# Patient Record
Sex: FEMALE | Race: AMERICAN INDIAN OR ALASKA NATIVE | Employment: FULL TIME | URBAN - METROPOLITAN AREA
[De-identification: names, ages, dates, MRNs, and addresses within clinical notes are randomized per-mention and may not be internally consistent; named-entity substitution may affect disease eponyms.]

---

## 2018-04-17 ENCOUNTER — OFFICE VISIT (OUTPATIENT)
Dept: FAMILY MEDICINE | Facility: CLINIC | Age: 38
End: 2018-04-17
Payer: COMMERCIAL

## 2018-04-17 VITALS
HEART RATE: 73 BPM | RESPIRATION RATE: 16 BRPM | OXYGEN SATURATION: 95 % | TEMPERATURE: 98.3 F | DIASTOLIC BLOOD PRESSURE: 83 MMHG | SYSTOLIC BLOOD PRESSURE: 120 MMHG | WEIGHT: 238.4 LBS | BODY MASS INDEX: 33.25 KG/M2

## 2018-04-17 DIAGNOSIS — E66.811 CLASS 1 OBESITY WITHOUT SERIOUS COMORBIDITY WITH BODY MASS INDEX (BMI) OF 33.0 TO 33.9 IN ADULT, UNSPECIFIED OBESITY TYPE: ICD-10-CM

## 2018-04-17 DIAGNOSIS — N92.0 MENORRHAGIA WITH REGULAR CYCLE: Primary | ICD-10-CM

## 2018-04-17 DIAGNOSIS — Z00.00 ENCOUNTER FOR ROUTINE ADULT HEALTH EXAMINATION WITHOUT ABNORMAL FINDINGS: ICD-10-CM

## 2018-04-17 DIAGNOSIS — Z72.0 TOBACCO ABUSE: ICD-10-CM

## 2018-04-17 LAB
CHOLEST SERPL-MCNC: 158.1 MG/DL (ref 0–200)
CHOLEST/HDLC SERPL: 2.6 {RATIO} (ref 0–5)
HBA1C MFR BLD: 5.5 % (ref 4.1–5.7)
HCT VFR BLD AUTO: 41.5 % (ref 35–47)
HDLC SERPL-MCNC: 61.2 MG/DL
HEMOGLOBIN: 13 G/DL (ref 11.7–15.7)
LDLC SERPL CALC-MCNC: 84 MG/DL (ref 0–129)
MCH RBC QN AUTO: 25.6 PG (ref 26.5–35)
MCHC RBC AUTO-ENTMCNC: 31.3 G/DL (ref 32–36)
MCV RBC AUTO: 81.9 FL (ref 78–100)
PLATELET # BLD AUTO: 492 K/UL (ref 150–450)
RBC # BLD AUTO: 5.07 M/UL (ref 3.8–5.2)
TRIGL SERPL-MCNC: 63.2 MG/DL (ref 0–150)
TSH SERPL DL<=0.005 MIU/L-ACNC: 1.46 MU/L (ref 0.4–4)
VLDL CHOLESTEROL: 12.6 MG/DL (ref 7–32)
WBC # BLD AUTO: 7.2 K/UL (ref 4–11)

## 2018-04-17 NOTE — PATIENT INSTRUCTIONS
Here is the plan from today's visit    1. Dysfunctional uterine bleeding  - Return in 2 weeks to discuss IUD further    2. Menorrhagia with regular cycle  I or someone else from clinic will call you if results are abrnormal, otherwise we will discuss things at your follow-up in 2 weeks  - CBC with Plt ([facility])  - TSH with free T4 reflex    3. Encounter for routine adult health examination without abnormal findings  - Hemoglobin A1c ([facility])  - Lipid [place] ([facility])    Please call or return to clinic if your symptoms don't go away.    Follow up plan  Please make a clinic appointment for follow up with me (Talita Sultana) in 2 weeks for recheck.    Thank you for coming to [facility] today.  Lab Testing:  **If you had lab testing today and your results are reassuring or normal they will be mailed to you or sent through [employer] within 7 days.   **If the lab tests need quick action we will call you with the results.  The phone number we will call with results is # [phone] (home) . If this is not the best number please call our clinic and change the number.  Medication Refills:  If you need any refills please call your pharmacy and they will contact us.   If you need to  your refill at a new pharmacy, please contact the new pharmacy directly. The new pharmacy will help you get your medications transferred faster.   Scheduling:  If you have any concerns about today's visit or wish to schedule another appointment please call our office during normal business hours [phone] (8-5:00 M-F)  If a referral was made to a [facility] and you don't get a call from central scheduling please call [phone].  If a Mammogram was ordered for you at [facility] call [phone] to schedule or change your appointment.  If you had an XRay/CT/Ultrasound/MRI ordered the number is [phone] to schedule or change your radiology appointment.   Medical Concerns:  If you have urgent  medical concerns please call [phone] at any time of the day.      Birth Control: IUD (Intrauterine Device)    The IUD (intrauterine device) is small, flexible, and T-shaped. A trained healthcare provider places it in the uterus. The IUD is one of the most effective birth control methods. It is also reversible. This means it can be removed at any time by a trained healthcare provider. New IUDs are safe and do not have the risks of older types of IUDs.  Pregnancy rates  Talk to your healthcare provider about the effectiveness of this birth control method.  Types of IUDs  IUD insertion is done in the healthcare provider s office. Two types of IUDs are available:    The copper IUD releases a small amount of copper into the uterus. The copper makes it harder for sperm to reach the egg. The device works for at least 10 years.    The progestin IUD releases a hormone called progestin. It causes changes in the uterus to help prevent pregnancy. The device works for 3 to 5 years, depending on which device is chosen. It may be recommended for women who have anemia or heavy and painful periods.  IUDs have thin strings that hang from the opening of the uterus into the vagina. This lets you check that the IUD stays in place.  Things to know about IUDs    IUDs can be used by women who have never been pregnant or by women with a history of sexually transmitted infections (STIs) or tubal pregnancy.    It won't move from the uterus to any other part of the body.    There is a slight risk of the device coming out of the vagina (expulsion).    It may not work in women who have an abnormally shaped uterus.    A copper IUD may cause heavier periods and cramping.    Progestin IUD may cause light periods or no periods at all (irregular bleeding or spotting is possible and normal during first 3 to 6 months).    If you get a sexually transmitted infection with an IUD in place, symptoms may be more severe.  What to report to your  healthcare provider  Be sure your healthcare provider knows if you have:    A sexually transmitted infection (STI) or possible STI    Liver problems    Blood clots (for progestin IUD only)    Breast cancer or a history of breast cancer (progestin IUD only)   Date Last Reviewed: 3/1/2017    8337-5277 [employer]. [address] [place], [place] [address]. All rights reserved. This information is not intended as a substitute for professional medical care. Always follow your healthcare professional's instructions.

## 2018-04-17 NOTE — MR AVS SNAPSHOT
After Visit Summary   4/17/2018    Lesly Herrera    MRN: [id number]           Patient Information     Date Of Birth          1980        Visit Information        Provider Department      4/17/2018 8:20 AM Samara Espinal MD [facility]        Today's Diagnoses     Dysfunctional uterine bleeding    -  1    Menorrhagia with regular cycle        Encounter for routine adult health examination without abnormal findings          Care Instructions    Here is the plan from today's visit    1. Dysfunctional uterine bleeding  - Return in 2 weeks to discuss IUD further    2. Menorrhagia with regular cycle  I or someone else from clinic will call you if results are abrnormal, otherwise we will discuss things at your follow-up in 2 weeks  - CBC with Plt ([facility])  - TSH with free T4 reflex    3. Encounter for routine adult health examination without abnormal findings  - Hemoglobin A1c ([facility])  - Lipid [place] ([facility])    Please call or return to clinic if your symptoms don't go away.    Follow up plan  Please make a clinic appointment for follow up with me (Talita Sultana) in 2 weeks for recheck.    Thank you for coming to [facility] today.  Lab Testing:  **If you had lab testing today and your results are reassuring or normal they will be mailed to you or sent through [facility] within 7 days.   **If the lab tests need quick action we will call you with the results.  The phone number we will call with results is # [phone] (home) . If this is not the best number please call our clinic and change the number.  Medication Refills:  If you need any refills please call your pharmacy and they will contact us.   If you need to  your refill at a new pharmacy, please contact the new pharmacy directly. The new pharmacy will help you get your medications transferred faster.   Scheduling:  If you have any concerns about today's visit or wish to schedule another  appointment please call our office during normal business hours [phone] (8-5:00 M-F)  If a referral was made to a [facility] and you don't get a call from central scheduling please call [phone].  If a Mammogram was ordered for you at [facility] call [phone] to schedule or change your appointment.  If you had an XRay/CT/Ultrasound/MRI ordered the number is [phone] to schedule or change your radiology appointment.   Medical Concerns:  If you have urgent medical concerns please call [phone] at any time of the day.      Birth Control: IUD (Intrauterine Device)    The IUD (intrauterine device) is small, flexible, and T-shaped. A trained healthcare provider places it in the uterus. The IUD is one of the most effective birth control methods. It is also reversible. This means it can be removed at any time by a trained healthcare provider. New IUDs are safe and do not have the risks of older types of IUDs.  Pregnancy rates  Talk to your healthcare provider about the effectiveness of this birth control method.  Types of IUDs  IUD insertion is done in the healthcare provider s office. Two types of IUDs are available:    The copper IUD releases a small amount of copper into the uterus. The copper makes it harder for sperm to reach the egg. The device works for at least 10 years.    The progestin IUD releases a hormone called progestin. It causes changes in the uterus to help prevent pregnancy. The device works for 3 to 5 years, depending on which device is chosen. It may be recommended for women who have anemia or heavy and painful periods.  IUDs have thin strings that hang from the opening of the uterus into the vagina. This lets you check that the IUD stays in place.  Things to know about IUDs    IUDs can be used by women who have never been pregnant or by women with a history of sexually transmitted infections (STIs) or tubal pregnancy.    It won't move from the uterus  to any other part of the body.    There is a slight risk of the device coming out of the vagina (expulsion).    It may not work in women who have an abnormally shaped uterus.    A copper IUD may cause heavier periods and cramping.    Progestin IUD may cause light periods or no periods at all (irregular bleeding or spotting is possible and normal during first 3 to 6 months).    If you get a sexually transmitted infection with an IUD in place, symptoms may be more severe.  What to report to your healthcare provider  Be sure your healthcare provider knows if you have:    A sexually transmitted infection (STI) or possible STI    Liver problems    Blood clots (for progestin IUD only)    Breast cancer or a history of breast cancer (progestin IUD only)   Date Last Reviewed: 3/1/2017    5536-6854 [employer]. [address] [place] [address]. All rights reserved. This information is not intended as a substitute for professional medical care. Always follow your healthcare professional's instructions.                  Follow-ups after your visit        Who to contact     Please call your clinic at [phone] to:    Ask questions about your health    Make or cancel appointments    Discuss your medicines    Learn about your test results    Speak to your doctor            Additional Information About Your Visit        [employer] Information     [employer] is an electronic gateway that provides easy, online access to your medical records. With [employer], you can request a clinic appointment, read your test results, renew a prescription or communicate with your care team.     To sign up for [email or web address] visit the website at [email or web address]   You will be asked to enter the access code listed below, as well as some personal information. Please follow the directions to create your username and password.     Your access code is: [email or web address]  Expires: 2018  9:18 AM     Your access code will  in 90  days. If you need help or a new code, please contact your [facility] or call [phone] for assistance.        Care EveryWhere ID     This is your Care EveryWhere ID. This could be used by other organizations to access your [facility] medical records  [id number]        Your Vitals Were     Pulse Temperature Respirations Last Period Pulse Oximetry BMI (Body Mass Index)    73 98.3  F (36.8  C) (Oral) 16 04/02/2018 (Approximate) 95% 33.25 kg/m2       Blood Pressure from Last 3 Encounters:   04/17/18 120/83   04/01/16 118/84   03/28/16 122/80    Weight from Last 3 Encounters:   04/17/18 238 lb 6.4 oz (108.1 kg)   04/01/16 222 lb (100.7 kg)   03/28/16 218 lb 3.2 oz (99 kg)              We Performed the Following     CBC with Plt ([facility])     Hemoglobin A1c ([facility])     Lipid [place] ([facility])     TSH with free T4 reflex        Primary Care Provider Office Phone # Fax #    Susanna Cole [phone]       [facility] [address] [facility] [address]        Equal Access to Services     ANITA ALLAN : Jovan Yang, wapaulda matheus, qaashleyta kaalmada cliff, dora bentley. So [facility] [phone].    ATENCIÓN: Si habla español, tiene a mina disposición servicios gratuitos de asistencia lingüística. Llame al [phone].    We comply with applicable federal civil rights laws and [place] laws. We do not discriminate on the basis of race, color, national origin, age, disability, sex, sexual orientation, or gender identity.            Thank you!     Thank you for choosing [facility]  for your care. Our goal is always to provide you with excellent care. Hearing back from our patients is one way we can continue to improve our services. Please take a few minutes to complete the written survey that you may receive in the mail after your visit with us. Thank you!             Your Updated  Medication List - Protect others around you: Learn how to safely use, store and throw away your medicines at www.disposemymeds.org.          This list is accurate as of 4/17/18  9:18 AM.  Always use your most recent med list.                   Brand Name Dispense Instructions for use Diagnosis    diclofenac 1 % Gel topical gel    VOLTAREN    100 g    Apply to L rib at painful area 2-4 times a day as needed.    Slipped rib syndrome       LORazepam 0.5 MG tablet    ATIVAN    20 tablet    Take 1 tablet (0.5 mg) by mouth every 8 hours as needed for anxiety    RUBEN (generalised anxiety disorder)       * medroxyPROGESTERone 150 MG/ML injection    DEPO-PROVERA    0.9 mL    Inject 1 mL (150 mg) into the muscle every 3 months    Contraception       * medroxyPROGESTERone 150 MG/ML injection    DEPO-PROVERA    0.9 mL    Inject 1 mL (150 mg) into the muscle every 3 months    Contraception       MULTIVITAMIN ADULT PO           OMEGA-3 FISH OIL PO           venlafaxine 75 MG 24 hr capsule    EFFEXOR-XR    45 capsule    Take 1 tab in the am daily for 4 days, then increase to 2 tabs daily    RUBEN (generalised anxiety disorder)       * Notice:  This list has 2 medication(s) that are the same as other medications prescribed for you. Read the directions carefully, and ask your doctor or other care provider to review them with you.

## 2018-04-17 NOTE — PROGRESS NOTES
"      HPI:       Lesly Herrera is a 37 year old who presents for the following  Patient presents with:  menses: heavy period over the last few yrs. Heavy for 3 days. Chaning pads ect many times a day and having to leave work. Has tried birth control in the past not interested in that route.   Labs Only: pt worried about weight gain and fatigue. Would like thyroid, cholesterol and checkd for DM as it runs in the family.    *Heavy periods  Depo last about 2015, had about 2 shots  Heavy periods: first 3 days clotting and changing pad every hour with tampons - bleeds total of 6 days, last 3 days changes 3 times a day only because of time length using tampon  About 5 years  - BC pills, leg pain, stopped after a couple months  - 2 years without any BC, then depo  Regular timing every month  Condoms, sexually active with man about 1 year ago  STI: chlamydia, treated for that  Same cramping as usual - cramps resolved with ibuprofen      Thyroid  Cholesterol  Weight gain  Diabetes    Daibetes runs in the family  Feeling tired  Gaining weight  \"prediabetic\" in 2011, then went back after losing weight, and was not pre-diabetic then - one year after  FM: mother, brother  Polyphagia, polydipsia       {Superlists (FM):453491:x}  {  :440200}   {+++++++  Additional Concerns  (FM):067052}  Problem, Medication and Allergy Lists were {Reviewed Lists:625098::\"reviewed and are current.\"}  Patient is {New/Established:193854::\"an established patient of this clinic.\"}         Review of Systems:   Review of Systems          Physical Exam:   Patient Vitals for the past 24 hrs:   BP Temp Temp src Pulse Resp SpO2 Weight   04/17/18 0815 120/83 98.3  F (36.8  C) Oral 73 16 95 % 238 lb 6.4 oz (108.1 kg)     Body mass index is 33.25 kg/(m^2).  {[facility] VITALS OPTIONS:329187::\"Vitals were reviewed and were normal\"}     Physical Exam  {  Detailed Ortho and mental status exam:618772}    Results:     {Result Choices:184616}  Assessment " and Plan     {Assessment/Plan Pick List :837685}  There are no discontinued medications.  Options for treatment and follow-up care were reviewed with the patient. Lesly Herrera  engaged in the decision making process and verbalized understanding of the options discussed and agreed with the final plan.      Scribed by Dr. Sultana  Pt was examined and discussed with the resident.  Our plan was formulated with the patient.  Pt will need to make a return appt for Pap and full physical.  Pneumovax recommended due to pt Is a smoker.  Smoking cessation recommended.  Samara Espinal MD    .

## 2018-04-18 ASSESSMENT — PATIENT HEALTH QUESTIONNAIRE - PHQ9: SUM OF ALL RESPONSES TO PHQ QUESTIONS 1-9: 3

## 2018-04-18 NOTE — PROGRESS NOTES
"      HPI:       Lesly Herrera is a 37 year old who presents with multiple concerns. She has not been seeing a physician regularly and is concerned she is developing diabetes, requesting to check her cholesterol and thyroid, and she has has heavy periods.    Patient has been experiencing heavy periods for about 5 years. She was first started on birth control pills, she is unsure which pill, but stopped after a few months when she developed lower leg (calf) pain and was concerned for a blood clot. She went 2 years without any treatment, then tried depo shots. She received about 2 shots total (last depo was in 2015). She has regular timing, menstruating every month regularly. However, she bleeds for 6 days, and the first 3 days of bleeding she passes clots and wears a tampon with pads and has to change both every hour as she bleeds through. She has had to return home from work a few times to change her clothes from bleeding. The last 3 days of menstruation are normal and she only has to change her tampons as directed (every 8 hours) instead of sooner for too much blood. She does experience cramping during her menses, but does not think this is out of the ordinary. The cramping as been the same for many years, and occasionally she takes ibuprofen which stops the pain.  Patient is sexually active with men, but has not be sexually active for about 1 year. She uses condoms. In the past she has had chlamydia, and was treated for that. She has no concerns now for STI.    Diabetes runs in her family (borther and mother). In 2011 she was told she was \"prediabetic\" - lost weight and went back 1 year after for a recheck and was told she was no longer prediabetic. She says she is worried now because she has gained weight. She feels tired, endorses polyphagia and polydipsia.     Problem, Medication and Allergy Lists were reviewed and are current.  Patient is an established patient of this clinic.         Review of " Systems:      Review of Systems   Constitutional: Positive for malaise/fatigue. Negative for chills, fever and weight loss.   HENT: Negative for congestion, sinus pain and sore throat.    Eyes: Positive for blurred vision.   Respiratory: Negative for cough and shortness of breath.    Cardiovascular: Negative for chest pain, claudication and leg swelling.   Gastrointestinal: Negative for abdominal pain, blood in stool, constipation, diarrhea, nausea and vomiting.   Genitourinary: Negative.    Musculoskeletal: Negative.    Skin: Negative for rash.   Neurological: Negative.  Negative for weakness.   Endo/Heme/Allergies: Positive for polydipsia.          Physical Exam:   Patient Vitals for the past 24 hrs:   BP Temp Temp src Pulse Resp SpO2 Weight   04/17/18 0815 120/83 98.3  F (36.8  C) Oral 73 16 95 % 238 lb 6.4 oz (108.1 kg)     Body mass index is 33.25 kg/(m^2).  Vitals were reviewed and were normal     Physical Exam   Constitutional: She is well-developed, well-nourished, and in no distress.   Obese female   HENT:   Right Ear: External ear normal.   Left Ear: External ear normal.   Mouth/Throat: Oropharynx is clear and moist. No oropharyngeal exudate.   Eyes: Conjunctivae are normal. Pupils are equal, round, and reactive to light. Right eye exhibits no discharge. Left eye exhibits no discharge.   Neck: Neck supple. No thyromegaly present.   Cardiovascular: Normal rate, regular rhythm and intact distal pulses.    Pulmonary/Chest: Effort normal and breath sounds normal. No respiratory distress. She has no wheezes. She has no rales.   Abdominal: Soft. Bowel sounds are normal. She exhibits no distension. There is no tenderness. There is no guarding.   Musculoskeletal: She exhibits no edema.   Lymphadenopathy:     She has no cervical adenopathy.   Neurological: She is alert.   Skin: Skin is warm. No rash noted.   Psychiatric: Mood, memory, affect and judgment normal.   Nursing note and vitals reviewed.      Results:    Results for LESLY FORMAN (MRN [id number]) as of 4/18/2018 11:53   Ref. Range 4/17/2018 08:53 4/17/2018 12:57   Hemoglobin A1C Latest Ref Range: 4.1 - 5.7 % 5.5    Cholesterol Latest Ref Range: 0.0 - 200.0 mg/dL 158.1    Cholesterol/HDL Ratio Latest Ref Range: 0.0 - 5.0  2.6    HDL Cholesterol Latest Ref Range: >40.0 mg/dL 61.2    LDL Cholesterol Calculated Latest Ref Range: 0 - 129 mg/dL 84    VLDL Cholesterol Latest Ref Range: 7.0 - 32.0 mg/dL 12.6    Triglycerides Latest Ref Range: 0.0 - 150.0 mg/dL 63.2    TSH Latest Ref Range: 0.40 - 4.00 mU/L  1.46   WBC Latest Ref Range: 4.0 - 11.0 K/uL 7.2    Hemoglobin Latest Ref Range: 11.7 - 15.7 g/dL 13.0    Hematocrit Latest Ref Range: 35.0 - 47.0 % 41.5    Platelets Latest Ref Range: 150.0 - 450.0 K/uL 492.0 (H)    RBC Latest Ref Range: 3.80 - 5.20 M/uL 5.07    MCV Latest Ref Range: 78.0 - 100.0 fL 81.9    MCH Latest Ref Range: 26.5 - 35.0 pg 25.6 (L)    MCHC Latest Ref Range: 32.0 - 36.0 g/dL 31.3 (L)      Assessment and Plan     Lesly is a 36yo F with a documented history of obesity, RUBEN, MDD, tobacco use, and insomnia, who presents today to re-establish care with specific concerns     1. Menorrhagia with regular cycle  Patient is pre-menopausal and does not have any red flag signs or symptoms, she most likely would benefit from continued medical management of her heavy bleeding. As patient has tried BC pills and depo shots, the best next step in treatment would be placement of an IUD, specifically Mirena.  I discussed these options with Lesly.  She seems open to the idea of an IUD, but would like to think about it more.  I explained the procedure and that it can be done here in clinic.  I provided patient with counseling as well as material to read at home for further research and understanding.  She will think about this and come back next week for follow-up regarding her lab results as well as to ask more questions and decide about an IUD.  As  patient bleeds excessively each month a CBC will be checked to assess for any anemia or other abnormality.  We will also check her thyroid.  - CBC with Plt ([facility])  - TSH with free T4 reflex    2. Encounter for routine adult health examination without abnormal findings  Patient has a strong family history of diabetes, and a personal history of prediabetes.  She currently weighs more than when she was diagnosed with prediabetes.  She has appropriate concern for diabetes in herself.  I do believe she warrants checking an A1c.  Given her age she also warrants checking her lipid levels.  - Hemoglobin A1c ([facility])  - Lipid [place] ([facility])    3. Tobacco abuse  Patient does smoke tobacco.  We recommended smoking cessation.  I counseled patient, but patient at this time denies wanting to quit entirely.  She is in the process of cutting down on the amount she smokes.  I explained we have resources available to her.  She declines at this time, but will discuss further at future appointments.    4. Class 1 obesity without serious comorbidity with body mass index (BMI) of 33.0 to 33.9 in adult, unspecified obesity type  Patient is concerned about her weight gain.  At this time her weight gain was focused on ensuring that she does not have any problems with cholesterol or diabetes.  She is concerned that she might have a thyroid dysfunction making her predisposed to obesity.  This seems unlikely given her history.  I do have some concern for diabetes and we will check her hemoglobin A1c.  At the next visit we will talk more about exercise and diet, if no medical abnormality is found.    There are no discontinued medications.  Options for treatment and follow-up care were reviewed with the patient. Lesly Herrera  engaged in the decision making process and verbalized understanding of the options discussed and agreed with the final plan.    Scribed by Dr. Sultana    Pt was examined and discussed with the resident.  Our  plan was formulated with the patient.  Pt will need to make a return appt for Pap and full physical.  Pneumovax recommended due to pt Is a smoker.  Smoking cessation recommended.  Samara Espinal MD

## 2018-04-19 ASSESSMENT — ENCOUNTER SYMPTOMS
NEUROLOGICAL NEGATIVE: 1
SORE THROAT: 0
BLURRED VISION: 1
POLYDIPSIA: 1
SHORTNESS OF BREATH: 0
VOMITING: 0
SINUS PAIN: 0
COUGH: 0
CHILLS: 0
CLAUDICATION: 0
NAUSEA: 0
ABDOMINAL PAIN: 0
CONSTIPATION: 0
FEVER: 0
WEAKNESS: 0
DIARRHEA: 0
BLOOD IN STOOL: 0
MUSCULOSKELETAL NEGATIVE: 1
WEIGHT LOSS: 0

## 2018-04-21 ENCOUNTER — HEALTH MAINTENANCE LETTER (OUTPATIENT)
Age: 38
End: 2018-04-21

## 2018-05-01 ENCOUNTER — OFFICE VISIT (OUTPATIENT)
Dept: FAMILY MEDICINE | Facility: CLINIC | Age: 38
End: 2018-05-01
Payer: COMMERCIAL

## 2018-05-01 VITALS
TEMPERATURE: 98.4 F | SYSTOLIC BLOOD PRESSURE: 122 MMHG | OXYGEN SATURATION: 97 % | WEIGHT: 236.4 LBS | HEART RATE: 88 BPM | BODY MASS INDEX: 32.97 KG/M2 | DIASTOLIC BLOOD PRESSURE: 85 MMHG | RESPIRATION RATE: 20 BRPM

## 2018-05-01 DIAGNOSIS — N92.0 MENORRHAGIA WITH REGULAR CYCLE: Primary | ICD-10-CM

## 2018-05-01 NOTE — MR AVS SNAPSHOT
After Visit Summary   5/1/2018    Lesly Herrera    MRN: [id number]           Patient Information     Date Of Birth          1980        Visit Information        Provider Department      5/1/2018 9:00 AM Mary Jo Robison MD [facility]        Care Instructions      Labs all normal  Ok to take 600mg ibuprofen every 6 hours on heavy bleeding period days    Follow up plan  Schedule pap anytime between now and 2019  Consider nexplanon insertion  Consider different type of birth control pill such as OrthoCyclen Lo    Lab results:  Office Visit on 04/17/2018   Component Date Value Ref Range Status     WBC 04/17/2018 7.2  4.0 - 11.0 K/uL Final     RBC 04/17/2018 5.07  3.80 - 5.20 M/uL Final     Hemoglobin 04/17/2018 13.0  11.7 - 15.7 g/dL Final     Hematocrit 04/17/2018 41.5  35.0 - 47.0 % Final     MCV 04/17/2018 81.9  78.0 - 100.0 fL Final     MCH 04/17/2018 25.6* 26.5 - 35.0 pg Final     MCHC 04/17/2018 31.3* 32.0 - 36.0 g/dL Final     Platelets 04/17/2018 492.0* 150.0 - 450.0 K/uL Final     TSH 04/17/2018 1.46  0.40 - 4.00 mU/L Final     Hemoglobin A1C 04/17/2018 5.5  4.1 - 5.7 % Final     Cholesterol 04/17/2018 158.1  0.0 - 200.0 mg/dL Final     Cholesterol/HDL Ratio 04/17/2018 2.6  0.0 - 5.0 Final     HDL Cholesterol 04/17/2018 61.2  >40.0 mg/dL Final     LDL Cholesterol Calculated 04/17/2018 84  0 - 129 mg/dL Final     Triglycerides 04/17/2018 63.2  0.0 - 150.0 mg/dL Final     VLDL Cholesterol 04/17/2018 12.6  7.0 - 32.0 mg/dL Final     Here is the plan from today's visit    There are no diagnoses linked to this encounter.  NEXPLANON AFTERCARE INSTRUCTIONS     You may have some pain at the site of the Nexplanon insertion. You can help relieve the discomfort with Tylenol (acetaminophen), Aspirin or Advil (ibuprofen). If your discomfort worsens or you notice redness spreading on the skin around the insertion site, please call the clinic.       Irregular bleeding is  common with Nexplanon, especially in the first 6-12 months of use. After one year, approximately 20% of women who use Nexplanon will stop having periods completely. Some women have longer, heavier periods. Some women will have increased spotting between periods. You may find that your periods may be hard to predict.       The Nexplanon does not protect against sexually transmitted infections including the AIDS virus (HIV), warts (HPV), gonorrhea, Chlamydia, and herpes. Condoms should be used to decrease the risk sexually transmitted infections. If you think that you have been exposed to a sexually transmitted infection, please call the clinic.       If you had Nexplanon placed for birth control, it is effective immediately if it was inserted within five days after the start of your period. If you have Nexplanon inserted at any other time during your menstrual cycle, use another method of birth control, like condoms for at least 7 days.       The Nexplanon should be removed and/or replaced by a health care provider after three years.   Warning Signs   Call the clinic if any of the following occurs:     You have bleeding, pus, or increasing redness, or pain at insertion site.     You have fever or chills     The implant comes out or you have concerns about its location.     You have a positive pregnancy test or suspect you might be pregnant.     Thank you for coming to [facility] today.  Lab Testing:  **If you had lab testing today and your results are reassuring or normal they will be mailed to you or sent through [employer] within 7 days.   **If the lab tests need quick action we will call you with the results.  The phone number we will call with results is # [phone] (home) . If this is not the best number please call our clinic and change the number.  Medication Refills:  If you need any refills please call your pharmacy and they will contact us.   If you need to  your refill at a new pharmacy, please  contact the new pharmacy directly. The new pharmacy will help you get your medications transferred faster.   Scheduling:  If you have any concerns about today's visit or wish to schedule another appointment please call our office during normal business hours [phone] (8-5:00 M-F)  If a referral was made to a [facility] and you don't get a call from central scheduling please call [phone].  If a Mammogram was ordered for you at [facility] call [phone] to schedule or change your appointment.  If you had an XRay/CT/Ultrasound/MRI ordered the number is [phone] to schedule or change your radiology appointment.   Medical Concerns:  If you have urgent medical concerns please call [phone] at any time of the day.    Mary Jo Robison MD            Follow-ups after your visit        Who to contact     Please call your clinic at [phone] to:    Ask questions about your health    Make or cancel appointments    Discuss your medicines    Learn about your test results    Speak to your doctor            Additional Information About Your Visit        [employer] Information     [employer] is an electronic gateway that provides easy, online access to your medical records. With [employer], you can request a clinic appointment, read your test results, renew a prescription or communicate with your care team.     To sign up for [employer] visit the website at [email or web address]   You will be asked to enter the access code listed below, as well as some personal information. Please follow the directions to create your username and password.     Your access code is: [email or web address]  Expires: 2018  9:18 AM     Your access code will  in 90 days. If you need help or a new code, please contact your [facility] or call [phone] for assistance.        Care EveryWhere ID     This is your Care EveryWhere ID. This could be used by other organizations to  access your [facility] medical records  [id number]        Your Vitals Were     Pulse Temperature Respirations Last Period Pulse Oximetry BMI (Body Mass Index)    88 98.4  F (36.9  C) (Oral) 20 04/02/2018 (Approximate) 97% 32.97 kg/m2       Blood Pressure from Last 3 Encounters:   05/01/18 122/85   04/17/18 120/83   04/01/16 118/84    Weight from Last 3 Encounters:   05/01/18 236 lb 6.4 oz (107.2 kg)   04/17/18 238 lb 6.4 oz (108.1 kg)   04/01/16 222 lb (100.7 kg)              Today, you had the following     No orders found for display         Today's Medication Changes          These changes are accurate as of 5/1/18  9:36 AM.  If you have any questions, ask your nurse or doctor.               Stop taking these medicines if you haven't already. Please contact your care team if you have questions.     diclofenac 1 % Gel topical gel   Commonly known as:  VOLTAREN   Stopped by:  Mayr Jo Robison MD           LORazepam 0.5 MG tablet   Commonly known as:  ATIVAN   Stopped by:  Mary Jo Robison MD           medroxyPROGESTERone 150 MG/ML injection   Commonly known as:  DEPO-PROVERA   Stopped by:  Mary Jo Robison MD           venlafaxine 75 MG 24 hr capsule   Commonly known as:  EFFEXOR-XR   Stopped by:  Mayr Jo Robison MD                    Primary Care Provider Office Phone # Fax #    Susanna Cole [phone]       [facility] [address] [facility] [address]        Equal Access to Services     [facility] CenterMAUREEN AH: Hadii kathleen esqueda Sovanesa, waaxda luqadaha, qaybta kaalmada cliff, dora bentley. So [facility] [phone].    ATENCIÓN: Si habla español, tiene a mina disposición servicios gratuitos de asistencia lingüística. Llame al [phone].    We comply with applicable federal civil rights laws and [place] laws. We do not discriminate on the basis of race, color, national origin, age, disability, sex, sexual orientation, or gender  identity.            Thank you!     Thank you for choosing [facility]  for your care. Our goal is always to provide you with excellent care. Hearing back from our patients is one way we can continue to improve our services. Please take a few minutes to complete the written survey that you may receive in the mail after your visit with us. Thank you!             Your Updated Medication List - Protect others around you: Learn how to safely use, store and throw away your medicines at www.disposemymeds.org.          This list is accurate as of 5/1/18  9:36 AM.  Always use your most recent med list.                   Brand Name Dispense Instructions for use Diagnosis    MULTIVITAMIN ADULT PO           OMEGA-3 FISH OIL PO

## 2018-05-01 NOTE — PROGRESS NOTES
"      HPI:       Lesly Herrera is a 37 year old who presents for the following  Patient presents with:  RECHECK: Thyroid Follow up  Results: Lab       Heavy menses  Not interested in IUD, doesn't want foreign object in body  Tried OCPs before ([facility]). Felt like legs where \"knotting\". Both legs aching, took for 6 weeks.   On depo had heavier bleeding    Due for pap  History of abnormal pap around 2010, maybe a colposcopy, no further follow up.   NIL and HPV negative 2014    Problem, Medication and Allergy Lists were reviewed and are current.  Patient is an established patient of this clinic.         Review of Systems:   Review of Systems          Physical Exam:   Patient Vitals for the past 24 hrs:   BP Temp Temp src Pulse Resp SpO2 Weight   05/01/18 0915 122/85 - - - - - -   05/01/18 0911 143/73 98.4  F (36.9  C) Oral 88 20 97 % 236 lb 6.4 oz (107.2 kg)     Body mass index is 32.97 kg/(m^2).     Physical Exam   Constitutional: She appears well-developed and well-nourished.   HENT:   Head: Normocephalic and atraumatic.   Eyes: Conjunctivae are normal.   Cardiovascular: Normal rate.    Pulmonary/Chest: Effort normal. No respiratory distress.   Skin: Skin is warm and dry. No rash noted. No erythema. No pallor.   Vitals reviewed.        Results:     Results from last visit:  Office Visit on 04/17/2018   Component Date Value Ref Range Status     WBC 04/17/2018 7.2  4.0 - 11.0 K/uL Final     RBC 04/17/2018 5.07  3.80 - 5.20 M/uL Final     Hemoglobin 04/17/2018 13.0  11.7 - 15.7 g/dL Final     Hematocrit 04/17/2018 41.5  35.0 - 47.0 % Final     MCV 04/17/2018 81.9  78.0 - 100.0 fL Final     MCH 04/17/2018 25.6* 26.5 - 35.0 pg Final     MCHC 04/17/2018 31.3* 32.0 - 36.0 g/dL Final     Platelets 04/17/2018 492.0* 150.0 - 450.0 K/uL Final     TSH 04/17/2018 1.46  0.40 - 4.00 mU/L Final     Hemoglobin A1C 04/17/2018 5.5  4.1 - 5.7 % Final     Cholesterol 04/17/2018 158.1  0.0 - 200.0 mg/dL Final     Cholesterol/HDL " Ratio 04/17/2018 2.6  0.0 - 5.0 Final     HDL Cholesterol 04/17/2018 61.2  >40.0 mg/dL Final     LDL Cholesterol Calculated 04/17/2018 84  0 - 129 mg/dL Final     Triglycerides 04/17/2018 63.2  0.0 - 150.0 mg/dL Final     VLDL Cholesterol 04/17/2018 12.6  7.0 - 32.0 mg/dL Final     Assessment and Plan     Lesly was seen today for recheck and results.    Diagnoses and all orders for this visit:    Menorrhagia with regular cycle  Labs all normal: tyroid, hemoglobin A1c.  Slightly elevated platelets, but concerning.  We discussed various methods treatment for her heavy menses.  She is not interested in the Mirena IUD because of the idea of a foreign body in her uterus.  She is potentially interested in a progesterone implant such as Nexplanon, but wanted more information and think about it.  She is interested in potentially in the future restarting some form of birth control pills, although her smoking status less than 15 cigarettes a day and age over 35 would not recommend an estrogen-containing pill, so we can prescribe her POP.  Ultimately she did not want to commit any treatments today, rather that she would follow-up for clinic visit when she was not menstruating to get her Pap, and get a prescription at that point if needed  - Ok to take 600mg ibuprofen every 6 hours on heavy bleeding period days    Follow up plan  Schedule pap anytime between now and 2019  Consider nexplanon insertion  Consider different type of birth control pill such as progestin-only pill    There are no discontinued medications.  Options for treatment and follow-up care were reviewed with the patient. Lesly Herrera  engaged in the decision making process and verbalized understanding of the options discussed and agreed with the final plan.    I spent 25 min face to face with the patient and >50% was spent counselling the patient about the above medical conditions, educating patient and discussing the recommendations and followup  .    Mary Jo Robison MD  \[facility] [place] [facility]\"" Medicine, [facility] [place]

## 2018-05-01 NOTE — PATIENT INSTRUCTIONS
Labs all normal  Ok to take 600mg ibuprofen every 6 hours on heavy bleeding period days    Follow up plan  Schedule pap anytime between now and 2019  Consider nexplanon insertion  Consider different type of birth control pill such as OrthoCyclen Lo    Lab results:  Office Visit on 04/17/2018   Component Date Value Ref Range Status     WBC 04/17/2018 7.2  4.0 - 11.0 K/uL Final     RBC 04/17/2018 5.07  3.80 - 5.20 M/uL Final     Hemoglobin 04/17/2018 13.0  11.7 - 15.7 g/dL Final     Hematocrit 04/17/2018 41.5  35.0 - 47.0 % Final     MCV 04/17/2018 81.9  78.0 - 100.0 fL Final     MCH 04/17/2018 25.6* 26.5 - 35.0 pg Final     MCHC 04/17/2018 31.3* 32.0 - 36.0 g/dL Final     Platelets 04/17/2018 492.0* 150.0 - 450.0 K/uL Final     TSH 04/17/2018 1.46  0.40 - 4.00 mU/L Final     Hemoglobin A1C 04/17/2018 5.5  4.1 - 5.7 % Final     Cholesterol 04/17/2018 158.1  0.0 - 200.0 mg/dL Final     Cholesterol/HDL Ratio 04/17/2018 2.6  0.0 - 5.0 Final     HDL Cholesterol 04/17/2018 61.2  >40.0 mg/dL Final     LDL Cholesterol Calculated 04/17/2018 84  0 - 129 mg/dL Final     Triglycerides 04/17/2018 63.2  0.0 - 150.0 mg/dL Final     VLDL Cholesterol 04/17/2018 12.6  7.0 - 32.0 mg/dL Final     Here is the plan from today's visit    There are no diagnoses linked to this encounter.  NEXPLANON AFTERCARE INSTRUCTIONS     You may have some pain at the site of the Nexplanon insertion. You can help relieve the discomfort with Tylenol (acetaminophen), Aspirin or Advil (ibuprofen). If your discomfort worsens or you notice redness spreading on the skin around the insertion site, please call the clinic.       Irregular bleeding is common with Nexplanon, especially in the first 6-12 months of use. After one year, approximately 20% of women who use Nexplanon will stop having periods completely. Some women have longer, heavier periods. Some women will have increased spotting between periods. You may find that your periods may be hard to predict.        The Nexplanon does not protect against sexually transmitted infections including the AIDS virus (HIV), warts (HPV), gonorrhea, Chlamydia, and herpes. Condoms should be used to decrease the risk sexually transmitted infections. If you think that you have been exposed to a sexually transmitted infection, please call the clinic.       If you had Nexplanon placed for birth control, it is effective immediately if it was inserted within five days after the start of your period. If you have Nexplanon inserted at any other time during your menstrual cycle, use another method of birth control, like condoms for at least 7 days.       The Nexplanon should be removed and/or replaced by a health care provider after three years.   Warning Signs   Call the clinic if any of the following occurs:     You have bleeding, pus, or increasing redness, or pain at insertion site.     You have fever or chills     The implant comes out or you have concerns about its location.     You have a positive pregnancy test or suspect you might be pregnant.     Thank you for coming to [facility] today.  Lab Testing:  **If you had lab testing today and your results are reassuring or normal they will be mailed to you or sent through [employer] within 7 days.   **If the lab tests need quick action we will call you with the results.  The phone number we will call with results is # [phone] (home) . If this is not the best number please call our clinic and change the number.  Medication Refills:  If you need any refills please call your pharmacy and they will contact us.   If you need to  your refill at a new pharmacy, please contact the new pharmacy directly. The new pharmacy will help you get your medications transferred faster.   Scheduling:  If you have any concerns about today's visit or wish to schedule another appointment please call our office during normal business hours [phone] (8-5:00 M-F)  If a referral was made to a  [facility] and you don't get a call from central scheduling please call [phone].  If a Mammogram was ordered for you at [facility] call [phone] to schedule or change your appointment.  If you had an XRay/CT/Ultrasound/MRI ordered the number is [phone] to schedule or change your radiology appointment.   Medical Concerns:  If you have urgent medical concerns please call [phone] at any time of the day.    Mary Jo Robison MD

## 2019-06-25 ENCOUNTER — OFFICE VISIT (OUTPATIENT)
Dept: FAMILY MEDICINE | Facility: CLINIC | Age: 39
End: 2019-06-25
Payer: MEDICAID

## 2019-06-25 VITALS
HEART RATE: 84 BPM | TEMPERATURE: 98.5 F | BODY MASS INDEX: 31.78 KG/M2 | OXYGEN SATURATION: 97 % | HEIGHT: 71 IN | DIASTOLIC BLOOD PRESSURE: 71 MMHG | SYSTOLIC BLOOD PRESSURE: 116 MMHG | WEIGHT: 227 LBS

## 2019-06-25 DIAGNOSIS — E66.811 OBESITY, CLASS I, BMI 30-34.9: ICD-10-CM

## 2019-06-25 DIAGNOSIS — Z00.00 ENCOUNTER FOR ROUTINE HISTORY AND PHYSICAL EXAM IN FEMALE: Primary | ICD-10-CM

## 2019-06-25 RX ORDER — CALCIUM/MAGNESIUM/ZINC 333-133 MG
TABLET ORAL
COMMUNITY
End: 2023-03-23

## 2019-06-25 ASSESSMENT — MIFFLIN-ST. JEOR: SCORE: 1800.8

## 2019-06-25 NOTE — PROGRESS NOTES
Female Physical Note        HPI   Hx of MDD, RUBEN, insomnia    Concerns today:   Weight loss issues:   Actively trying to lose weight for 3 months  Wants cortisol level tested  Reading up on body types, feels stressed    Hair falling out  For 1 year duration   Nothing seems to make it worse  Taking magnesium, thinks it is helping a small amount    Patient Active Problem List   Diagnosis     Major depression     Tobacco use     Insomnia     Obesity     Anxiety     Generalized anxiety disorder     Past Medical History:   Diagnosis Date     Anemia      Previous Medical Care   [facility]      Family History   Problem Relation Age of Onset     Diabetes Mother      Hypertension Mother      Alcohol/Drug Mother      Psychotic Disorder Sister         schizophrenia          Review of Systems:   Review of Systems:  CONSTITUTIONAL: NEGATIVE for fever, chills  INTEGUMENTARY/SKIN: NEGATIVE for worrisome rashes  EYES: NEGATIVE for vision changes  RESP: NEGATIVE for significant cough or SOB  BREAST: NEGATIVE for masses  CV: NEGATIVE for chest pain  GI: NEGATIVE for nausea, abdominal pain, heartburn, or change in bowel habits  : NEGATIVE for frequency, dysuria, or hematuria  MUSCULOSKELETAL: NEGATIVE for significant arthralgias  NEURO: NEGATIVE for weakness, dizziness  HEME/ALLERGY: NEGATIVE for bleeding problems  PSYCHIATRIC: NEGATIVE for changes in mood   Sleep:   Do you snore most or the night (as reported by a family member)? Yes  Do you feel sleepy or extremely tired during most of the day? Yes  Thinking about sleep study       Social History     Social History     Socioeconomic History     Marital status: Single     Spouse name: Not on file     Number of children: Not on file     Years of education: Not on file     Highest education level: Not on file   Occupational History     Not on file   Social Needs     Financial resource strain: Not on file     Food insecurity:     Worry: Not on file     Inability: Not on file      Transportation needs:     Medical: Not on file     Non-medical: Not on file   Tobacco Use     Smoking status: Current Some Day Smoker     Types: Cigarettes     Smokeless tobacco: Never Used   Substance and Sexual Activity     Alcohol use: Yes     Drug use: No     Sexual activity: Yes     Partners: Male   Lifestyle     Physical activity:     Days per week: Not on file     Minutes per session: Not on file     Stress: Not on file   Relationships     Social connections:     Talks on phone: Not on file     Gets together: Not on file     Attends [employer] service: Not on file     Active member of club or organization: Not on file     Attends meetings of clubs or organizations: Not on file     Relationship status: Not on file     Intimate partner violence:     Fear of current or ex partner: Not on file     Emotionally abused: Not on file     Physically abused: Not on file     Forced sexual activity: Not on file   Other Topics Concern     Parent/sibling w/ CABG, MI or angioplasty before 65F 55M? Not Asked   Social History Narrative     Not on file     Marital Status:Single  Who lives in your household? Two children, age 22 and 19, and roommate     Has anyone hurt you physically, for example by pushing, hitting, slapping or kicking you or forcing you to have sex? Denies  Do you feel threatened or controlled by a partner, ex-partner or anyone in your life? Denies  Sexual Health   Sexual concerns: No   STI History: Pos for chlamydia over 10 years ago, s/p tx  Pregnancy History: No obstetric history on file.  LMP No LMP recorded. LMP 1 week ago.  Low libido for over 3 years.     Last sex was 3 years ago with roommate- male   Not interested in more sex now  Doesn't masturbate- doesn't feel the need to. Wonders if this is strange.     Last Pap Smear Date:   Lab Results   Component Value Date    PAP NIL 11/25/2014     Abnormal Pap History: Details: over 10 years ago, thinks she had a colposcopy in the past   Last pap in 2014- HPV  "negative, NIL   Recommended Screening   Pap/HPV cotest every 5 years for women 30-65   Recommended and patient accepted testing.   HIV screening:  Testing not indicated       Physical Exam:   Vitals: /71   Pulse 84   Temp 98.5  F (36.9  C) (Oral)   Ht 1.803 m (5' 11\")   Wt 103 kg (227 lb)   SpO2 97%   BMI 31.66 kg/m    BMI= Body mass index is 31.66 kg/m .   GENERAL: healthy, alert and no distress.   HENT: ear canals- normal; TMs- normal; Nose- normal; Mouth-no lesions  Head: Unable to appreciate any patches of hair loss around crown of head- no alopecia, no uneven hair follicles   NECK: no tenderness, no adenopathy, no asymmetry, no masses  RESP: lungs clear to auscultation - no rales, no rhonchi, no wheezes  BREAST: no masses, no tenderness, no nipple discharge, no palpable axillary masses or adenopathy  CV: regular rates and rhythm, normal S1 S2, no S3 or S4 and no murmur, no click or rub -  ABDOMEN: soft, no tenderness, no  hepatosplenomegaly, no masses  MS: extremities- no gross deformities noted, no edema  SKIN: no rashes  NEURO: sensory exam- grossly normal, mentation- intact, speech- normal, reflexes- symmetric  - female: cervix- normal, uterus- normal, no masses, no discharge  PSYCH: Alert and oriented times 3; speech- coherent , normal rate and volume; able to articulate logical thoughts, able to abstract reason, no tangential thoughts, no hallucinations or delusions, affect- normal  LYMPHATICS: ant. cervical- normal, post. cervical- normal, axillary- normal, supraclavicular- normal  EYES: extraocular movements - intact, and PERRL  Assessment and Plan   Lesly was seen today for physical.    Diagnoses and all orders for this visit:    Encounter for routine history and physical exam in female  Will mychart results.   -     Pap imaged thin layer screen with HPV - recommended age 30 - 65 years (select HPV order below)    Obesity class 1   Weighs 225 lbs. Has lost 9 pounds since last visit in " 06/2018. Patient attempting to lose weight with diet and exercise for at least 6 months without achieving her desired goal and would like extra support as she is quite frustrated with results. Explained that since she screened positive for needing a sleep study, that if she does have sleep apnea, that this can make it much harder to lose weight as well. Has had recent TSH lipid, CBC and Hgb A1c in April 2018 all largely normal. She does have thrombocytosis of 492 when checked in April 2018.  Referred patient to sleep study today to rule out JUAN PABLO.   Sent in referral for weight management and lifestyle program referral today.     1. Health Care Maintenance: Normal Physical Exam    1. Routine follow up in one year.  2.Contraception: Details: not desired, worsening cramps in legs while on OCPs    Follow up in 2 months to check in on weight loss and above mentioned referrals.      Options for treatment and follow-up care were reviewed with the patient . Lesly Herrera and/or guardian engaged in the decision making process and verbalized understanding of the options discussed and agreed with the final plan.    Braden Acharya MD

## 2019-06-25 NOTE — PROGRESS NOTES
Preceptor Attestation:   Patient seen, evaluated and discussed with the resident. I have verified the content of the note, which accurately reflects my assessment of the patient and the plan of care.   Supervising Physician:  Samara Espinal MD

## 2019-06-28 LAB
COPATH REPORT: NORMAL
PAP: NORMAL

## 2019-07-01 LAB
FINAL DIAGNOSIS: NORMAL
HPV HR 12 DNA CVX QL NAA+PROBE: NEGATIVE
HPV16 DNA SPEC QL NAA+PROBE: NEGATIVE
HPV18 DNA SPEC QL NAA+PROBE: NEGATIVE
SPECIMEN DESCRIPTION: NORMAL
SPECIMEN SOURCE CVX/VAG CYTO: NORMAL

## 2019-09-09 ENCOUNTER — OFFICE VISIT (OUTPATIENT)
Dept: FAMILY MEDICINE | Facility: CLINIC | Age: 39
End: 2019-09-09
Payer: COMMERCIAL

## 2019-09-09 VITALS
HEIGHT: 72 IN | OXYGEN SATURATION: 98 % | BODY MASS INDEX: 31.31 KG/M2 | RESPIRATION RATE: 16 BRPM | HEART RATE: 102 BPM | DIASTOLIC BLOOD PRESSURE: 87 MMHG | WEIGHT: 231.2 LBS | SYSTOLIC BLOOD PRESSURE: 118 MMHG | TEMPERATURE: 99 F

## 2019-09-09 DIAGNOSIS — R10.32 ABDOMINAL PAIN, LEFT LOWER QUADRANT: ICD-10-CM

## 2019-09-09 DIAGNOSIS — F41.9 ANXIETY: Primary | ICD-10-CM

## 2019-09-09 RX ORDER — SERTRALINE HYDROCHLORIDE 25 MG/1
25 TABLET, FILM COATED ORAL DAILY
Qty: 60 TABLET | Refills: 1 | Status: SHIPPED | OUTPATIENT
Start: 2019-09-09 | End: 2020-03-27

## 2019-09-09 RX ORDER — HYDROXYZINE HYDROCHLORIDE 10 MG/1
10-20 TABLET, FILM COATED ORAL EVERY 4 HOURS PRN
Qty: 30 TABLET | Refills: 0 | Status: SHIPPED | OUTPATIENT
Start: 2019-09-09 | End: 2020-03-27

## 2019-09-09 ASSESSMENT — ANXIETY QUESTIONNAIRES
6. BECOMING EASILY ANNOYED OR IRRITABLE: SEVERAL DAYS
5. BEING SO RESTLESS THAT IT IS HARD TO SIT STILL: NOT AT ALL
IF YOU CHECKED OFF ANY PROBLEMS ON THIS QUESTIONNAIRE, HOW DIFFICULT HAVE THESE PROBLEMS MADE IT FOR YOU TO DO YOUR WORK, TAKE CARE OF THINGS AT HOME, OR GET ALONG WITH OTHER PEOPLE: VERY DIFFICULT
2. NOT BEING ABLE TO STOP OR CONTROL WORRYING: MORE THAN HALF THE DAYS
7. FEELING AFRAID AS IF SOMETHING AWFUL MIGHT HAPPEN: MORE THAN HALF THE DAYS
GAD7 TOTAL SCORE: 10
1. FEELING NERVOUS, ANXIOUS, OR ON EDGE: MORE THAN HALF THE DAYS
3. WORRYING TOO MUCH ABOUT DIFFERENT THINGS: MORE THAN HALF THE DAYS

## 2019-09-09 ASSESSMENT — ENCOUNTER SYMPTOMS
NAUSEA: 0
BLOOD IN STOOL: 0
ABDOMINAL PAIN: 1
FATIGUE: 0
DIARRHEA: 0
VOMITING: 0
BACK PAIN: 0
DIAPHORESIS: 0
RHINORRHEA: 1
FREQUENCY: 0
DYSURIA: 0
FEVER: 0

## 2019-09-09 ASSESSMENT — PATIENT HEALTH QUESTIONNAIRE - PHQ9
5. POOR APPETITE OR OVEREATING: SEVERAL DAYS
SUM OF ALL RESPONSES TO PHQ QUESTIONS 1-9: 12

## 2019-09-09 ASSESSMENT — MIFFLIN-ST. JEOR: SCORE: 1844.59

## 2019-09-09 NOTE — PROGRESS NOTES
HPI       Lesly Herrera is a 39 year old  who presents for   Chief Complaint   Patient presents with     Abdominal Pain     x;s 3 days of lower left abdominal pain. Took magnesium citrate yesterday which helped a little per pt. Was tender to touch. No N/V/D per pt. Dull achy pain constant.      Anxiety     her anxiety has came back over the last 2mo. Started a new job and feels stressed. Used to take Lorazepam 0.5mg x's 2 PRN which helped her relax.      Refill Request     Lorazepam     Abdominal pain  3 days abdominal pain.  Now better, but couldn't sleep last night.   LLQ, tender to touch or with slouching. Sharp. Worse with moving. Fine if still.  BM - none yesterday, took mag citrate. Had BM, and then better after.  No blood in stool.  No vaginal discharge.   LMP - labor day was last day. Regular in frequenty, heavy. Gets cramps with this - pelvic, no back pain.   Has a history of chlamydia many years ago - treated. Since then has not been sexually active.     Getting over a cold - nasal congestion, headache  No fever.    Anxiety  New job 1.5 months ago.  Micromanaged by boss. Lots of stress with this. Tasks at higher level now.  2015 got Ativan - on my review seems like referral to behavioural health placed at that time - did not follow-up.   Crying in morning, doesn't want to go to work. Feels anxious while at work.  Interested in MH provider.  Nothing else tried for anxiety, but tried hydroxyzine for sleep - very sleepy with this.  At some point in time on sertraline - stopped because did not want to be on meds. Did so abruptly, felt poorly when this was done.     PHQ-9 SCORE 9/22/2015 4/17/2018 9/9/2019   PHQ-9 Total Score - - -   PHQ-9 Total Score 4 3 12     RUBEN-7 SCORE 11/25/2014 9/22/2015 9/9/2019   Total Score 1 - -   Total Score - 17 10     Problem, Medication and Allergy Lists were reviewed and updated if needed..    Patient is an established patient of this clinic..         Review of  "Systems:   Review of Systems   Constitutional: Negative for diaphoresis, fatigue and fever.   HENT: Positive for congestion and rhinorrhea.    Gastrointestinal: Positive for abdominal pain. Negative for blood in stool, diarrhea, nausea and vomiting.   Genitourinary: Negative for dysuria, frequency, menstrual problem, vaginal bleeding and vaginal discharge.   Musculoskeletal: Negative for back pain.            Physical Exam:     Vitals:    09/09/19 1636   BP: 118/87   Pulse: 102   Resp: 16   Temp: 99  F (37.2  C)   TempSrc: Oral   SpO2: 98%   Weight: 104.9 kg (231 lb 3.2 oz)   Height: 1.843 m (6' 0.56\")     Body mass index is 30.88 kg/m .  Vitals were reviewed and were normal except mild tachycardia      Physical Exam   Constitutional: She is oriented to person, place, and time. She appears well-developed and well-nourished. No distress.   HENT:   Head: Normocephalic and atraumatic.   Eyes: Conjunctivae and EOM are normal.   Neck: Normal range of motion.   Pulmonary/Chest: Effort normal. No respiratory distress.   Abdominal: Soft. She exhibits no distension and no mass. There is tenderness (moderate, RLQ.). There is no rebound and no guarding.   Carnett's negative   Musculoskeletal: Normal range of motion.   Neurological: She is alert and oriented to person, place, and time.   Skin: Skin is warm. She is not diaphoretic.   Psychiatric: She has a normal mood and affect. Her behavior is normal. Judgment and thought content normal.         Results:   No testing ordered today    Assessment and Plan        Lesly was seen today for abdominal pain, anxiety and refill request.    Diagnoses and all orders for this visit:    Abdominal pain, left lower quadrant  3 day hx localized LLQ pain, worsening with movement. No associated  symptoms, not sexually active in recent years. Afebrile, appears well, exam notable for LLQ tenderness without rebound. Currently improving. DDx include constipation (improved after BM); ovarian " cyst/rupture; diverticulitis; less likely PID, ectopic, UTI, nephrolithiasis. Given she is improving and overall clinically appears well, will observe - advised patient to return if fevers, n/v, worsening pain or changing characteristics.     Anxiety  Worsening anxiety given life stressors. Remote use of ativan (2015) which patient found helpful for anxiety at that time. Prescribed prn atarax for use, as well as re-started zoloft. Behavioural health referral placed for psychotherapy.  -     hydrOXYzine (ATARAX) 10 MG tablet; Take 1-2 tablets (10-20 mg) by mouth every 4 hours as needed for anxiety  -     BEHAVIORAL HEALTH REFERRAL ([facility] inter[facility] and external)  -     sertraline (ZOLOFT) 25 MG tablet; Take 1 tablet (25 mg) by mouth daily    RTC in 4 weeks - f/u meds, referrals.       There are no discontinued medications.    Options for treatment and follow-up care were reviewed with the patient. Lesly Herrera  engaged in the decision making process and verbalized understanding of the options discussed and agreed with the final plan.    Cassie Torres MD

## 2019-09-09 NOTE — PATIENT INSTRUCTIONS
Anxiety  Take hydroxyzine as needed (10 mg pills, can take 1-2) for anxiety.  Start to take sertraline 25 mg daily.  Behavioural health will give you a call for scheduling.

## 2019-09-09 NOTE — PROGRESS NOTES
Preceptor Attestation:   Patient seen, evaluated and discussed with the resident. I have verified the content of the note, which accurately reflects my assessment of the patient and the plan of care.   Supervising Physician:  Julia Winn MD MD

## 2019-09-09 NOTE — LETTER
[facility]  [address],  [address]  [facility] [address]  Phone: [phone]  Fax: [phone]    September 9, 2019        Lesly Herrera  [address] [place] [id number]          To whom it may concern:    RE: Lesly Herrera    Patient was seen and treated today at our clinic and missed work.    Please contact me for questions or concerns.      Sincerely,        Cassie Torres MD

## 2019-09-10 ENCOUNTER — TELEPHONE (OUTPATIENT)
Dept: PSYCHOLOGY | Facility: CLINIC | Age: 39
End: 2019-09-10

## 2019-09-10 ASSESSMENT — ANXIETY QUESTIONNAIRES: GAD7 TOTAL SCORE: 10

## 2019-09-10 NOTE — TELEPHONE ENCOUNTER
Mental Health Referral:  Please schedule with Dr. Roland      If you are unable to reach the patient after two phone attempts, please send a letter and close the encounter.      Thank you!

## 2019-09-10 NOTE — LETTER
September 17, 2019    Lesly Herrera  [address] [place] [address]      Dear: Lesly Herrera    You were recently referred for a Behavioral Health appointment by your primary care provider at [facility].  We have called twice to schedule this appointment, but have been unable to reach you.  If you are interested in receiving this service, please call [facility] at [phone].  We would be happy to schedule this appointment for you.      Thank you.      Sincerely,    Patient Representative    [facility]  Hours:  Monday-Friday 8:00 am - 5:00 pm   Phone Number: [phone]

## 2019-09-17 NOTE — TELEPHONE ENCOUNTER
2nd attempt to reach patient and message left on voice mail to call direct line.    Letter mailed 9/17/2019    Liza Morton CMA  [facility]inator

## 2019-09-24 ENCOUNTER — OFFICE VISIT (OUTPATIENT)
Dept: FAMILY MEDICINE | Facility: CLINIC | Age: 39
End: 2019-09-24
Payer: COMMERCIAL

## 2019-09-24 VITALS
HEART RATE: 81 BPM | BODY MASS INDEX: 31.14 KG/M2 | WEIGHT: 233.2 LBS | TEMPERATURE: 98.6 F | RESPIRATION RATE: 16 BRPM | DIASTOLIC BLOOD PRESSURE: 81 MMHG | SYSTOLIC BLOOD PRESSURE: 121 MMHG | OXYGEN SATURATION: 98 %

## 2019-09-24 DIAGNOSIS — R10.32 LEFT LOWER QUADRANT PAIN: Primary | ICD-10-CM

## 2019-09-24 RX ORDER — POLYETHYLENE GLYCOL 3350 17 G/17G
1 POWDER, FOR SOLUTION ORAL DAILY
Qty: 500 G | Refills: 0 | Status: SHIPPED | OUTPATIENT
Start: 2019-09-24 | End: 2022-12-07

## 2019-09-24 NOTE — LETTER
September 24, 2019      To Whom It May Concern:      Lesly Herrera was seen in our on 9/24/2019 for ongoing symptoms. She may return to work/school when her condition improves      Sincerely,        Lidia Ferris MD

## 2019-09-24 NOTE — PROGRESS NOTES
Preceptor Attestation:   Patient seen, evaluated and discussed with the resident. I have verified the content of the note, which accurately reflects my assessment of the patient and the plan of care.   Supervising Physician:  Samara Espinal MD MD

## 2019-09-24 NOTE — PATIENT INSTRUCTIONS
Patient Education     Treating Constipation    Constipation is a common and often uncomfortable problem. Constipation means you have bowel movements fewer than 3 times per week, or strain to pass hard, dry stool. It can last a short time. Or it can be a problem that never seems to go away. The good news is that it can often be treated and controlled.  Eat more fiber  One of the best ways to help treat constipation is to increase your fiber intake. You can do this either through diet or by using fiber supplements. Fiber (in whole grains, fruits, and vegetables) adds bulk and absorbs water to soften the stool. This helps the stool pass through the colon more easily. When you increase your fiber intake, do it slowly to avoid side effects such as bloating. Also increase the amount of water that you drink. Eating more of the following foods can add fiber to your diet.    High-fiber cereals    Whole grains, bran, and brown rice    Vegetables such as carrots, broccoli, and greens    Fresh fruits (especially apples, pears, and dried fruits like raisins and apricots)    Nuts and legumes (especially beans such as lentils, kidney beans, and lima beans)  Get physically active  Exercise helps improve the working of your colon which helps ease constipation. Try to get some physical activity every day. If you haven t been active for a while, talk to your healthcare provider before starting again.  Laxatives  Your healthcare provider may suggest an over-the-counter product to help ease your constipation. He or she may suggest the use of bulk-forming agents or laxatives. The use of laxatives, if used as directed, is common and safe. Follow directions carefully when using them. See your healthcare provider for new-onset constipation, or long-term constipation, to rule out other causes such as medicines or thyroid disease.  Date Last Reviewed: 7/1/2016 2000-2018 [employer]. [address] [place] [address], [place] [address].  All rights reserved. This information is not intended as a substitute for professional medical care. Always follow your healthcare professional's instructions.    Here is the summary from today's visit with Lidia Ferris MD      LIMIT DAIRY (SPECIALLY CHEESE)  LIMIT TEA  KEEP UP WITH WATER  WALK AT LEAST 30 MINUTES A DAY  TAKE THE PSYLLIUM (METAMUCIL) DAILY  TAKE THE MIRALAX (0.5-1 MEASURE) DAILY  INCREASE fresh FRUITS AND VEGGIES  NO WHITE RICE OR FLOUR     Follow up plan  COME BACK IN 2 WEEKS        If you have questions about this letter please contact your provider.    Sincerely,    Your [facility] Team    Thank you for coming to [facility] today.    f you had laboratory testing and results need quick action we will call you at # [phone] (home) . If this is not the best number please call our clinic and change the number.  If you need any refills please call your pharmacy and they will contact us. If you need to  your refill at a new pharmacy, please contact the new pharmacy directly. The new pharmacy will help you get your medications transferred faster.   If you have any concerns about today's visit or wish to schedule another appointment please call our office during normal business hours [phone] (8-5:00 M-F.)  If a referral was made to a [facility] and you don't get a call from central scheduling please call [phone].  If a Mammogram was ordered for you at [facility] call [phone] to schedule or change your appointment.  If you had an XRay/CT/Ultrasound/MRI ordered the number is [phone] to schedule or change your radiology appointment.     If you have urgent medical concerns please call [phone] at any time of the day.

## 2019-09-24 NOTE — PROGRESS NOTES
HPI       Lesly Herrera is a 39 year old female, who presents with:  Chief Complaint   Patient presents with     Abdominal Pain     follow up abdominal pain      LLQ pain, achiness. Missed work for 4 days prior to last visit, again for the past 4 days. Requesting letter for work.  Seen on 9/9, please see note.  It was really bad and felt like there was a mass there, took mag citrate, had BM and it improved.   Last week some discomfort sitting up for long time. Sunday night it worsened again, took laxative and felt better after BM. Also some bloating, no changes with foods, no n/v. No diarrhea.   Today got her menses and is having cramping    Daily BM, not hard but some straining, sometimes feeling of incomplete emptiness, some urgency.  Sometimes sees eggwhite in the stool, but no odor, no steatorrhea or blood. BM helps the pain    No vaginal discharge, not sexually active.     Diet: almonds, water in AM, sandwich at lunch, pizza for diner. No fruits or vegetables, no yogurt, plenty of exercise. No exercises or walking    Problem, Medication and Allergy Lists were reviewed and are current..    Patient is an established patient of this clinic.         Review of Systems     Complete ROS negative except as described here or in HPI.          Physical Exam     Vital signs:  /81   Pulse 81   Temp 98.6  F (37  C) (Oral)   Resp 16   Wt 105.8 kg (233 lb 3.2 oz)   LMP 08/28/2019 (Exact Date)   SpO2 98%   BMI 31.14 kg/m      Vitals were reviewed and were normal    General: pleasant, obese woman. Alert, interactive. Well appearing.    HEENT: No signs of trauma. No rhinorrhea. Moist mucous membranes.   Eyes: Conjunctivae and sclera are normal. Pupils are equal.   Neck: Normal range of motion.  Resp: No respiratory distress. Normal breath sounds throughout without rales/wheezing.   CV: normal RRR, no murmurs. Normal capillary refill.  Abdomen: obese, non-distended. Soft, mild TTP in LLQ, no masses. No  rebound or guarding. Normal bowel sounds. No CVA tenderness   : patient declined exam today  MSK:  No leg edema. No obvious deformities  Neuro: The patient is alert and interactive. Speech normal. No gross focal symptoms.  Skin: Warm and dry. No lesions or sign of trauma noted.   Psych: Affect is anxious, concordant with mood, behavior is normal.      Results     No labs     Assessment and Plan     1. Left lower quadrant pain  Sanjuanita Molina is a 38 year old female with the above history, who presents with LLQ pain w/o infectious,  or GI symptoms. Most likely due to constipation vs anxiety regarding work (has new stressful job, missed ~7 days in the past 3 weeks.    The differential diagnosis included, but was not limited to:   - ectopic pregnancy, however not sexually active, currently on her period  - ovarian cyst, ovarian mass, endometriosis, less likely  - PID, but not sexually active and no vaginal symptoms   - ovarian torsion, unlikely given duration of pain, benign exam and normal vital signs    - no urinary symptoms to suspect UTI or nephrolithiasis.  - low suspicion for diverticulitis or colitis, w/o infectious symptoms   - no alternation constipation diarrhea to suggest IBS, no h/o IBD  At this time the etiology of the pain is unclear, but given hemodynamic stability, lack of peritoneal signs, negative workup, with reasonable clinical certainty I feel that the patient is safe for discharge home with plan for behavioral changes and stool softeners for regular, soft BMs       Discussed reasons to seek immediate care such as fevers, increase in pain, being clammy, vomiting, bloody diarrhea and new or other concerning symptoms. She will use tylenol and ibuprofen for pain. Emphasized importance of seeking care if any changes. Patient verbalized understanding and agreement with plan, all questions answered.    - psyllium (METAMUCIL/KONSYL) 58.6 % powder; Take 15 g by mouth daily  Dispense: 425 g;  Refill: 0  - polyethylene glycol (MIRALAX/GLYCOLAX) powder; Take 17 g (1 capful) by mouth daily  Dispense: 500 g; Refill: 0  - behavioral changes    Provided letter stating that she was evaluated in clinic today for ongoing symptoms, explained that I could not provide written justification for missing work previously, she understood this.    2. BMI 31  Brief counseling for weight loss. Patient open to dietitian referral for both abdominal discomfort and weight loss, but wants to check insurance coverage first.   - f/u at next visit    There are no discontinued medications.    Options for treatment and follow-up care were reviewed with the patient/caregivers. They engaged in the decision making process and verbalized understanding of the options discussed and agreed with the final plan.        Lidia Ferris MD  Family Medicine Resident [facility] PGY-2  Pager: [phone]

## 2019-09-27 PROBLEM — D64.9 ANEMIA: Status: RESOLVED | Noted: 2019-09-27 | Resolved: 2019-09-27

## 2019-09-27 PROBLEM — R10.32 LEFT LOWER QUADRANT PAIN: Status: ACTIVE | Noted: 2019-09-27

## 2019-10-01 ENCOUNTER — HEALTH MAINTENANCE LETTER (OUTPATIENT)
Age: 39
End: 2019-10-01

## 2019-10-14 ENCOUNTER — DOCUMENTATION ONLY (OUTPATIENT)
Dept: FAMILY MEDICINE | Facility: CLINIC | Age: 39
End: 2019-10-14

## 2019-10-14 ENCOUNTER — OFFICE VISIT (OUTPATIENT)
Dept: FAMILY MEDICINE | Facility: CLINIC | Age: 39
End: 2019-10-14
Payer: COMMERCIAL

## 2019-10-14 VITALS
SYSTOLIC BLOOD PRESSURE: 120 MMHG | HEART RATE: 96 BPM | DIASTOLIC BLOOD PRESSURE: 82 MMHG | RESPIRATION RATE: 16 BRPM | WEIGHT: 233 LBS | TEMPERATURE: 98.6 F | OXYGEN SATURATION: 99 % | BODY MASS INDEX: 31.12 KG/M2

## 2019-10-14 DIAGNOSIS — Z56.0 UNEMPLOYMENT: ICD-10-CM

## 2019-10-14 DIAGNOSIS — R10.32 LEFT LOWER QUADRANT PAIN: ICD-10-CM

## 2019-10-14 DIAGNOSIS — Z02.89 ENCOUNTER FOR COMPLETION OF FORM WITH PATIENT: Primary | ICD-10-CM

## 2019-10-14 SDOH — ECONOMIC STABILITY - INCOME SECURITY: UNEMPLOYMENT, UNSPECIFIED: Z56.0

## 2019-10-14 NOTE — PROGRESS NOTES
Form has been completed by provider.     Form sent out via: Fax to [place] [employer] at Fax Number: [phone]  Patient informed: YES  Output date: October 14, 2019    Liza Morton      **Please close the encounter**

## 2019-10-14 NOTE — PROGRESS NOTES
Clinic visit    Subjective       Lesly Herrera is a 39 year old female, who presents with:  Chief Complaint   Patient presents with     RECHECK     follow up abdominal pain        History obtained from patient     Recurrent abdominal pain, seen on 9/9 and 9/24. Abdominal pain better, less frequent and less intense as she is now eating healthier, walking more, and using the stool softeners.   Unfortunately she missed over 7 days at work, so [facility] requested that their specific form was completed by one of the treating physicians, describing the reason she was seen that justified her absence.    She has not started the Atarax or Zoloft as she is not able to afford the copay until her next paycheck.   Still considering dietician referral, forgot to check coverage with insurance though.    Problem, Medication and Allergy Lists were reviewed and are current..    Patient is an established patient of this clinic.    ROS  No suicidal ideation.  Complete ROS negative except as described above.    Objective     Vital signs  /82   Pulse 96   Temp 98.6  F (37  C) (Oral)   Resp 16   Wt 105.7 kg (233 lb)   SpO2 99%   BMI 31.12 kg/m      Vitals were reviewed and were normal    General: Pleasant middle-age woman. Alert, interactive. Well appearing.    HEENT: No signs of trauma. No rhinorrhea. Moist mucous membranes.   Eyes: Conjunctivae and sclera are normal. Pupils are equal.   Neck: Normal range of motion.  CV: normal RRR, no murmurs. Normal capillary refill.  Abdomen: Obese, non-distended. Soft, no TTP . No rebound or guarding.   MSK: no leg edema. No obvious deformities  Neuro: The patient is alert and interactive. Speech normal. No gross focal symptoms.  Skin: Warm and dry. No lesions or sign of trauma noted.   Psych: Affect is appropriate and concordant with mood, behavior is normal.    Results     None     Assessment and Plan     Encounter for completion of form with patient  Unemployment  Patient was  having significant stress related to her new job as well as persistent abdominal pain of unclear etiology. This has improved in the past couple of weeks with healthier diet and activity. I suspect that not going to work has contributed to her improvement as well.   Filled form with dates she was seen in clinic and had been provided a letter to justify short-term absence, as well as the reasons (per patient's request). Explained that I could not state that her medical condition precluded her from working at all on the additional days she missed, instead described her symptoms and work activities that exacerbated them. Of note, she never requested a letter with work limitations nor reached out during the period she did not go to work.   She was in understanding of this, was very appreciative of my help despite the limitations.   Form was due the following day, appreciate Liza's help with faxing it over before the patient even left the clinic    Left lower quadrant pain  Improved with diet and activity, regular BMs with stool softeners. Encouraged her to  the sertraline as soon as able, as her anxiety may be contributing to her symptoms.     BMI 31.0-31.9,adult  Still considering referral to dietician but having financial limitations, forgot to check coverage with insurance. She will look into it.       Return in 4 weeks, sooner as needed    Options for treatment and follow-up care were reviewed with the patient/caregivers. They engaged in the decision making process and verbalized understanding of the options discussed and agreed with the final plan.      Lidia Ferris MD  Family Medicine Resident [facility] PGY-2  Pager: [phone]       There are no discontinued medications.

## 2019-10-15 PROBLEM — Z56.0 UNEMPLOYMENT: Status: ACTIVE | Noted: 2019-10-15

## 2019-10-18 NOTE — PROGRESS NOTES
Preceptor Attestation:   Patient seen, evaluated and discussed with the resident. I have verified the content of the note, which accurately reflects my assessment of the patient and the plan of care.   Supervising Physician:  Bulmaro Neri MD

## 2020-03-27 ENCOUNTER — VIRTUAL VISIT (OUTPATIENT)
Dept: FAMILY MEDICINE | Facility: CLINIC | Age: 40
End: 2020-03-27
Payer: COMMERCIAL

## 2020-03-27 DIAGNOSIS — R10.32 ABDOMINAL PAIN, LEFT LOWER QUADRANT: ICD-10-CM

## 2020-03-27 DIAGNOSIS — F41.9 ANXIETY: Primary | ICD-10-CM

## 2020-03-27 DIAGNOSIS — R11.2 NON-INTRACTABLE VOMITING WITH NAUSEA, UNSPECIFIED VOMITING TYPE: ICD-10-CM

## 2020-03-27 RX ORDER — FAMOTIDINE 20 MG/1
20 TABLET, FILM COATED ORAL 2 TIMES DAILY
Qty: 60 TABLET | Refills: 3 | Status: SHIPPED | OUTPATIENT
Start: 2020-03-27 | End: 2020-09-09

## 2020-03-27 RX ORDER — HYDROXYZINE HYDROCHLORIDE 25 MG/1
25 TABLET, FILM COATED ORAL 3 TIMES DAILY PRN
Qty: 60 TABLET | Refills: 0 | Status: SHIPPED | OUTPATIENT
Start: 2020-03-27 | End: 2020-08-11

## 2020-03-27 RX ORDER — MULTIVIT-MIN/IRON/FOLIC ACID/K 18-600-40
CAPSULE ORAL
COMMUNITY
End: 2023-09-22

## 2020-03-27 NOTE — PROGRESS NOTES
Preceptor Attestation:   Patient discussed with the resident. Assessment and plan reviewed with resident and agreed upon.   Supervising Physician:  Warren Sadler MD  [facility] Medicine

## 2020-03-27 NOTE — PROGRESS NOTES
"Family Medicine Telephone Visit Note           Telephone Visit Consent   Patient was verbally read the following and verbal consent was obtained.  \"I understand that I may revoke this request for a phone visit at any time.  This consent will automatically  3 months from the signed date and time.\"    Name person giving consent:  Patient   Date verbal consent given:  3/27/2020  Time verbal consent given:  2:36 PM    Chief Complaint   Patient presents with     Nausea     Current Outpatient Medications   Medication Sig Dispense Refill     hydrOXYzine (ATARAX) 10 MG tablet Take 1-2 tablets (10-20 mg) by mouth every 4 hours as needed for anxiety (Patient not taking: Reported on 2019) 30 tablet 0     Milk Thistle-Dand-Fennel-Licor (MILK THISTLE XTRA) CAPS capsule        Multiple Vitamins-Minerals (MULTIVITAMIN ADULT PO)        Omega-3 Fatty Acids (OMEGA-3 FISH OIL PO)        polyethylene glycol (MIRALAX/GLYCOLAX) powder Take 17 g (1 capful) by mouth daily 500 g 0     psyllium (METAMUCIL/KONSYL) 58.6 % powder Take 15 g by mouth daily 425 g 0     sertraline (ZOLOFT) 25 MG tablet Take 1 tablet (25 mg) by mouth daily (Patient not taking: Reported on 2019) 60 tablet 1     No Known Allergies           HPI   Patients name: Lesly  Appointment start time:  2:36 PM    Nausea lately. For the last couple days. Vomiting 4 times in last 3 days. Abdominal pain, Lower middle. LMP was 2/24, usually has regular periods. Does not think she is pregnant, but not entirely sure. Denies fevers, chills, back pain, diarrhea, constipation, chest pain, shortness of breath, cough. Nausea is worse generally in the morning. Has had a headache as well, but mild and intermittent. She has been anxious regarding everything that's going on right now with the COVID pandemic, but otherwise has been doing okay.          Assessment and Plan   Lesly was seen today for nausea.    Diagnoses and all orders for this visit:      Abdominal pain, " left lower quadrant  Nausea and vomiting  Anxiety  Pt has had 2-3 days of nausea and vomiting. No other concerning signs/symptoms, including fever or urinary symptoms to suggest UTI/pyelo. Her nausea is worse in the morning and her last period was just over a month ago, so there is a chance that she is pregnant. She is hesitant to do testing at this point, so I advised that if her period does not come in then to give us a call and I will put in an order for UPT. At this point her symptoms are most likely related to anxiety or mild gastritis so I did advise that she try some atarax for anxiety symptoms and this may subsequently help with her nausea. I also sent in pepcid to help with acid suppression and her nausea. If her symptoms do not resolve or worsen I would recommend an in-person visit for an abdominal exam.  -     famotidine (PEPCID) 20 MG tablet; Take 1 tablet (20 mg) by mouth 2 times daily  -     hydrOXYzine (ATARAX) 25 MG tablet; Take 1 tablet (25 mg) by mouth 3 times daily as needed for itching      Refilled medications that would be required in the next 3 months.     After Visit Information:  Patient declined AVS     Appointment end time: 2:51 PM  This is a telephone visit that took 15 minutes.      Clinician location:  [facility]    MERLY BASHIR

## 2020-04-17 ENCOUNTER — MYC MEDICAL ADVICE (OUTPATIENT)
Dept: FAMILY MEDICINE | Facility: CLINIC | Age: 40
End: 2020-04-17

## 2020-04-17 DIAGNOSIS — B37.31 CANDIDIASIS OF VAGINA: Primary | ICD-10-CM

## 2020-04-17 NOTE — TELEPHONE ENCOUNTER
"Called patient to follow up and obtain more information after reciving [employer] Message regarding complaints of \"yeast infection\" and request for medication. Unable to offer same day clinic telephone provider consult, advised to reach out [place] walk in urgent care clinic for provider virtual consult, patient verbalized understanding and agreed requested contact information sent via [employer]. Contact information/[employer] response sent.    Pt notified that message/request for medication routed to PCP to address if appropriate as well. Pt verbalized understanding and agreed.      Message routed to PCP.    Sangeeta Rivas RN    "

## 2020-04-20 RX ORDER — FLUCONAZOLE 150 MG/1
150 TABLET ORAL ONCE
Qty: 1 TABLET | Refills: 0 | Status: SHIPPED | OUTPATIENT
Start: 2020-04-20 | End: 2020-04-20

## 2020-08-11 ENCOUNTER — OFFICE VISIT (OUTPATIENT)
Dept: FAMILY MEDICINE | Facility: CLINIC | Age: 40
End: 2020-08-11
Payer: COMMERCIAL

## 2020-08-11 VITALS
OXYGEN SATURATION: 97 % | BODY MASS INDEX: 31.45 KG/M2 | WEIGHT: 232.2 LBS | HEIGHT: 72 IN | SYSTOLIC BLOOD PRESSURE: 127 MMHG | DIASTOLIC BLOOD PRESSURE: 85 MMHG | HEART RATE: 68 BPM | RESPIRATION RATE: 16 BRPM | TEMPERATURE: 98.2 F

## 2020-08-11 DIAGNOSIS — N89.8 VAGINAL ODOR: ICD-10-CM

## 2020-08-11 DIAGNOSIS — F33.9 EPISODE OF RECURRENT MAJOR DEPRESSIVE DISORDER, UNSPECIFIED DEPRESSION EPISODE SEVERITY (H): ICD-10-CM

## 2020-08-11 DIAGNOSIS — F41.9 ANXIETY: ICD-10-CM

## 2020-08-11 DIAGNOSIS — R10.84 ABDOMINAL PAIN, GENERALIZED: Primary | ICD-10-CM

## 2020-08-11 LAB
BACTERIA: NORMAL
CLUE CELLS: NORMAL
HEMOGLOBIN: 12.2 G/DL (ref 11.7–15.7)
MOTILE TRICHOMONAS: NEGATIVE
ODOR: NORMAL
PH WET PREP: <4.5 (ref 3.8–4.5)
TSH SERPL DL<=0.005 MIU/L-ACNC: 1.07 MU/L (ref 0.4–4)
WBC WET PREP: NORMAL (ref 2–5)
YEAST: PRESENT

## 2020-08-11 RX ORDER — FERROUS SULFATE 325(65) MG
325 TABLET ORAL
COMMUNITY
End: 2023-11-21

## 2020-08-11 RX ORDER — FLUCONAZOLE 150 MG/1
150 TABLET ORAL ONCE
Qty: 1 TABLET | Refills: 0 | Status: SHIPPED | OUTPATIENT
Start: 2020-08-11 | End: 2020-08-11

## 2020-08-11 RX ORDER — HYDROXYZINE HYDROCHLORIDE 25 MG/1
25-50 TABLET, FILM COATED ORAL 3 TIMES DAILY PRN
Qty: 60 TABLET | Refills: 3 | Status: SHIPPED | OUTPATIENT
Start: 2020-08-11 | End: 2022-02-16

## 2020-08-11 ASSESSMENT — MIFFLIN-ST. JEOR: SCORE: 1835.25

## 2020-08-11 NOTE — PROGRESS NOTES
Lesly is a 40 year old  who presents for visit for mental health, abdominal pain, and vaginal odor.   Patient presents with:  RECHECK: [facility] testing  Referral: x 1 year having GI issues, On and off abdominal pains, vomitted 2 times last night, feeling nausea and would like to see specialist, and states that her hair has been falling out   Establish Care: Pt preferred to establish care with a female doctor      Assessment and Plan      Lesly was seen today for recheck, referral and establish care.    Diagnoses and all orders for this visit:    Episode of recurrent major depressive disorder, unspecified depression episode severity (H)  Anxiety  Pt is slightly guarded when talking about mood and its hard to elucidate triggers for her current anxiety. However, the past month has been a period of increased anxiety for patient. Reviewed with Pt the PQH-9 and RUBEN-7 not incredibly high, but she reports significant interference with her daily life due to her mood, mainly due to her anxiety. She reports not having a strong support system. Discussed in-depth, referral to behavioral health and psychotherapy and how it could be beneficial. Pt has been referred before but has been hesitant about initiating. Tried to ally fears of discussing her stressors with mental health professional. Pt willing to try an initial discussion with our behavioral health team. Agreed to warm hand-off but no staff in today. Put in electronic referral to behavioral health. Pt not interested in medication for mood symptoms at this time besides continuing to take her hydroxyzine. A benzodiazapine had been discussed with this pt before and pt asked about this, I do not think its appropriate at this time to start a potentially habit forming medication. Pt also agreed she did not want to start a benzodiazapine. Will refill her hydroxyzine medication. Also due to Pt reporting increased anxiety in the past month, her GI symptoms of loose stool, changes in  hair, will also check a TSH to confirm no thyroid pathology.     -     hydrOXYzine (ATARAX) 25 MG tablet; Take 1-2 tablets (25-50 mg) by mouth 3 times daily as needed for anxiety  -     TSH with free T4 reflex  -     BEHAVIORAL HEALTH REFERRAL ([facility] interal and external)      Abdominal pain, generalized  This is the second visit for abdominal pain which has not resolved. High on the differential is IBS due to chronic nature of pain, improvement with defecation. Due to frequent loose stools 5-6 times a day, will also check stool samples for infectious pathology. No unplanned significant weight loss, night sweat, less concerned for malignancy but blood in stool would like to r/o colorectal cancer. Will order FIT today. Due to the blood in stool and heavy menstrual periods will get hemoglobin. Pt has history of increased anxiety, loose stools, and changes in her hair so will check thyroid today as well to see if contributing. Has heavy menstrual periods and does have cramping during menses, but says its not a similar type of pain to the one she is discussing this visit. She does have some nausea but this is directly associated with pain, no food / alcohol triggers, less likely gallbladder/liver pathology.   -     Clostridium difficile toxin B PCR; Future   -     Ova and Parasite Exam Routine; Future  -     Enteric Bacteria and Virus Panel by [facility] Stool; Future  -     Cryptosporidium/Giardia Immunoassay; Future  -     Fecal colorectal cancer screen FIT; Future  -     Hemoglobin (HGB) ([facility])  -     TSH with free T4 reflex    Vaginal odor  Will check wet prep and if positive for BV or candidiasis will treat as indicated. Pt can communicate via [employer]. As of now, no alarming symptoms that warrant a  exam today or starting medication before results are returned.  Results returned while still in clinic, positive for yeast. Will try Diflucan. Called pt to let her know, no response. Sent [employer] message.  -     Wet  Prep ([facility])        -     fluconazole (DIFLUCAN) 150 MG tablet; Take 1 tablet (150 mg) by mouth once for 1 dose                   Letter given for return to work.     Return in about 20 days (around 8/31/2020) for In-Clinic Visit follow-up on abdominal pain and mood .  If no resolution, could consider selective serotonin reuptake inhibitor for mood and IBS symptoms and referral to GI.     Medications Discontinued During This Encounter   Medication Reason     hydrOXYzine (ATARAX) 25 MG tablet Reorder         DO ANDREW [facility]       Lesly is a 40 year old  who presents for visit for mental health, abdominal pain, and vaginal odor.       Patient presents with:  RECHECK: [facility] testing  Referral: x 1 year having GI issues, On and off abdominal pains, vomitted 2 times last night, feeling nausea and would like to see specialist, and states that her hair has been falling out   Establish Care: Pt preferred to establish care with a female doctor      Vaginal odor:  2 weeks of vaginal odor, fishy smell. Has had BV before, 15 years ago. Has yeast infections off and on 3 months ago.  No abnormal vaginal discharge, itching. Does not want STI testing including gonorrhea/chlymdia, HIV, RPR.      Anxiety:  Hydroxyzine asking for refill. Has been on lorazepam before. Hydroxyzine as needed, feels anxiety has been high. Has needed more than usual. Doesn't feel like she has anyone to talk to. No strong support system. See further information below:       Abdominal pain;  Passed 5-6 stools a day loose. Has been have abdominal pain and nausea. Vomited yesterday. Symptoms all started a year and half ago. Thinks they are about the same. Abdominal pain lower abdomen, lasts 2-3 minutes ago, shooting pain. Magnesium citrate before, felt better the next day after passing stool. Thinks abdominal pain better after bowel movement. No history of colon cancer in family. Faint blood streaked in stool. Never had any hemorrhoids,  "none-currently. On toilet paper, bright red. Noticing blood for the past week, recent change. Vomiting/neausea not associated with food (greasy / alcohol). No other vomiting in past month. Nausea comes and goes, and is associated with abdominal pain.    LMP July 3rd. Has heavy periods. Does have cramping but states that her abdominal pain with GI symptoms is different than menstrual cramping.       Of note, pt has also noted more brittle hair recently, and feels her hair is falling out more.      Health Maintenance Due   Topic Date Due     DEPRESSION ACTION PLAN  1980     HEPATITIS B IMMUNIZATION (3 of 3 - 3-dose primary series) 07/16/2008     PREVENTIVE CARE VISIT  11/25/2015           Mood Symptoms     Concerns: feels anxiety worse than normal     How long have you been feeling this way? Has felt this way since she was \"younger\"       Description:   Depressed Mood?: YES  Anxious Mood?:  YES     Accompanying Signs & Symptoms:  Still participating in activities that you used to enjoy?: Yes  Fatigue:  YES   Irritability:  YES   Difficulty concentrating: YES  Changes in appetite: No   Problems with sleep: No   ++++++++++++++++++++++++++++++++      Substance Use: Yes:         Alcohol Use: Weekends 4 drinks \"a lot\"                  Other drug use:  Never Used    Social Support           Who do you turn to for support? No one    Resources         What makes you feel better?: getting out and walk          What do you do to manage stress? \"hybernating\"         Exercise: None     History  Has there been a time in your life when you needed much less sleep than usual? No   Heart racing/beating fast : No   Thoughts of hurting yourself or others: No   Previous treatment Antidepressants -  selective serotonin reuptake inhibitor zoloft 6-7 years ago, didn't have appetite while on medication, when coming off medication had thoughts of self harm                                  Therapy: No, has been referred to previous times " "but never follow-ed up thinks maybe this time she \"might actual need it\"   Today's PHQ-9 Score:   PHQ-9 SCORE 9/9/2019   PHQ-9 Total Score -   PHQ-9 Total Score 12          RUBEN Score:  RUBEN-7 SCORE 11/25/2014 9/22/2015 9/9/2019   Total Score 1 - -   Total Score - 17 10       Problem, Medication and Allergy Lists were reviewed and updated if needed..  Patient is an established patient of this clinic.  Reviewed and updated as needed this visit by clinical staff  Tobacco  Allergies  Meds  Problems  Med Hx  Surg Hx  Fam Hx  Soc Hx        Reviewed and updated as needed this visit by Provider  Tobacco  Allergies  Meds  Problems  Med Hx  Surg Hx  Fam Hx              Review of Systems:   Review of Systems  10 point review of symptoms was otherwise negative except as noted in HPI         Physical Exam:     Vitals:    08/11/20 1315   BP: 127/85   Pulse: 68   Resp: 16   Temp: 98.2  F (36.8  C)   TempSrc: Oral   SpO2: 97%   Weight: 105.3 kg (232 lb 3.2 oz)   Height: 1.829 m (6')     Body mass index is 31.49 kg/m .  Vitals were reviewed and were normal except for BMI.      Physical Exam     Gen: A&O, NAD  Skin:  No rash or lesion. Hair non-brittle on exam.   Heart: S1S2 RRR no m/r/g/c/l/t   Lungs: CTAB no chest wall deformity and asymmetry no w/r/r  Abd: Soft, ND. Tender LLQ and RLQ. +BS nl x2, No rebound, guarding, mass. No HSM, Negative Pitt's sign, No CVA tenderness. No shifting dullness or fluid wave.  : Bladder ND  Psychiatric: Mood and affect appear normal.   Extremities: Warm, able to move all four extremities at will.          Results:    Results from this visit  Results for orders placed or performed in visit on 08/11/20   Wet Prep ([facility])     Status: None   Result Value Ref Range    Yeast Wet Prep Present none    Motile Trichomonas Wet Prep Negative Negative    Clue Cells Wet Prep None NONE    WBC WET PREP None 2 - 5    Bacteria Wet Prep Moderate None    pH Wet Prep <4.5 3.8 - 4.5    Odor Wet Prep " None NONE   Hemoglobin (HGB) ([facility])     Status: None   Result Value Ref Range    Hemoglobin 12.2 11.7 - 15.7 g/dL       Options for treatment and follow-up care were reviewed with the patient. Lesly Herrera  engaged in the decision making process and verbalized understanding of the options discussed and agreed with the final plan.  [facility]      I precepted with Dr. Mcdaniels.

## 2020-08-11 NOTE — LETTER
[facility]  [address],  [address]  [facility] [address]  [phone]          August 11, 2020    RE:  Lesly Herrera                                                                                                                                                       [address] [facility] [address]            To whom it may concern:    Lesly Herrera is under my professional care for a health visit today on 8/11/2020. She does not have signs of symptoms consistent with COVID-19.     She  may return to work with the following: The employee is ABLE to return to work tomorrow 8/12/2020.       Sincerely,        Mehnaz Trujillo, DO

## 2020-08-11 NOTE — PROGRESS NOTES
Preceptor Attestation:   Patient seen, evaluated and discussed with the resident. I have verified the content of the note, which accurately reflects my assessment of the patient and the plan of care.   Supervising Physician:  Jillian Mcdaniels MD

## 2020-08-11 NOTE — PATIENT INSTRUCTIONS
Here is the plan from today's visit    1. Abdominal pain, generalized  We are working up the different causes of your abdominal pain. We are going to start off with stool studies that look for an infection and for colon cancer. Due to your increased anxiety, loose stools, we are also going to check your thyroid.   - Clostridium difficile toxin B PCR; Future  - Ova and Parasite Exam Routine; Future  - Enteric Bacteria and Virus Panel by [facility] Stool; Future  - Cryptosporidium/Giardia Immunoassay; Future  - Fecal colorectal cancer screen FIT; Future  - Hemoglobin (HGB) ([facility])  - TSH with free T4 reflex    2. Vaginal odor  We will look under the microscope to see if there are signs of a vaginal infection due to a fungus or bacteria. If anything is positive, I will prescribe medication. We discussed using Learn[employer] to communicate.   - Wet Prep ([facility])    3. Episode of recurrent major depressive disorder, unspecified depression episode severity (H)  Anxiety  We discussed talking to our behavioral health providers today. Unfortunately there wasn't anyone in clinic for you to meet in person, but they will call you about scheduling a time to talk. I'm always here if you want to talk more about your mood and come up with a more in-depth plan.   - BEHAVIORAL HEALTH REFERRAL ([facility] and external)  - hydrOXYzine (ATARAX) 25 MG tablet; Take 1-2 tablets (25-50 mg) by mouth 3 times daily as needed for anxiety  Dispense: 60 tablet; Refill: 3    Please call or return to clinic if your symptoms don't go away.    Follow up plan: With me 1-2 weeks after you turn in your stool sample, we can check in on your abdominal pain and discuss next steps     Thank you for coming to [facility] today.  Lab Testing:  **If you had lab testing today and your results are reassuring or normal they will be mailed to you or sent through [employer] within 7 days.   **If the lab tests need quick action we will call you with the results.  The  phone number we will call with results is # [phone] (home) . If this is not the best number please call our clinic and change the number.  Medication Refills:  If you need any refills please call your pharmacy and they will contact us.   If you need to  your refill at a new pharmacy, please contact the new pharmacy directly. The new pharmacy will help you get your medications transferred faster.   Scheduling:  If you have any concerns about today's visit or wish to schedule another appointment please call our office during normal business hours [phone] (8-5:00 M-F)  If a referral was made to a [facility] and you don't get a call from central scheduling please call [phone].  If a Mammogram was ordered for you at [facility] call [phone] to schedule or change your appointment.  If you had an XRay/CT/Ultrasound/MRI ordered the number is [phone] to schedule or change your radiology appointment.   Medical Concerns:  If you have urgent medical concerns please call [phone] at any time of the day.    Mehnaz Trujillo DO

## 2020-08-12 ENCOUNTER — TELEPHONE (OUTPATIENT)
Dept: PSYCHOLOGY | Facility: CLINIC | Age: 40
End: 2020-08-12

## 2020-08-12 NOTE — TELEPHONE ENCOUNTER
Mental Health Referral:  Please schedule with Dr. Roland      Please let patient know this is for primary care behavioral health services.  Therapists at our clinic are able to see patients for 8-12 sessions (video/phone). If further assistance is needed, they will help the patient connect with ongoing services in the community.    Check with the patient if they are able to do a video visit.  They will need access to either a smartphone or a laptop with camera/microphone.  If they can do a video visit, please schedule as a video visit.  If they do not have the technology to do a video visit, please schedule as a telephone visit. For either visit, please put the phone number or email in the appt notes that the provider is supposed to call or send the visit invite.  There are some instructions regarding how the video visits work for patients below. This can be copied/pasted into a [employer] message if the patient would like more information.      If you are unable to reach the patient after two phone attempts, please send a letter and close the encounter.      Thank you!

## 2020-08-12 NOTE — TELEPHONE ENCOUNTER
2:33p.m Attempted to reach patient to schedule MH appointment with . Left message on patient voicemail to call for scheduling.    Ketty Bliss  Care Coordinator

## 2020-08-14 NOTE — TELEPHONE ENCOUNTER
8/14/20 Unable to reach patient by telephone. Sending letter to patient home.    Ketty Bliss  Care Coordinator

## 2020-08-20 ENCOUNTER — HOSPITAL ENCOUNTER (OUTPATIENT)
Facility: CLINIC | Age: 40
Setting detail: SPECIMEN
Discharge: HOME | End: 2020-08-20
Admitting: FAMILY MEDICINE

## 2020-08-20 DIAGNOSIS — R10.84 ABDOMINAL PAIN, GENERALIZED: ICD-10-CM

## 2020-08-21 DIAGNOSIS — R10.84 ABDOMINAL PAIN, GENERALIZED: ICD-10-CM

## 2020-08-21 LAB
C COLI+JEJUNI+LARI FUSA STL QL NAA+PROBE: NOT DETECTED
EC STX1 GENE STL QL NAA+PROBE: NOT DETECTED
EC STX2 GENE STL QL NAA+PROBE: NOT DETECTED
ENTERIC PATHOGEN COMMENT: NORMAL
NOROV GI+II ORF1-ORF2 JNC STL QL NAA+PR: NOT DETECTED
O+P STL MICRO: NORMAL
O+P STL MICRO: NORMAL
RVA NSP5 STL QL NAA+PROBE: NOT DETECTED
SALMONELLA SP RPOD STL QL NAA+PROBE: NOT DETECTED
SHIGELLA SP+EIEC IPAH STL QL NAA+PROBE: NOT DETECTED
SPECIMEN SOURCE: NORMAL
V CHOL+PARA RFBL+TRKH+TNAA STL QL NAA+PR: NOT DETECTED
Y ENTERO RECN STL QL NAA+PROBE: NOT DETECTED

## 2020-08-24 LAB
O+P STL MICRO: NORMAL
O+P STL MICRO: NORMAL
SPECIMEN SOURCE: NORMAL

## 2020-09-08 NOTE — PROGRESS NOTES
Lesly is a 40 year old  who presents for   Patient presents with:  STD: Coming to to get checked, pt requesting a full STD testing  Foot Problems: x 8 months, bruising under her right foot that comes and goes, pt would feel a dull achy pain. x 1 month pt also noticed the same issue on her left foot.       Assessment and Plan      Lesly was seen today for std and foot problems.    Diagnoses and all orders for this visit:    Routine screening for STI (sexually transmitted infection)  Pt had recent exposure to a now former partner who had been with another person. She is worried about exposure to sexually transmitted diseases. Offered different options for STI testing, pt agreed to the following tests. Would recommend she return in 6 weeks for repeat HIV testing as it can initially be missed right after recent exposure. Pt preferred self vaginal swab for gonorrhea and chlamydia testing. Offered wet prep but pt denied wanting it today, states she had it done recently. She was able to take the fluconazole after the most recent wet prep came back positive for yeast.   -     NEISSERIA GONORRHOEA PCR  -     CHLAMYDIA TRACHOMATIS PCR  -     HIV Antigen Antibody Combo  -     Treponema Abs w Reflex to RPR and Titer    Discoloration of skin of foot  Right foot pain  Most likely foot slight blue/dark discoloration is secondary to veins on the bottom of her feet. No clear large change in skin color. Reassured patient. Informed Pt she can take a picture of it, when the discoloration is more severe, and send it to me via [employer] and I can review it. The pain she is experiencing at the bottom of her right foot is most likely secondary to plantar fasciitis, but also could be due to improper shoes. Pt has high arches on both feet and it's possible that improper fitting shoes are causing foot pain. Gave pt hand out from [employer] and an AVS instruction print out about plantar fasciitis. Showed pt on the computer different plantar  fasciitis stretches and demonstrated them for the patient in the room. Suggested foam / tennis ball or cold water bottle rolling of the plantar fascia at the bottom of her foot. Also recommended wearing comfortable shoes and trying to find orthotics that are comfortable. Showed pt pictures of foot orthotics. Recommended going to running [employer] as they may have a greater variety of foot orthotics. Advised trying some of these methods now, as its easier to treat plantar fasciitis if caught early vs. waiting until pain is severe.     Abdominal pain, generalized  Discussed pt's chronic abdominal pain briefly today. She reports that the pain is less than her previous visit. Reviewed the stool studies completed last visit and FIT test which were negative. Discussed possibility that this is IBS, but did not go in depth about this possible diagnosis. Offered to refer her to GI if she continues to have symptoms of diarrhea alternating with constipation and abdominal pain. This is her third visit with us where abdominal pain has been discussed without full resolution of symptoms so referral to specialist would be warranted if in the pt's wishes. Could also offer next visit to go over diet specific recommendations for IBS and explain this condition more in-depth.     Episode of recurrent major depressive disorder, unspecified depression episode severity (H)  Anxiety  Pt has a history of depression and generalized anxiety disorder. Pt 2nd time did not follow-up with our behavioral health after being referred after last visit. PQH-9 and RUBEN-7 consistent with previous visit, slightly lower on both. Would bring up pt's mood and support system next visit. Previously has endorsed poor support system. Would bring up possibility of an selective serotonin reuptake inhibitor. Pt has used hydroxyzine in the past PRN for anxiety. She was hesitant about selective serotonin reuptake inhibitor in previous visit, but would like a chance to  "discuss more in-depth. I have expressed in the past how selective serotonin reuptake inhibitor can help contribute to relieving anxiety overall and not just treating PRN symptoms for better overall control. If selecting an selective serotonin reuptake inhibitor would go with Lexapro, good for controlled anxiety / racing thoughts, no diarrhea and nausea unlike zoloft (which pt already experiences), better than fluoxetine for anxiety.        Return in about 6 weeks (around 10/21/2020) for In-Clinic Visit. Repeat HIV testing. Would like to check in on mood again, would like to suggest an selective serotonin reuptake inhibitor if appropriate.    Medications Discontinued During This Encounter   Medication Reason     famotidine (PEPCID) 20 MG tablet Stopped by Patient        Mehnaz Trujillo, DO         HPI       Lesly is a 40 year old  who presents for   Patient presents with:  STD: Coming to to get checked, pt requesting a full STD testing  Foot Problems: x 8 months, bruising under her right foot that comes and goes, pt would feel a dull achy pain. x 1 month pt also noticed the same issue on her left foot.       Health Maintenance Due   Topic Date Due     DEPRESSION ACTION PLAN  1980     HEPATITIS B IMMUNIZATION (3 of 3 - 3-dose primary series) 07/16/2008     PREVENTIVE CARE VISIT  11/25/2015     INFLUENZA VACCINE (1) 09/01/2020     STI:  States her previous male partner \"stepped out\" on her a few weeks ago. She is unsure if she was exposed to any sexually transmitted diseases. She is no longer with that partner. She would like HIV/Syphilis as well as gonorrhea and chlamydia testing today. She would prefer to self-swab. She is also wondering about the timing of the exposure and wondering if she needs repeat HIV testing at a later date. She has noticed a slight vaginal odor in the past few weeks. No abnormal discharge. No fever/chills, no suprapubic pain, no vaginal spotting, no pain with urination.     Foot Pain " / Blue spot on bottom of foot arch:  Noticed dark spot on bottom of her right foot 3 weeks ago. It fades and comes back. The first time she noticed it was 8 months ago. Pt points to bottom of arch of right foot and states she thinks it is discolored / darker. States she has some mild pain on the right side arch but not really on the left.   Black dress shoes aggrivate it, super cushion makes it worse  No other shoe aggrivates it.   No new shoes and socks.   Nothing makes the spot go away, or makes her foot pain better.   No injury. No sports.   No foot pain getting out of bed in the morning  Pain is at random times not associated to other events / time of day     Abdominal Pain:  States it is getting better. Doesn't think she needs referral to GI doctor today. Alternating constipation and diarrhea but not as bad as before. Takes miralax as needed if constipated. Reviewed again results of her stool studies, FIT / Ova/parasites which were negative.     Anxiety/Depression:  Did not discuss in depth today. Pt with recent life stressor of partner being unfaithful. Reviewed Pt's PQH-9 and RUBEN-7.     Problem, Medication and Allergy Lists were reviewed and updated if needed.  Patient is an established patient of this clinic.         Review of Systems:   Review of Systems  8 point review of symptoms was otherwise negative except as noted in HPI         Physical Exam:     Vitals:    09/09/20 0820   BP: 126/83   Pulse: 79   Resp: 16   Temp: 98.3  F (36.8  C)   TempSrc: Oral   SpO2: 97%   Weight: 103.4 kg (228 lb)   Height: 1.829 m (6')     Body mass index is 30.92 kg/m .  Vital signs normal except weight / BMI.      Physical Exam     General: Alert and oriented, in no acute distress.  Skin: Warm and dry, no abnormalities noted.  Eyes: No sclera icterus.   ENT: Speech intact, nasal passages open, no hearing impairment noted.  CV: S1 S1 RRR. No m/r/g/c/l/t. No cyanosis or pallor, warm and well perfused.  Respiratory: CTAB. No  w/r/r. No respiratory distress, no accessory muscle use.  Neuro: Gait and station normal, comprehension intact. Gross and fine motor skills intact.   Psychiatric: Mood and affect appear normal.   Extremities: Warm, able to move all four extremities at will. Full ROM of bilateral ankles. Strength 5/5 dorsiflexion and plantar flexion BL. Sensation equal bilaterally. High arches bilaterally. Point tenderness at top of right foot arch with palpation. Mild point tenderness extending towards calcaneus of right foot. No point tenderness of left plantar foot. Slight blue / darker color to bottom skin for R and L foot, with noticeable blue veins.         Results:    Results from this visitNo results found for any visits on 09/09/20.    Options for treatment and follow-up care were reviewed with the patient. Lesly Herrera  engaged in the decision making process and verbalized understanding of the options discussed and agreed with the final plan.  DO [facility]

## 2020-09-09 ENCOUNTER — OFFICE VISIT (OUTPATIENT)
Dept: FAMILY MEDICINE | Facility: CLINIC | Age: 40
End: 2020-09-09
Payer: COMMERCIAL

## 2020-09-09 VITALS
DIASTOLIC BLOOD PRESSURE: 83 MMHG | RESPIRATION RATE: 16 BRPM | WEIGHT: 228 LBS | SYSTOLIC BLOOD PRESSURE: 126 MMHG | TEMPERATURE: 98.3 F | HEIGHT: 72 IN | OXYGEN SATURATION: 97 % | HEART RATE: 79 BPM | BODY MASS INDEX: 30.88 KG/M2

## 2020-09-09 DIAGNOSIS — R10.84 ABDOMINAL PAIN, GENERALIZED: ICD-10-CM

## 2020-09-09 DIAGNOSIS — Z11.3 ROUTINE SCREENING FOR STI (SEXUALLY TRANSMITTED INFECTION): ICD-10-CM

## 2020-09-09 DIAGNOSIS — F33.9 EPISODE OF RECURRENT MAJOR DEPRESSIVE DISORDER, UNSPECIFIED DEPRESSION EPISODE SEVERITY (H): Chronic | ICD-10-CM

## 2020-09-09 DIAGNOSIS — F41.9 ANXIETY: Chronic | ICD-10-CM

## 2020-09-09 DIAGNOSIS — L81.9 DISCOLORATION OF SKIN OF FOOT: Primary | ICD-10-CM

## 2020-09-09 DIAGNOSIS — M79.671 RIGHT FOOT PAIN: ICD-10-CM

## 2020-09-09 ASSESSMENT — MIFFLIN-ST. JEOR: SCORE: 1816.2

## 2020-09-09 ASSESSMENT — ANXIETY QUESTIONNAIRES
2. NOT BEING ABLE TO STOP OR CONTROL WORRYING: SEVERAL DAYS
6. BECOMING EASILY ANNOYED OR IRRITABLE: NOT AT ALL
GAD7 TOTAL SCORE: 3
7. FEELING AFRAID AS IF SOMETHING AWFUL MIGHT HAPPEN: SEVERAL DAYS
1. FEELING NERVOUS, ANXIOUS, OR ON EDGE: SEVERAL DAYS
3. WORRYING TOO MUCH ABOUT DIFFERENT THINGS: NOT AT ALL
5. BEING SO RESTLESS THAT IT IS HARD TO SIT STILL: NOT AT ALL
IF YOU CHECKED OFF ANY PROBLEMS ON THIS QUESTIONNAIRE, HOW DIFFICULT HAVE THESE PROBLEMS MADE IT FOR YOU TO DO YOUR WORK, TAKE CARE OF THINGS AT HOME, OR GET ALONG WITH OTHER PEOPLE: NOT DIFFICULT AT ALL

## 2020-09-09 ASSESSMENT — PATIENT HEALTH QUESTIONNAIRE - PHQ9
SUM OF ALL RESPONSES TO PHQ QUESTIONS 1-9: 1
5. POOR APPETITE OR OVEREATING: NOT AT ALL

## 2020-09-09 NOTE — PATIENT INSTRUCTIONS
Here is the plan from today's visit    1. Routine screening for STI (sexually transmitted infection)    Please come back in 6 weeks and we can re-screen for HIV.   - NEISSERIA GONORRHOEA PCR  - CHLAMYDIA TRACHOMATIS PCR  - HIV Antigen Antibody Combo  - Treponema Abs w Reflex to RPR and Titer    2. Discoloration of skin of foot  3. Right foot pain    Most likely this is due to plantar fascitis. I printed some additional information for you.   - Roll out the bottom of your foot with tennis ball or cold waterbottle  -See stretches  -Orthotics     Please call or return to clinic if your symptoms don't go away.    Follow up plan  Please make a clinic appointment for follow up with me (LORENA ARMSTRONG) in 6 weeks for rescreening.     Thank you for coming to [facility] today.  Lab Testing:  **If you had lab testing today and your results are reassuring or normal they will be mailed to you or sent through [employer] within 7 days.   **If the lab tests need quick action we will call you with the results.  The phone number we will call with results is # [phone] (home) . If this is not the best number please call our clinic and change the number.  Medication Refills:  If you need any refills please call your pharmacy and they will contact us.   If you need to  your refill at a new pharmacy, please contact the new pharmacy directly. The new pharmacy will help you get your medications transferred faster.   Scheduling:  If you have any concerns about today's visit or wish to schedule another appointment please call our office during normal business hours [phone] (8-5:00 M-F)  If a referral was made to a [facility] and you don't get a call from central scheduling please call [phone].  If a Mammogram was ordered for you at [facility] call [phone] to schedule or change your appointment.  If you had an XRay/CT/Ultrasound/MRI ordered the number is [phone] to schedule  or change your radiology appointment.   Medical Concerns:  If you have urgent medical concerns please call [phone] at any time of the day.    Mehnaz Trujillo, DO          Patient Education     Plantar Fasciitis  Plantar fasciitis is a painful swelling of the plantar fascia. The plantar fascia is a thick, fibrous layer of tissue that covers the bones on the bottom of your foot. It supports the foot bones in an arched position.  Plantar fasciitis can happen gradually or suddenly. It usually affects one foot at a time. Heel pain can be sharp, like a knife sticking into the bottom of your foot. You may feel pain after exercising, long-distance jogging, stair climbing, long periods of standing, or after standing up.  Risk factors include: non-active lifestyle, arthritis, diabetes, obesity or recent weight gain, flat foot, high arch. Wearing high heels, loose shoes, or shoes with poor arch support for long periods of time adds to the risk. This problem is commonly found in runners and dancers. It also found in people who stand on hard surfaces for long periods of time.  Foot pain from this condition is usually worse in the morning. But it often improves with walking. By the end of the day there may be a dull aching. Treatment requires short-term rest and controlling swelling. It may take up to 9 months before all symptoms go away. Rarely, a steroid injection into the foot, or surgery, may be needed.  Home care    If you are overweight, lose weight to help healing.    Choose supportive shoes with good arch support and shock absorbency. Replace athletic shoes when they become worn out. Don t walk or run barefoot.    Premade or custom-fitted shoe inserts may be helpful. Inserts made of silicone seem to be the most effective. Custom-made inserts can be provided by foot specialist, physical therapist, or orthopedist.    Premade or custom-made night splints keep the heel stretched out while you sleep. They may prevent  morning pain.    Limit activities that stress the feet: jogging, prolonged standing or walking, contact sports, etc.    First thing in the morning and before sports, stretch the bottom of your feet. Gently flex your ankle so the toes move toward your knee.    Icing may help control heel pain. Apply an ice pack to the heel for 10 to 20 minutes as a preventive. Or ice your heel after a severe flare-up of symptoms. You may repeat this every 1 to 2 hours as needed.    You may use over-the-counter pain medicine to control pain, unless another medicine was prescribed. Anti-inflammatory pain medicines, such as ibuprofen or naproxen, may work better than acetaminophen. If you have chronic liver or kidney disease or ever had a stomach ulcer or gastrointestinal bleeding, talk with your healthcare provider before using these medicines.  Follow-up care  Follow up with your healthcare provider, or as advised.  Call for an appointment if pain worsens or there is no relief after a few weeks of home treatment. Shoe inserts, a night splint, or a special boot may be required.  If X-rays were taken, you will be told of any new findings that may affect your care.  When to seek medical advice  Call your healthcare provider right away if any of these occur:    Foot swelling    Redness or warmth with increasing pain  Date Last Reviewed: 5/1/2018 2000-2019 [employer]. [address] [place] [address], [place] [address]. All rights reserved. This information is not intended as a substitute for professional medical care. Always follow your healthcare professional's instructions.

## 2020-09-09 NOTE — PROGRESS NOTES
Preceptor Attestation:   Patient seen and discussed with the resident. Assessment and plan reviewed with resident and agreed upon.   Supervising Physician:  DO [facility]

## 2020-09-10 LAB
HIV 1+2 AB+HIV1 P24 AG SERPL QL IA: NONREACTIVE
T PALLIDUM AB SER QL: NONREACTIVE

## 2020-09-10 ASSESSMENT — ANXIETY QUESTIONNAIRES: GAD7 TOTAL SCORE: 3

## 2020-09-11 LAB
C TRACH DNA SPEC QL NAA+PROBE: NEGATIVE
N GONORRHOEA DNA SPEC QL NAA+PROBE: NEGATIVE
SPECIMEN SOURCE: NORMAL
SPECIMEN SOURCE: NORMAL

## 2021-01-15 ENCOUNTER — HEALTH MAINTENANCE LETTER (OUTPATIENT)
Age: 41
End: 2021-01-15

## 2021-01-28 NOTE — PROGRESS NOTES
Lesly is a 40 year old who is being evaluated via a billable telephone visit.      What phone number would you like to be contacted at? Cell phone listed  How would you like to obtain your AVS? Garry     Assessment & Plan     Plantar fasciitis  Has previously seen me on 9/9/2020 for similar diagnosis. Since then pain has gotten worse, especially this past week. The pain she is experiencing at the bottom of her right foot is most likely secondary to plantar fasciitis. Pt would benefit from structured PT. If no improvement after full course of PT would consider referral to sports medicine clinic. Difficult to evaluate her foot pain over the phone today, but seemed consistent to prior visit.   - Garry AVS with plantar fasciitis stretches  - Recommended rolling out with icy water bottle or tennis ball  - Recommended supportive shoes like sneakers (heel / cup support)   - naproxen (NAPROSYN) 500 MG tablet  Dispense: 14 tablet; Refill: 0  - PHYSICAL THERAPY REFERRAL - INTERNAL    Review of external notes as documented above     Return in about 2 months (around 4/2/2021) for with me, in person.   Garry message me after trying a full course of physical therapy 6-8 weeks. If no improvement we can refer you to Sports Medicine Clinic where they can do a full evaluation of your right foot and ankle.     Mehnaz Trujillo DO  [facility]    Meliton Grace is a 40 year old who presents to for virtual visit today for the following health issues.     HPI       Musculoskeletal problem/pain   Leg / Foot Pain  Onset/Duration: Right foot (sort of both feet but right foot is noticeably worse).   It is achey lasts all day. Not necessarily worse early in the AM (first few steps).   Had to call off work due to right foot pain.   Works in medical records (sits a lot).   Has been going on for months, but worse in the past week.   Still noticed blue area under foot (similar to when I saw her in the office  and we discussed likely was her underlying veins). Hasn't necessarily worsened in color but still there, concerned.   Description  Location: foot - right bottom, under heel extending a little to toes  Joint Swelling: no  Redness: no  Pain: 5/10   Warmth: no  Intensity:  moderate  Progression of Symptoms:  Worsening (in the past week)  Accompanying signs and symptoms:   Fevers: no  Numbness/tingling/weakness: no  History  Trauma to the area: no  Previous similar problem: YES (see previous visit w/ me)  Previous evaluation:  YES  Precipitating or alleviating factors:  Aggravating factors include: Comes and goes no real trigger. Not worse first thing in the morning. Stairs not provoked. No exercise right now (cold out).     Therapies tried and outcome: No NSAID use, never been to physical therapy. Has tried tennis ball helped a little (to roll out foot). Not doing exercises for plantar fascitis currently.     Review of Systems   Constitutional, HEENT, cardiovascular, pulmonary, gi and gu systems are negative, except as otherwise noted.      Objective       Vitals:  No vitals were obtained today due to virtual visit.    Physical Exam   PSYCH: Alert and oriented times 3; coherent speech, normal   rate and volume, able to articulate logical thoughts, able   to abstract reason, no tangential thoughts, no hallucinations   or delusions  Her affect is normal  RESP: No cough, no audible wheezing, able to talk in full sentences  Remainder of exam unable to be completed due to telephone visits      Phone call duration: 13 minutes

## 2021-01-29 ENCOUNTER — VIRTUAL VISIT (OUTPATIENT)
Dept: FAMILY MEDICINE | Facility: CLINIC | Age: 41
End: 2021-01-29
Payer: COMMERCIAL

## 2021-01-29 DIAGNOSIS — J06.9 VIRAL URI: Primary | ICD-10-CM

## 2021-01-29 PROCEDURE — 99213 OFFICE O/P EST LOW 20 MIN: CPT | Mod: TEL | Performed by: STUDENT IN AN ORGANIZED HEALTH CARE EDUCATION/TRAINING PROGRAM

## 2021-01-29 NOTE — PROGRESS NOTES
Family Medicine Telephone Visit Note    Chief Complaint   Patient presents with     RECHECK     experiencing covid symptoms     Forms     needs doctor note for unemployment due to covid symptoms            HPI   Patients name: Lesly  Appointment start time:  11:20 AM    Last week she started having aches in her legs and feet, lasted for a couple days. Then a couple days later developed a headache that hasn't gone away. Also had chills two nights ago and quite fatigued. Felt hot yesterday, but temp was 99.3. Denies cough, shortness of breath, rhinorrhea, congestion. No loss of taste or smell. No known sick contacts or COVID exposures. COVID testing done on Tuesday, but not back yet. This was at [facility]. Has been at home, isolating as much as possible. No-one else at home has gotten symptoms. Trying to drink at least 6-8 cups of water daily, but still feeling dehydrated.     Current Outpatient Medications   Medication Sig Dispense Refill     Ascorbic Acid (VITAMIN C) 500 MG CAPS        ferrous sulfate (FEROSUL) 325 (65 Fe) MG tablet Take 325 mg by mouth daily (with breakfast)       hydrOXYzine (ATARAX) 25 MG tablet Take 1-2 tablets (25-50 mg) by mouth 3 times daily as needed for anxiety 60 tablet 3     Multiple Vitamins-Minerals (MULTIVITAMIN ADULT PO)        Omega-3 Fatty Acids (OMEGA-3 FISH OIL PO)        polyethylene glycol (MIRALAX/GLYCOLAX) powder Take 17 g (1 capful) by mouth daily 500 g 0     psyllium (METAMUCIL/KONSYL) 58.6 % powder Take 15 g by mouth daily 425 g 0     Milk Thistle-Dand-Fennel-Licor (MILK THISTLE XTRA) CAPS capsule        No Known Allergies           Review of Systems:              Physical Exam:     LMP 01/18/2021 (Approximate)   Estimated body mass index is 30.92 kg/m  as calculated from the following:    Height as of 9/9/20: 1.829 m (6').    Weight as of 9/9/20: 103.4 kg (228 lb).    Exam:  Constitutional: healthy, alert and no distress  Psychiatric: mentation appears normal and affect  normal/bright          Assessment and Plan   Lesly was seen today for recheck and forms.    Diagnoses and all orders for this visit:    Viral URI  Symptoms consistent with viral URI. No known covid exposure, and covid test pending. Recommended conservative, supportive treatment and follow up if worsening symptoms. Advised quarantining per current CDC guidelines and letter written for work to explain these criteria.      Refilled medications that would be required in the next 3 months.     After Visit Information:  Patient chose to view AVS via [employer]    No follow-ups on file.    Appointment end time: 11:29 AM  This is a telephone visit that took 9 minutes.      Clinician location:  [facility]     Remi Chapman MD  I precepted today with Dr. Harris.

## 2021-01-29 NOTE — LETTER
January 29, 2021      To Whom It May Concern:      Lesly Herrera was seen in our clinic today, 01/29/21.  She is experiencing flu-like symptoms consistent with COVID-19 infection that started one week. She should quarantine for at least 10 days from symptom onset, and should only return to work after this quarantine ends as long as she does not have any new or worsening shortness of breath or fever of over 100.3 degrees.    Sincerely,      Remi Chapman MD

## 2021-02-01 NOTE — PROGRESS NOTES
Preceptor Attestation:   I discussed the patient with the resident and talked to the patient on the phone. I have verified the content of the note, which accurately reflects my assessment of the patient and the plan of care.   Supervising Physician:  Iraida Harris DO.

## 2021-02-02 ENCOUNTER — VIRTUAL VISIT (OUTPATIENT)
Dept: FAMILY MEDICINE | Facility: CLINIC | Age: 41
End: 2021-02-02
Payer: COMMERCIAL

## 2021-02-02 DIAGNOSIS — M72.2 PLANTAR FASCIITIS: Primary | ICD-10-CM

## 2021-02-02 PROBLEM — M79.671 RIGHT FOOT PAIN: Status: RESOLVED | Noted: 2020-09-09 | Resolved: 2021-02-02

## 2021-02-02 PROCEDURE — 99213 OFFICE O/P EST LOW 20 MIN: CPT | Mod: TEL | Performed by: STUDENT IN AN ORGANIZED HEALTH CARE EDUCATION/TRAINING PROGRAM

## 2021-02-02 RX ORDER — NAPROXEN 500 MG/1
500 TABLET ORAL 2 TIMES DAILY WITH MEALS
Qty: 14 TABLET | Refills: 0 | Status: SHIPPED | OUTPATIENT
Start: 2021-02-02 | End: 2022-02-16

## 2021-02-02 NOTE — PATIENT INSTRUCTIONS
Patient Education   Here is the plan from today's visit    1. Plantar fasciitis  The Naproxen is just for 1 week to help lessen the inflammation. The physical therapy, home stretches and rolling out your foot is what is going to make the biggest difference long term.     - naproxen (NAPROSYN) 500 MG tablet; Take 1 tablet (500 mg) by mouth 2 times daily (with meals)  Dispense: 14 tablet; Refill: 0  - PHYSICAL THERAPY REFERRAL - INTERNAL; Future    This condition may present suddenly, but once you have it, think of it more of a chronic inflammation that must be managed to prevent it from getting worse.   Things you can do:  1) Ice the area! You can use frozen water bottles, and roll the foot over the water bottle, which also massages the area as well.   2) Bring your toes up and point your toes repeatedly in AM before getting out of bed & taking first step. This stretches the area a little before trying to walk.  3) Use orthotics in your shoes all the time (see picture below for an example).   4) Wear supportive shoes, and FLAT shoes, not heels.  5) Use NSAID's for pain- medications like ibuprofen (Advil) or naproxen (Aleve).   6) Avoid walking barefoot in home - pick a pair of shoes to use inside the house.  7) Could consider splinting during sleep if these interventions aren't working (see picture below for example).     Orthotics Picture      Plantar Fasciitis Picture        Plantar Fascia Splint for Nighttime Use           [facility]  Lab Testing:  **If you had lab testing today and your results are reassuring or normal they will be mailed to you or sent through PastBook within 7 days.   **If the lab tests need quick action we will call you with the results.  The phone number we will call with results is # [phone] (home) . If this is not the best number please call our clinic and change the number.    Medication Refills:  If you need any refills please call your  pharmacy and they will contact us.   If you need to  your refill at a new pharmacy, please contact the new pharmacy directly. The new pharmacy will help you get your medications transferred faster.     Scheduling:  If you have any concerns about today's visit or wish to schedule another appointment please call our office during normal business hours [phone] (8-5:00 M-F)    Medical Concerns:  If you have urgent medical concerns please call [phone] at any time of the day.  If you have a medical emergency please call 911.  Again thank you for choosing [facility] and please let us know how we can best partner with you to improve you and your family's health.    Please call or return to clinic if your symptoms don't go away.    Follow up plan  Please make a clinic appointment for follow up with me (LORENA ARMSTRONG) after doing a full course of physical therapy at least 6-8 weeks if pain does not improve. MyChart me if not doing better and we can refer to Sports Medicine.   Thank you for coming to [facility] today.  Lab Testing:  **If you had lab testing today and your results are reassuring or normal they will be mailed to you or sent through [employer] within 7 days.   **If the lab tests need quick action we will call you with the results.  The phone number we will call with results is # [phone] (home) . If this is not the best number please call our clinic and change the number.  Medication Refills:  If you need any refills please call your pharmacy and they will contact us.   If you need to  your refill at a new pharmacy, please contact the new pharmacy directly. The new pharmacy will help you get your medications transferred faster.   Scheduling:  If you have any concerns about today's visit or wish to schedule another appointment please call our office during normal business hours [phone] (8-5:00 M-F)  If a referral was made to a [facility]  Physicians and you don't get a call from central scheduling please call [phone].  If a Mammogram was ordered for you at [facility] call [phone] to schedule or change your appointment.  If you had an XRay/CT/Ultrasound/MRI ordered the number is [phone] to schedule or change your radiology appointment.   Medical Concerns:  If you have urgent medical concerns please call [phone] at any time of the day.    Mehnaz Trujillo, DO

## 2021-02-02 NOTE — PROGRESS NOTES
Telephone Preceptor Attestation:   Patient spoken with and discussed with the resident. I have verified the content of the note, which accurately reflects my assessment of the patient and the plan of care.   Supervising Physician:  Samara Espinal MD

## 2021-03-29 ENCOUNTER — THERAPY VISIT (OUTPATIENT)
Dept: PHYSICAL THERAPY | Facility: CLINIC | Age: 41
End: 2021-03-29
Payer: COMMERCIAL

## 2021-03-29 DIAGNOSIS — M72.2 PLANTAR FASCIITIS OF LEFT FOOT: ICD-10-CM

## 2021-03-29 PROCEDURE — 97035 APP MDLTY 1+ULTRASOUND EA 15: CPT | Mod: GP | Performed by: PHYSICAL THERAPIST

## 2021-03-29 PROCEDURE — 97161 PT EVAL LOW COMPLEX 20 MIN: CPT | Mod: GP | Performed by: PHYSICAL THERAPIST

## 2021-03-29 PROCEDURE — 97110 THERAPEUTIC EXERCISES: CPT | Mod: GP | Performed by: PHYSICAL THERAPIST

## 2021-03-29 NOTE — PROGRESS NOTES
Physical Therapy Initial Evaluation    Physical Therapy Initial Examination/Evaluation  March 29, 2021    Lesly Herrera  is a 40 year old  female referred to physical therapy by Dr. Espinoza for treatment of L plantar foot pain.      DOI/onset 2019  Mechanism of injury Patient notes a gradual onset of pain a few years ago. She notes mild pain that comes and goes about 2 episodes per month that last a few days at a time. She has had no pain the past 6 weeks since seeing her MD and being referred to PT.  DOS n/a  Prior treatment none.     Chief Complaint:   Mild L foot pain.   Pain location: L plantar fascia  Quality: aching  Constant/Intermittent: intermittent  Symptoms have improved since onset.    Current pain 0/10.  Pain at best 0/10.  Pain at worst 2/10.    Symptoms aggravated by currently nothing.    Symptoms improved with no pain currently.       Occupation: medical records.  Job duties:  Prolonged sitting.    Patient having difficulty with ADLs: none.    Patient's goals are to learn preventive exercises.    Patient reports general health as good.  Related medical history no previous hx of L foot pain.    Surgical History:  none.    Imaging: none.    Medications:  none.       Return to MD:  As needed.      Clinical Impression: Patient has had no pain x 6 weeks. She will continue the HEP she was instructed in today with no further PT visits needed unless increased problems arise.    Subjective:  HPI                    Objective:  Standing Alignment:                Ankle/Foot:  Normal    Gait:    Gait Type:  Normal         Flexibility/Screens:       Lower Extremity:  Decreased left lower extremity flexibility:Gastroc and Soleus            Ankle/Foot Evaluation  ROM:    AROM:    Dorsiflexion:  Left:   13  Right:   14  Plantarflexion:  Left:  70    Right:  75  Inversion:  Left:  35     Right:  35  Eversion:  20     Right:  20        Strength is normal.  LIGAMENT TESTING: normal                PALPATION:  Palpation of ankle: mild MF tenderness in L plantar fascia.  Left ankle tenderness present at:  plantar fascia    EDEMA: normal          MOBILITY TESTING: normal                                                                General     ROS    Assessment/Plan:    Patient is a 40 year old female with left side ankle complaints.    Patient has the following significant findings with corresponding treatment plan.                Diagnosis 1:  L plantar fasciitis  Pain -  US, self management and education  Decreased ROM/flexibility - manual therapy and therapeutic exercise  Impaired muscle performance - neuro re-education  Decreased function - therapeutic activities    Therapy Evaluation Codes:   1) History comprised of:   Personal factors that impact the plan of care:      None.    Comorbidity factors that impact the plan of care are:      None.     Medications impacting care: None.  2) Examination of Body Systems comprised of:   Body structures and functions that impact the plan of care:      Ankle.   Activity limitations that impact the plan of care are:      Walking.  3) Clinical presentation characteristics are:   Stable/Uncomplicated.  4) Decision-Making    Low complexity using standardized patient assessment instrument and/or measureable assessment of functional outcome.  Cumulative Therapy Evaluation is: Low complexity.    Previous and current functional limitations:  (See Goal Flow Sheet for this information)    Short term and Long term goals: (See Goal Flow Sheet for this information)     Communication ability:  Patient appears to be able to clearly communicate and understand verbal and written communication and follow directions correctly.  Treatment Explanation - The following has been discussed with the patient:   RX ordered/plan of care  Anticipated outcomes  Possible risks and side effects  This patient would benefit from PT intervention to resume normal activities.   Rehab potential is good.    Frequency:   1 X week, once daily  Duration:  for 1 week  Discharge Plan:  Achieve all LTG.  Independent in home treatment program.  Reach maximal therapeutic benefit.    Patient will continue her home exercise program independently at home with no further PT visits needed unless increased problems arise.    Please refer to the daily flowsheet for treatment today, total treatment time and time spent performing 1:1 timed codes.

## 2021-03-30 NOTE — PROGRESS NOTES
Physical Therapy Initial Evaluation  Subjective:    Patient Health History             Pertinent medical history includes: anemia, chemical dependency, depression and overweight.                Current occupation is Medical Records.   Primary job tasks include:  Computer work and prolonged sitting.                                    Objective:  System    Physical Exam    General     ROS    Assessment/Plan:

## 2021-09-04 ENCOUNTER — HEALTH MAINTENANCE LETTER (OUTPATIENT)
Age: 41
End: 2021-09-04

## 2021-11-18 PROBLEM — M72.2 PLANTAR FASCIITIS OF LEFT FOOT: Status: RESOLVED | Noted: 2021-03-29 | Resolved: 2021-11-18

## 2021-11-18 NOTE — PROGRESS NOTES
Patient did not return after initial evaluation.  Please see initial evaluation from 3/29/21 for discharge status.

## 2022-02-09 ENCOUNTER — OFFICE VISIT (OUTPATIENT)
Dept: FAMILY MEDICINE | Facility: CLINIC | Age: 42
End: 2022-02-09
Payer: COMMERCIAL

## 2022-02-09 VITALS
RESPIRATION RATE: 16 BRPM | SYSTOLIC BLOOD PRESSURE: 135 MMHG | OXYGEN SATURATION: 99 % | BODY MASS INDEX: 31.3 KG/M2 | TEMPERATURE: 98.6 F | DIASTOLIC BLOOD PRESSURE: 85 MMHG | HEART RATE: 78 BPM | WEIGHT: 230.8 LBS

## 2022-02-09 DIAGNOSIS — Z71.85 VACCINE COUNSELING: ICD-10-CM

## 2022-02-09 DIAGNOSIS — Z11.3 SCREENING FOR STD (SEXUALLY TRANSMITTED DISEASE): Primary | ICD-10-CM

## 2022-02-09 LAB
CLUE CELLS: ABNORMAL
HCV AB SERPL QL IA: NONREACTIVE
HIV 1+2 AB+HIV1 P24 AG SERPL QL IA: NONREACTIVE
T PALLIDUM AB SER QL: NONREACTIVE
TRICHOMONAS, WET PREP: ABNORMAL
WBC'S/HIGH POWER FIELD, WET PREP: ABNORMAL
YEAST, WET PREP: ABNORMAL

## 2022-02-09 PROCEDURE — 36415 COLL VENOUS BLD VENIPUNCTURE: CPT | Performed by: FAMILY MEDICINE

## 2022-02-09 PROCEDURE — 86780 TREPONEMA PALLIDUM: CPT | Performed by: FAMILY MEDICINE

## 2022-02-09 PROCEDURE — 87389 HIV-1 AG W/HIV-1&-2 AB AG IA: CPT | Performed by: FAMILY MEDICINE

## 2022-02-09 PROCEDURE — 86803 HEPATITIS C AB TEST: CPT | Performed by: FAMILY MEDICINE

## 2022-02-09 PROCEDURE — 87591 N.GONORRHOEAE DNA AMP PROB: CPT | Performed by: FAMILY MEDICINE

## 2022-02-09 PROCEDURE — 99213 OFFICE O/P EST LOW 20 MIN: CPT | Performed by: FAMILY MEDICINE

## 2022-02-09 PROCEDURE — 87210 SMEAR WET MOUNT SALINE/INK: CPT | Performed by: FAMILY MEDICINE

## 2022-02-09 PROCEDURE — 87491 CHLMYD TRACH DNA AMP PROBE: CPT | Performed by: FAMILY MEDICINE

## 2022-02-09 ASSESSMENT — ENCOUNTER SYMPTOMS
DYSURIA: 0
GASTROINTESTINAL NEGATIVE: 1
CONSTITUTIONAL NEGATIVE: 1
PSYCHIATRIC NEGATIVE: 1

## 2022-02-09 NOTE — PROGRESS NOTES
Assessment & Plan     Screening for STD (sexually transmitted disease)  - HIV Antigen Antibody Combo; Future  - Hepatitis C antibody; Future  - Treponema Abs w Reflex to RPR and Titer; Future  - Chlamydia trachomatis/Neisseria gonorrheae by PCR - Clinic Collect  - Wet prep  - HIV Antigen Antibody Combo  - Hepatitis C antibody  - Treponema Abs w Reflex to RPR and Titer    Vaccine counseling  - declined COVID and flu vaccine      27 minutes spent on the date of the encounter doing chart review, patient visit and documentation     Results via My Chart. Follow-up with PCP as needed.     Jillian Mcdaniels MD  [facility]  ===============================    Subjective   Lesly is a 41 year old who presents for the following health issues   Chief Complaint   Patient presents with     STD     Patient reports wanting to get tested for HIV,HepB and have urine to test for GC/CL if appropriate.       HPI   Patient not previously known to me. Presents for STI testing. Reports that male partner cheated on her in December and she ended the relationship. She has no symptoms of STI. Was not using contraception but has had period since. Reports feeling ok about relationship ending.     Review of Systems   Constitutional: Negative.    Gastrointestinal: Negative.    Genitourinary: Negative for dysuria, genital sores, pelvic pain, vaginal discharge and vaginal pain.   Psychiatric/Behavioral: Negative.           Objective    /85   Pulse 78   Temp 98.6  F (37  C) (Oral)   Resp 16   Wt 104.7 kg (230 lb 12.8 oz)   SpO2 99%   BMI 31.30 kg/m       Physical Exam  Constitutional:       General: She is not in acute distress.     Appearance: She is not ill-appearing.   Abdominal:      Palpations: Abdomen is soft.      Tenderness: There is no abdominal tenderness.   Genitourinary:     General: Normal vulva.      Comments: Small, white discharge without odor. Cervix appears normal. No CMT. Wet prep and GC obtained.  Not due for pap  Neurological:      Mental Status: She is alert.

## 2022-02-10 LAB
C TRACH DNA SPEC QL PROBE+SIG AMP: NEGATIVE
N GONORRHOEA DNA SPEC QL NAA+PROBE: NEGATIVE

## 2022-02-16 ENCOUNTER — OFFICE VISIT (OUTPATIENT)
Dept: FAMILY MEDICINE | Facility: CLINIC | Age: 42
End: 2022-02-16
Payer: COMMERCIAL

## 2022-02-16 VITALS
OXYGEN SATURATION: 97 % | DIASTOLIC BLOOD PRESSURE: 71 MMHG | HEART RATE: 75 BPM | TEMPERATURE: 98.1 F | RESPIRATION RATE: 16 BRPM | SYSTOLIC BLOOD PRESSURE: 117 MMHG

## 2022-02-16 DIAGNOSIS — G44.209 TENSION HEADACHE: ICD-10-CM

## 2022-02-16 DIAGNOSIS — F41.9 ANXIETY: ICD-10-CM

## 2022-02-16 DIAGNOSIS — Z13.9 SCREENING FOR CONDITION: Primary | ICD-10-CM

## 2022-02-16 PROCEDURE — 99214 OFFICE O/P EST MOD 30 MIN: CPT | Performed by: FAMILY MEDICINE

## 2022-02-16 RX ORDER — GABAPENTIN 100 MG/1
100 CAPSULE ORAL 3 TIMES DAILY PRN
Qty: 90 CAPSULE | Refills: 1 | Status: SHIPPED | OUTPATIENT
Start: 2022-02-16 | End: 2022-06-14

## 2022-02-16 RX ORDER — HYDROXYZINE HYDROCHLORIDE 25 MG/1
25-50 TABLET, FILM COATED ORAL 3 TIMES DAILY PRN
Qty: 60 TABLET | Refills: 3 | Status: SHIPPED | OUTPATIENT
Start: 2022-02-16 | End: 2023-03-23

## 2022-02-16 RX ORDER — SENNOSIDES 8.6 MG
650 CAPSULE ORAL EVERY 8 HOURS PRN
Qty: 50 TABLET | Refills: 1 | Status: SHIPPED | OUTPATIENT
Start: 2022-02-16 | End: 2023-03-23

## 2022-02-16 RX ORDER — NAPROXEN 500 MG/1
500 TABLET ORAL 2 TIMES DAILY PRN
Qty: 50 TABLET | Refills: 1 | Status: SHIPPED | OUTPATIENT
Start: 2022-02-16 | End: 2023-03-23

## 2022-02-16 NOTE — PATIENT INSTRUCTIONS
"Recommendations for Insomnia  1. Only go to bed when sleepy.  2. If unable to fall asleep within 20 minutes, get out of bed, go to a different room and read, preferable something uninteresting.  3. Remove or cover up the clock in your bedroom.  3. Avoid caffeine 6 hours before bed.  4. Avoid watching TV one hour before bed time especially not  in bed or leaving the TV on all night.  5. Designate the hour before your bed time as \"worry time.\" Keep a paper to-do list, make a new list every night before going to bed. The goal is for this list to reduce mind racing.   6. Try to exercise 30-60 minutes daily-but avoid vigorous exercise 4-6 hours before bed  7. Try a meditation exercise called the Body Scan  Body Scan Meditation  Lying down now... Breathe in s-l-o-w-l-y and deeply through your nose. Feel your abdomen move outwards as your diaphragm contracts and draws air into your lungs. Your chest should not rise noticeably.  While breathing slowly, direct attention to your left foot. Feel your foot. Curl your toes once to fix your awareness to it. Now relax...  As you breathe in through your nostrils, slowly scan your left leg from foot to knee, and up through your thigh.   As you breathe out, trace your leg down to your foot. Do this 3 times, then take your mind off your breath and remain with your foot.  Feel the sensations in your foot. Simply become aware of them. Scan your left lower leg. Accept any tension or discomfort. Scan slowly, up through your thigh now.  If thoughts appear, that's fine. Gently come back to your breath, and shift awareness over to your right foot.   Slowly inhale while scanning through your right calf, knee, and thigh... Exhale and scan back down. S-l-o-w-l-y. Now let go of your breath and remain with your foot.  Scan for any sensation in your foot... calf... Thigh... Simply accept all sensations and feel what happens. Relax...  Now focus on your stomach. Feel it r-i-[id number]-i-[id number]-g as you breathe " in. Sinking as you exhale. Nice and slow. Your heart probably slows down. This is normal. Remain aware of your stomach, your breath... up and down. Become aware of sensations. Relax...  Now follow the same procedure with your left hand and arm as you did with your leg. You may clench your fist at first to really direct your awareness to your left hand. Breathe...  Now scan up along the length of your arm, to your chest. Then down your right arm to your right hand. Remain there. Breathe. Sense and scan. Relax...  Come back up to your chest. Continue scanning up along your neck and to your face. Gently clench your jaws and release. Feel the sensations in your jaws, your throat. Breathe and scan. Feel how the back of your head rests against the floor.   Scan the top of your head. Relax...  Now detach from all body parts. Breathe... Feel how everything is connected, resting gently on the floor. Just breathe, let any sensation come to you. Accept it as a part of you. Return to your breathing. You are big, sensations are small parts of you. They fluctuate, come and go.  Just breathe for a minute and feel your body. Then sit up slowly.

## 2022-02-16 NOTE — LETTER
[place] [address] [place] [address]  [address]  [facility] [address]  [phone]          February 16, 2022    RE:  Lesly Milneraman Herrera                                                                                                                                                       [address]  [facility] [address]            To whom it may concern:    Lesly Milneraman Herrera is under my professional care for  She may return to work on 4/17/22      Sincerely,          Samuel Burt MD

## 2022-02-16 NOTE — PROGRESS NOTES
[facility] Progress Note          Assessment & Plan     Tension headache  Symptoms are most likely consistent with a tension headache.  Though migraine-like symptoms are also present will treat with naproxen and acetaminophen and asked patient to follow-up if not improved.  - naproxen (NAPROSYN) 500 MG tablet; Take 1 tablet (500 mg) by mouth 2 times daily as needed for moderate pain or headaches  - acetaminophen (TYLENOL) 650 MG CR tablet; Take 1 tablet (650 mg) by mouth every 8 hours as needed for mild pain or fever    Anxiety  Patient also reports that her anxiety is has been more difficult to control and she feels this is related to a flare in her headaches.  Hydroxyzine has been effective but basically cause her to go to sleep and that is problematic for work shows you still continues hydroxyzine at night for sleep as needed but also will give a trial of gabapentin for anxiety at this point patient is not interested in taking a daily anxiety drug though we also talked about another other options are therapy and also doing some exercise 30 to 60 minutes most days of the week which she is interested in trying.  - hydrOXYzine (ATARAX) 25 MG tablet; Take 1-2 tablets (25-50 mg) by mouth 3 times daily as needed for anxiety  - gabapentin (NEURONTIN) 100 MG capsule; Take 1 capsule (100 mg) by mouth 3 times daily as needed (anxiety)                  Return in about 4 weeks (around 3/16/2022), or if symptoms worsen or fail to improve, for Recheck anxiety and headaches.    Samuel Burt MD  [facility] JESUS MANUEL Grace is a 41 year old who presents for the following health issues   Patient presents with:  Headache: pt here due to ongoing headache for 4-6 months that comes and goes. headache localized to top of head. pt relates headaches to periods of high stress and has been experiencing light sensitivity. has not gotten better or worse.     Refill Request: hydroxizine   Letter  Request: pt is requesting a doctors note due to being out of work due to headaches and anxiety.         HPI   Headache   Present for only 3-6 times, this time it has lasted 2 days, Usually is shorter is only a day-gets better with dark, quiet room. 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 Feels like anxiety has worsened. Several stressful events over the last several months.   +light and sound sensitivity. No numbness tingling or weakness         Review of Systems         Objective    /71   Pulse 75   Temp 98.1  F (36.7  C) (Oral)   Resp 16   SpO2 97%   There is no height or weight on file to calculate BMI.  Physical Exam  Vitals reviewed.   Constitutional:       General: She is not in acute distress.     Appearance: She is well-developed.   HENT:      Head: Normocephalic and atraumatic.      Right Ear: External ear normal.      Left Ear: External ear normal.      Nose: Nose normal.   Eyes:      Conjunctiva/sclera: Conjunctivae normal.      Pupils: Pupils are equal, round, and reactive to light.   Neck:      Thyroid: No thyromegaly.   Cardiovascular:      Rate and Rhythm: Normal rate and regular rhythm.      Heart sounds: Normal heart sounds.   Pulmonary:      Effort: Pulmonary effort is normal.      Breath sounds: Normal breath sounds.   Musculoskeletal:         General: Normal range of motion.      Cervical back: Normal range of motion.   Neurological:      Mental Status: She is alert and oriented to person, place, and time.      Cranial Nerves: No cranial nerve deficit.      Motor: No abnormal muscle tone.      Coordination: Coordination normal.      Gait: Gait normal.      Deep Tendon Reflexes: Reflexes are normal and symmetric. Reflexes normal. Babinski sign absent on the right side. Babinski sign absent on the left side.      Reflex Scores:       Patellar reflexes are 2+ on the right side and 2+ on the left side.       Achilles reflexes are 2+ on the right side and 2+ on the left side.  Psychiatric:          Speech: Speech normal.         Behavior: Behavior normal.         Thought Content: Thought content normal.         Judgment: Judgment normal.

## 2022-02-19 ENCOUNTER — HEALTH MAINTENANCE LETTER (OUTPATIENT)
Age: 42
End: 2022-02-19

## 2022-10-16 ENCOUNTER — HEALTH MAINTENANCE LETTER (OUTPATIENT)
Age: 42
End: 2022-10-16

## 2022-12-07 ENCOUNTER — OFFICE VISIT (OUTPATIENT)
Dept: FAMILY MEDICINE | Facility: CLINIC | Age: 42
End: 2022-12-07
Payer: COMMERCIAL

## 2022-12-07 VITALS
BODY MASS INDEX: 32.19 KG/M2 | HEART RATE: 83 BPM | RESPIRATION RATE: 18 BRPM | SYSTOLIC BLOOD PRESSURE: 129 MMHG | HEIGHT: 71 IN | TEMPERATURE: 99 F | DIASTOLIC BLOOD PRESSURE: 86 MMHG | OXYGEN SATURATION: 99 %

## 2022-12-07 DIAGNOSIS — F41.9 ANXIETY: ICD-10-CM

## 2022-12-07 DIAGNOSIS — Z30.011 ENCOUNTER FOR INITIAL PRESCRIPTION OF CONTRACEPTIVE PILLS: ICD-10-CM

## 2022-12-07 DIAGNOSIS — J06.9 UPPER RESPIRATORY TRACT INFECTION, UNSPECIFIED TYPE: Primary | ICD-10-CM

## 2022-12-07 LAB
FLUAV RNA SPEC QL NAA+PROBE: NEGATIVE
FLUBV RNA RESP QL NAA+PROBE: NEGATIVE
RSV RNA SPEC NAA+PROBE: NEGATIVE
SARS-COV-2 RNA RESP QL NAA+PROBE: NEGATIVE

## 2022-12-07 PROCEDURE — 87637 SARSCOV2&INF A&B&RSV AMP PRB: CPT | Performed by: FAMILY MEDICINE

## 2022-12-07 PROCEDURE — 99214 OFFICE O/P EST MOD 30 MIN: CPT | Mod: CS | Performed by: FAMILY MEDICINE

## 2022-12-07 RX ORDER — GABAPENTIN 100 MG/1
100 CAPSULE ORAL 3 TIMES DAILY PRN
Qty: 90 CAPSULE | Refills: 1 | Status: SHIPPED | OUTPATIENT
Start: 2022-12-07 | End: 2023-03-23

## 2022-12-07 RX ORDER — ACETAMINOPHEN AND CODEINE PHOSPHATE 120; 12 MG/5ML; MG/5ML
0.35 SOLUTION ORAL DAILY
Qty: 90 TABLET | Refills: 3 | Status: SHIPPED | OUTPATIENT
Start: 2022-12-07 | End: 2023-03-23

## 2022-12-07 NOTE — LETTER
[address]  [address]  [facility] [address]  [phone]       RETURN TO WORK/SCHOOL FORM    12/7/2022    Re: Lesly Herrera  1980      To Whom It May Concern:    Lesly Herrera was seen in clinic today.  She may return to work without restrictions on depending on outcome of screening tests for Covid and influenza.    Let us know if you have further question,    Sincerely,      Maegan Carter MD

## 2022-12-07 NOTE — PROGRESS NOTES
Assessment & Plan     Upper respiratory tract infection, unspecified type  Testing performed today  Open to treatment if positive for influenza or Covid  - Symptomatic Influenza A/B & SARS-CoV2 (COVID-19) Virus PCR Multiplex Nose; Future  - Symptomatic Influenza A/B & SARS-CoV2 (COVID-19) Virus PCR Multiplex Nose    Anxiety  Refill provided  - gabapentin (NEURONTIN) 100 MG capsule; Take 1 capsule (100 mg) by mouth 3 times daily as needed (anxiety)    Encounter for initial prescription of contraceptive pills  Discussed options, still considering IUD (likely hormonal)  Will start POP for now and consider other options, ideally should be non-estrogen method if still smoking at >35 years old  - norethindrone (MICRONOR) 0.35 MG tablet; Take 1 tablet (0.35 mg) by mouth daily                 Return if symptoms worsen or fail to improve.    Maegan Carter MD  [facility] JESUS MANUEL Grace is a 42 year old, presenting for the following health issues:  URI (Cold/Flu like symptoms since yesterday)      HPI     Acute Illness  Acute illness concerns: started yesterday  Onset/Duration: past 24 hrs  Symptoms:  Fever: low-grade  Chills/Sweats: YES  Headache (location?): YES  Sinus Pressure: No  Conjunctivitis:  No  Ear Pain: no  Rhinorrhea: YES, mild  Congestion: No  Sore Throat: a little bit yesterday, now better - not like when she's had strep  Cough: YES - nonproductive, dry  Wheeze: No  Decreased Appetite: No  Nausea: No  Vomiting: No  Diarrhea: YES  Dysuria/Freq.: No  Dysuria or Hematuria: No  Fatigue/Achiness: YES  Sick/Strep Exposure: Unsure - something was going around the office  Therapies tried and outcome: None      UTD with flu shot  Has not received COVID vaccines    Also  - interested in resuming contraception  - was on pills in the past (at one point experienced something that sounds like erythema nodosum - painful bumps on her legs), only happened with Arina  - never tried the ring,  "doesn't think she'd use it  - 1 partner, local, wants something ongoing and reliable  - has considered IUD and implant, not sure about that    Smoking - 3 cig/day (less that before)    Review of Systems   Constitutional, HEENT, cardiovascular, pulmonary, gi and gu systems are negative, except as otherwise noted.      Objective    /86   Pulse 83   Temp 99  F (37.2  C)   Resp 18   Ht 1.803 m (5' 11\")   SpO2 99%   BMI 32.19 kg/m    Body mass index is 32.19 kg/m .  Physical Exam  Constitutional:       General: She is not in acute distress.     Appearance: Normal appearance.   HENT:      Head: Normocephalic and atraumatic.      Right Ear: Tympanic membrane normal.      Left Ear: Tympanic membrane normal.      Nose: Congestion and rhinorrhea present.      Mouth/Throat:      Mouth: Mucous membranes are dry.      Pharynx: Posterior oropharyngeal erythema (mild) present. No oropharyngeal exudate.   Eyes:      Extraocular Movements: Extraocular movements intact.      Conjunctiva/sclera: Conjunctivae normal.   Cardiovascular:      Rate and Rhythm: Normal rate and regular rhythm.      Heart sounds: Normal heart sounds.   Pulmonary:      Effort: Pulmonary effort is normal.      Breath sounds: Normal breath sounds. No wheezing or rales.   Abdominal:      Palpations: Abdomen is soft.   Musculoskeletal:         General: Normal range of motion.      Cervical back: Normal range of motion.   Lymphadenopathy:      Cervical: No cervical adenopathy.   Neurological:      General: No focal deficit present.      Mental Status: She is alert and oriented to person, place, and time.   Psychiatric:         Mood and Affect: Mood normal.         Behavior: Behavior normal.                            "

## 2022-12-07 NOTE — PATIENT INSTRUCTIONS
Here's a good resource on IUDs: https://www.reproductiveaccess.org/resource/iud-facts/    And on the implant: https://www.reproductiveaccess.org/resource/progestin-implant/    Also - if you just want to look at all of your options again: https://www.reproductiveaccess.org/resource/bc-fact-sheet/

## 2022-12-11 NOTE — RESULT ENCOUNTER NOTE
Hector Grace    Here are your recent results - hopefully you saw them already.  All of these are normal and reassuring.  Please let me know if you have any further questions!    Sincerely,    Maegan Carter MD

## 2023-03-23 ENCOUNTER — OFFICE VISIT (OUTPATIENT)
Dept: FAMILY MEDICINE | Facility: CLINIC | Age: 43
End: 2023-03-23
Payer: COMMERCIAL

## 2023-03-23 VITALS
HEIGHT: 72 IN | OXYGEN SATURATION: 98 % | RESPIRATION RATE: 16 BRPM | DIASTOLIC BLOOD PRESSURE: 88 MMHG | SYSTOLIC BLOOD PRESSURE: 136 MMHG | TEMPERATURE: 98.8 F | HEART RATE: 98 BPM | WEIGHT: 228.6 LBS | BODY MASS INDEX: 30.96 KG/M2

## 2023-03-23 DIAGNOSIS — Z00.00 PREVENTATIVE HEALTH CARE: ICD-10-CM

## 2023-03-23 DIAGNOSIS — F32.A DEPRESSION, UNSPECIFIED DEPRESSION TYPE: ICD-10-CM

## 2023-03-23 DIAGNOSIS — F41.9 ANXIETY: ICD-10-CM

## 2023-03-23 DIAGNOSIS — Z30.42 ENCOUNTER FOR DEPO-PROVERA CONTRACEPTION: Primary | ICD-10-CM

## 2023-03-23 LAB — HCG UR QL: NEGATIVE

## 2023-03-23 PROCEDURE — 90715 TDAP VACCINE 7 YRS/> IM: CPT | Performed by: STUDENT IN AN ORGANIZED HEALTH CARE EDUCATION/TRAINING PROGRAM

## 2023-03-23 PROCEDURE — 90471 IMMUNIZATION ADMIN: CPT | Performed by: STUDENT IN AN ORGANIZED HEALTH CARE EDUCATION/TRAINING PROGRAM

## 2023-03-23 PROCEDURE — 96372 THER/PROPH/DIAG INJ SC/IM: CPT | Performed by: STUDENT IN AN ORGANIZED HEALTH CARE EDUCATION/TRAINING PROGRAM

## 2023-03-23 PROCEDURE — 99214 OFFICE O/P EST MOD 30 MIN: CPT | Mod: 25 | Performed by: STUDENT IN AN ORGANIZED HEALTH CARE EDUCATION/TRAINING PROGRAM

## 2023-03-23 PROCEDURE — 81025 URINE PREGNANCY TEST: CPT | Performed by: STUDENT IN AN ORGANIZED HEALTH CARE EDUCATION/TRAINING PROGRAM

## 2023-03-23 RX ORDER — GABAPENTIN 100 MG/1
100 CAPSULE ORAL 3 TIMES DAILY PRN
Qty: 90 CAPSULE | Refills: 1 | Status: SHIPPED | OUTPATIENT
Start: 2023-03-23 | End: 2023-11-21

## 2023-03-23 RX ORDER — GABAPENTIN 100 MG/1
100 CAPSULE ORAL 3 TIMES DAILY PRN
Qty: 90 CAPSULE | Refills: 1 | Status: CANCELLED | OUTPATIENT
Start: 2023-03-23

## 2023-03-23 RX ADMIN — MEDROXYPROGESTERONE ACETATE 150 MG: 150 INJECTION, SUSPENSION INTRAMUSCULAR at 05:00

## 2023-03-23 NOTE — PROGRESS NOTES
Preceptor Attestation:  Patient seen and evaluated in person. I discussed the patient with the resident. I have verified the content of the note, which accurately reflects my assessment of the patient and the plan of care.   Supervising Physician:  Corinna Orozco DO

## 2023-03-23 NOTE — PROGRESS NOTES
Assessment & Plan     Encounter for Depo-Provera contraception  Patient desires to start Depo-Provera.  Does not want to do pills as she has trouble remembering every day.  Does not want to do a LARC as she does not want anything inside of her body.  Urine pregnancy test is negative today.  As her LMP is greater than 7 days from today and last unprotected sex was 7 days ago, asked her to take a urine pregnancy test then 1 to 2 weeks to ensure that her test was not too early.  We will start Depo-Provera today.  - HCG qualitative urine  - medroxyPROGESTERone (DEPO-PROVERA) injection 150 mg    Anxiety  Depression, unspecified depression type  Patient requesting refill of gabapentin today as she has been more frequently.  States that her mother was on hospice December 2022 and additionally she has been having increased work stressors.  As such she has been using the gabapentin which was started by Dr. Burt for anxiety much more frequently.  Suspect that she has some form of adjustment disorder given recent stressors in the event of her mother passing.  Patient is open to therapy today.  Referral sent.  Also discussed that there is a very reasonable if she would desire to start a different kind of medication for mental health as gabapentin is not the ideal option for depression or anxiety management especially if she is needing to take it daily.  Patient expresses understanding.  Given that this appears to be a response to an acute stress, would be reasonable to continue gabapentin with therapy or therapy with a SSRI  - Adult Mental Health  Referral  - gabapentin (NEURONTIN) 100 MG capsule  Dispense: 90 capsule; Refill: 1    Preventative health care  Declined covid, flu, pneumo; accepted tetanus    - TDAP VACCINE (Adacel, Boostrix)  [[id number]]      Return in about 3 months (around 6/21/2023).    Jaz Amos MD  [facility] JESUS MANUEL Grace is a 42 year old, presenting for the  following health issues:  Contraception (Depo shot)    Additional Questions 3/23/2023   Roomed by Melida   Accompanied by SELF     HPI   Last been on depo shot 18 years ago  - has had abnormal menstrual bleeding with     Gets heavy bleeding, so she is hopeful the depo will help with the bleeding    Previously prescribed OCP, and did not want to do anything that was daily contraceptive    LMP: 3/7/2022  Last unprotected sex 1 week ago    Prescribed gabapentin as needed and has needed more of it lately due         Objective    /88   Pulse 98   Temp 98.8  F (37.1  C) (Oral)   Resp 16   Ht 1.829 m (6')   Wt 103.7 kg (228 lb 9.6 oz)   LMP 03/07/2023   SpO2 98%   BMI 31.00 kg/m    Body mass index is 31 kg/m .  Physical Exam  Constitutional:       General: She is not in acute distress.     Appearance: Normal appearance. She is not ill-appearing, toxic-appearing or diaphoretic.   HENT:      Head: Normocephalic and atraumatic.   Eyes:      Conjunctiva/sclera: Conjunctivae normal.   Cardiovascular:      Rate and Rhythm: Normal rate.   Pulmonary:      Effort: Pulmonary effort is normal. No respiratory distress.   Neurological:      Mental Status: She is alert.   Psychiatric:         Mood and Affect: Mood normal.         Behavior: Behavior normal.         Thought Content: Thought content normal.         Judgment: Judgment normal.                Initial Depo Injection     Is the patient within 1st 5 days of a normal period?   No  Is the patient post delivery ?      No  If yes, is she breastfeeding?  N/A  If yes breastfeeding, shot given within 6 weeks after delivery?    N/A.  If no breastfeeding, shot given within 5 days of delivery?  N/A    BP Readings from Last 1 Encounters:   03/23/23 136/88     Patient's last menstrual period was 03/07/2023.    Date range to return is given to patient for her next dose: 6/2023     See Medication Note for administration information    Staff Sig: Jaz Amos MD

## 2023-03-26 PROBLEM — Z56.0 UNEMPLOYMENT: Status: RESOLVED | Noted: 2019-10-15 | Resolved: 2023-03-26

## 2023-03-26 RX ORDER — MEDROXYPROGESTERONE ACETATE 150 MG/ML
150 INJECTION, SUSPENSION INTRAMUSCULAR
Status: DISCONTINUED | OUTPATIENT
Start: 2023-03-26 | End: 2023-10-10

## 2023-03-26 ASSESSMENT — ANXIETY QUESTIONNAIRES
6. BECOMING EASILY ANNOYED OR IRRITABLE: NOT AT ALL
GAD7 TOTAL SCORE: 4
7. FEELING AFRAID AS IF SOMETHING AWFUL MIGHT HAPPEN: SEVERAL DAYS
GAD7 TOTAL SCORE: 4
5. BEING SO RESTLESS THAT IT IS HARD TO SIT STILL: NOT AT ALL
3. WORRYING TOO MUCH ABOUT DIFFERENT THINGS: SEVERAL DAYS
IF YOU CHECKED OFF ANY PROBLEMS ON THIS QUESTIONNAIRE, HOW DIFFICULT HAVE THESE PROBLEMS MADE IT FOR YOU TO DO YOUR WORK, TAKE CARE OF THINGS AT HOME, OR GET ALONG WITH OTHER PEOPLE: SOMEWHAT DIFFICULT
1. FEELING NERVOUS, ANXIOUS, OR ON EDGE: SEVERAL DAYS
2. NOT BEING ABLE TO STOP OR CONTROL WORRYING: SEVERAL DAYS

## 2023-03-26 ASSESSMENT — PATIENT HEALTH QUESTIONNAIRE - PHQ9
5. POOR APPETITE OR OVEREATING: NOT AT ALL
SUM OF ALL RESPONSES TO PHQ QUESTIONS 1-9: 8

## 2023-03-26 NOTE — PATIENT INSTRUCTIONS
Patient Education   Here is the plan from today's visit    1. Encounter for Depo-Provera contraception  Your pregnancy test was negative; we started you on the Depo. As you know, this is an every 3 month shot, so please come back in 3 months to get your next dose. Please let us know if you feel like this medication is not working for you! We can always talk about the patch or the ring as well!     2. Anxiety  3. Depression, unspecified depression type  It sounds like it has been a very hard start of the year for you. I am happy that we could put in a referral for therapy and please let us know if you would like a different medication other than gabapentin to help during this time period!    3. Preventative health care  You got your tetanus shot!      Follow up plan  Return in about 3 months (around 6/21/2023).    Thank you for coming to [facility] today.  Lab Testing:  **If you had lab testing today and your results are reassuring or normal they will be mailed to you or sent through [facility] within 7 days.   **If the lab tests need quick action we will call you with the results.  **If you are having labs done on a different day, please call [phone] to schedule at [facility] or [phone] for other [facility] locations. Labs do not offer walk-in appointments.  The phone number we will call with results is # [phone] (home) . If this is not the best number please call our clinic and change the number.  Medication Refills:  If you need any refills please call your pharmacy and they will contact us.   If you need to  your refill at a new pharmacy, please contact the new pharmacy directly. The new pharmacy will help you get your medications transferred faster.   Scheduling:  If you have any concerns about today's visit or wish to schedule another appointment please call our office during normal business hours [phone] (8-5:00 M-F). If you can no longer make a scheduled  visit, please cancel via [employer] or call us to cancel.   If a referral was made to an [facility] specialty provider and you do not get a call from central scheduling, please refer to directions on your visit summary or call our office during normal business hours for assistance.   Medical Concerns:  If you have urgent medical concerns please call [phone] at any time of the day.    Jaz Amos MD

## 2023-04-01 ENCOUNTER — HEALTH MAINTENANCE LETTER (OUTPATIENT)
Age: 43
End: 2023-04-01

## 2023-05-12 ENCOUNTER — OFFICE VISIT (OUTPATIENT)
Dept: FAMILY MEDICINE | Facility: CLINIC | Age: 43
End: 2023-05-12
Payer: COMMERCIAL

## 2023-05-12 VITALS
TEMPERATURE: 99.1 F | HEART RATE: 77 BPM | SYSTOLIC BLOOD PRESSURE: 135 MMHG | DIASTOLIC BLOOD PRESSURE: 85 MMHG | BODY MASS INDEX: 31.19 KG/M2 | WEIGHT: 230 LBS | RESPIRATION RATE: 16 BRPM

## 2023-05-12 DIAGNOSIS — D50.8 OTHER IRON DEFICIENCY ANEMIA: ICD-10-CM

## 2023-05-12 DIAGNOSIS — N92.1 MENORRHAGIA WITH IRREGULAR CYCLE: Primary | ICD-10-CM

## 2023-05-12 LAB
ERYTHROCYTE [DISTWIDTH] IN BLOOD BY AUTOMATED COUNT: 17.2 % (ref 10–15)
FERRITIN SERPL-MCNC: 17 NG/ML (ref 6–175)
FIBRINOGEN PPP-MCNC: 358 MG/DL (ref 170–490)
HCT VFR BLD AUTO: 38.9 % (ref 35–47)
HGB BLD-MCNC: 11.6 G/DL (ref 11.7–15.7)
HOLD SPECIMEN: NORMAL
INR PPP: 0.96 (ref 0.85–1.15)
IRON BINDING CAPACITY (ROCHE): 359 UG/DL (ref 240–430)
IRON SATN MFR SERPL: 5 % (ref 15–46)
IRON SERPL-MCNC: 18 UG/DL (ref 37–145)
MCH RBC QN AUTO: 25.2 PG (ref 26.5–33)
MCHC RBC AUTO-ENTMCNC: 29.8 G/DL (ref 31.5–36.5)
MCV RBC AUTO: 84 FL (ref 78–100)
PLATELET # BLD AUTO: 458 10E3/UL (ref 150–450)
RBC # BLD AUTO: 4.61 10E6/UL (ref 3.8–5.2)
WBC # BLD AUTO: 8.8 10E3/UL (ref 4–11)

## 2023-05-12 PROCEDURE — 99214 OFFICE O/P EST MOD 30 MIN: CPT | Mod: GC | Performed by: STUDENT IN AN ORGANIZED HEALTH CARE EDUCATION/TRAINING PROGRAM

## 2023-05-12 PROCEDURE — 36415 COLL VENOUS BLD VENIPUNCTURE: CPT | Performed by: STUDENT IN AN ORGANIZED HEALTH CARE EDUCATION/TRAINING PROGRAM

## 2023-05-12 PROCEDURE — 85027 COMPLETE CBC AUTOMATED: CPT | Performed by: STUDENT IN AN ORGANIZED HEALTH CARE EDUCATION/TRAINING PROGRAM

## 2023-05-12 PROCEDURE — 83550 IRON BINDING TEST: CPT | Performed by: STUDENT IN AN ORGANIZED HEALTH CARE EDUCATION/TRAINING PROGRAM

## 2023-05-12 PROCEDURE — 85384 FIBRINOGEN ACTIVITY: CPT | Performed by: STUDENT IN AN ORGANIZED HEALTH CARE EDUCATION/TRAINING PROGRAM

## 2023-05-12 PROCEDURE — 85610 PROTHROMBIN TIME: CPT | Performed by: STUDENT IN AN ORGANIZED HEALTH CARE EDUCATION/TRAINING PROGRAM

## 2023-05-12 PROCEDURE — 83540 ASSAY OF IRON: CPT | Performed by: STUDENT IN AN ORGANIZED HEALTH CARE EDUCATION/TRAINING PROGRAM

## 2023-05-12 PROCEDURE — 82728 ASSAY OF FERRITIN: CPT | Performed by: STUDENT IN AN ORGANIZED HEALTH CARE EDUCATION/TRAINING PROGRAM

## 2023-05-13 PROBLEM — N92.1 MENORRHAGIA WITH IRREGULAR CYCLE: Status: ACTIVE | Noted: 2023-05-13

## 2023-05-13 PROBLEM — D50.9 ANEMIA, IRON DEFICIENCY: Status: ACTIVE | Noted: 2023-05-13

## 2023-05-13 RX ORDER — HEPARIN SODIUM,PORCINE 10 UNIT/ML
5 VIAL (ML) INTRAVENOUS
Status: CANCELLED | OUTPATIENT
Start: 2023-05-13

## 2023-05-13 RX ORDER — HEPARIN SODIUM (PORCINE) LOCK FLUSH IV SOLN 100 UNIT/ML 100 UNIT/ML
5 SOLUTION INTRAVENOUS
Status: CANCELLED | OUTPATIENT
Start: 2023-05-13

## 2023-05-13 RX ORDER — EPINEPHRINE 1 MG/ML
0.3 INJECTION, SOLUTION, CONCENTRATE INTRAVENOUS EVERY 5 MIN PRN
Status: CANCELLED | OUTPATIENT
Start: 2023-05-13

## 2023-05-13 RX ORDER — ALBUTEROL SULFATE 0.83 MG/ML
2.5 SOLUTION RESPIRATORY (INHALATION)
Status: CANCELLED | OUTPATIENT
Start: 2023-05-13

## 2023-05-13 RX ORDER — DIPHENHYDRAMINE HYDROCHLORIDE 50 MG/ML
50 INJECTION INTRAMUSCULAR; INTRAVENOUS
Status: CANCELLED
Start: 2023-05-13

## 2023-05-13 RX ORDER — ALBUTEROL SULFATE 90 UG/1
1-2 AEROSOL, METERED RESPIRATORY (INHALATION)
Status: CANCELLED
Start: 2023-05-13

## 2023-05-13 RX ORDER — METHYLPREDNISOLONE SODIUM SUCCINATE 125 MG/2ML
125 INJECTION, POWDER, LYOPHILIZED, FOR SOLUTION INTRAMUSCULAR; INTRAVENOUS
Status: CANCELLED
Start: 2023-05-13

## 2023-05-13 NOTE — PROGRESS NOTES
Assessment & Plan     Menorrhagia with irregular cycle  Other iron deficiency anemia  Patient with chronically heavy periods, now with approx 18 days of increasingly heavy vaginal bleeding. This has improved after a few doses of ibuprofen 800mg. Has had DEPO on board since March, had menstrual period in March. Could be irregular bleeding secondary to early use of Depoprovera, especially in context of her previous heavy periods, however given patient's age should receive US and if any abnormalities in uterine stripe should get endometrial bx. Also considering fibroid or other intrauterine structural abnormality.  Labs notable for hgb 11.6, iron 18, iron sat 5, ferritin 17. INR and fibrinogin within expected limits. Pelvic US scheduled for next week. Needs UPT even if low suspicion for pregnancy (would accept home test but if positive would need to track levels).   - US Pelvic Complete with Transvaginal; Future  - CBC with Platelets and Reflex to Iron Studies; Future  - INR; Future  - Fibrinogen activity; Future  - CBC with Platelets and Reflex to Iron Studies  - INR  - Fibrinogen activity  - Iron & Iron Binding Capacity  - Ferritin  - Iron venofer infusion X3 ordered    Needs US within the next week  No follow-ups on file.    Nelli Johnson MD  [facility] JESUS MANUEL Grace is a 42 year old, presenting for the following health issues:  Vaginal Bleeding (Patient been having heavy bleeding since Depo shot; took 800 mg of Ibuprofen and that helped)        5/12/2023     3:58 PM   Additional Questions   Roomed by Airam   Accompanied by Self     HPI     Patient had depo shot 3/23, had small amount of normal bleeding as she expected. Then approx 4/21 had vaginal bleeding that started light, then increased in severity over three weeks of continuous bleeding, going through a pad every hour or two for multiple days at a time. Lightheaded, nauseated, feeling sick enough that she was  unable to work (5/4 left early, 5/5 did not work due to cramping  5/9 and 5/10 worked at home).    This is different from her usual pattern, which is heavy menstrual bleeding with severe cramps for two days with subsequent improvement.     She called the clinic 5/10 and this provider was notified 5/11; advised that she take 800mg of Ibuprofen up to q6h and scheduled for 5/12. She says that her bleeding improved significantly and cramping resolved after single dose. No fevers, chills, vomiting, radiating pain. Depo in March, does not suspect pregnancy.       Review of Systems   See HPI      Objective    /85 (BP Location: Left arm, Patient Position: Sitting, Cuff Size: Adult Regular)   Pulse 77   Temp 99.1  F (37.3  C) (Oral)   Resp 16   Wt 104.3 kg (230 lb)   LMP 03/07/2023   BMI 31.19 kg/m    Body mass index is 31.19 kg/m .  Physical Exam   Vital signs reviewed  Constitutional: Age appropriate human well kempt, no acute distress  HENT: Sclera non-icteric and not injected  Cardiac: Regular rate  Resp: Speaking in full sentences on room air, no respiratory distress   Skin: Warm, dry   Msk: Ambulates independently, full range of gesture  Neuro: Alert and oriented, movements coordinated and symmetric, appropriate gait  Psych: Affect appropriate to conversation and situation       Results for orders placed or performed in visit on 05/12/23   INR     Status: Normal   Result Value Ref Range    INR 0.96 0.85 - 1.15   Fibrinogen activity     Status: Normal   Result Value Ref Range    Fibrinogen Activity 358 170 - 490 mg/dL   CBC with Platelets and Reflex to Iron Studies     Status: Abnormal   Result Value Ref Range    WBC Count 8.8 4.0 - 11.0 10e3/uL    RBC Count 4.61 3.80 - 5.20 10e6/uL    Hemoglobin 11.6 (L) 11.7 - 15.7 g/dL    Hematocrit 38.9 35.0 - 47.0 %    MCV 84 78 - 100 fL    MCH 25.2 (L) 26.5 - 33.0 pg    MCHC 29.8 (L) 31.5 - 36.5 g/dL    RDW 17.2 (H) 10.0 - 15.0 %    Platelet Count 458 (H) 150 - 450  10e3/uL   Extra Green Top (Lithium Heparin) Tube     Status: None   Result Value Ref Range    Hold Specimen [facility]    Iron & Iron Binding Capacity     Status: Abnormal   Result Value Ref Range    Iron 18 (L) 37 - 145 ug/dL    Iron Binding Capacity 359 240 - 430 ug/dL    Iron Sat Index 5 (L) 15 - 46 %   Ferritin     Status: Normal   Result Value Ref Range    Ferritin 17 6 - 175 ng/mL   CBC with Platelets and Reflex to Iron Studies     Status: Abnormal    Narrative    The following orders were created for panel order CBC with Platelets and Reflex to Iron Studies.  Procedure                               Abnormality         Status                     ---------                               -----------         ------                     CBC with Platelets and R...[[id number]]  Abnormal            Final result               Extra Green Top (Lithium...[[id number]]                      Final result                 Please view results for these tests on the individual orders.

## 2023-05-17 ENCOUNTER — ANCILLARY PROCEDURE (OUTPATIENT)
Dept: ULTRASOUND IMAGING | Facility: CLINIC | Age: 43
End: 2023-05-17
Attending: FAMILY MEDICINE
Payer: COMMERCIAL

## 2023-05-17 DIAGNOSIS — N92.1 MENORRHAGIA WITH IRREGULAR CYCLE: ICD-10-CM

## 2023-05-17 PROCEDURE — 76856 US EXAM PELVIC COMPLETE: CPT

## 2023-05-18 ENCOUNTER — TELEPHONE (OUTPATIENT)
Dept: FAMILY MEDICINE | Facility: CLINIC | Age: 43
End: 2023-05-18
Payer: COMMERCIAL

## 2023-05-18 NOTE — TELEPHONE ENCOUNTER
"When opening a documentation only encounter, be sure to enter in \"Chief Complaint\" Forms and in \" Comments\" Title of form, description if needed.    Lesly is a 42 year old  female  Form received via: Patient Drop Off  Form now resides in: Provider Ready    Fely Flores RN                  "

## 2023-05-23 ENCOUNTER — INFUSION THERAPY VISIT (OUTPATIENT)
Dept: INFUSION THERAPY | Facility: CLINIC | Age: 43
End: 2023-05-23
Attending: STUDENT IN AN ORGANIZED HEALTH CARE EDUCATION/TRAINING PROGRAM
Payer: COMMERCIAL

## 2023-05-23 VITALS
TEMPERATURE: 98.5 F | DIASTOLIC BLOOD PRESSURE: 68 MMHG | OXYGEN SATURATION: 99 % | SYSTOLIC BLOOD PRESSURE: 131 MMHG | HEART RATE: 76 BPM | RESPIRATION RATE: 18 BRPM

## 2023-05-23 DIAGNOSIS — D50.8 OTHER IRON DEFICIENCY ANEMIA: ICD-10-CM

## 2023-05-23 DIAGNOSIS — N92.1 MENORRHAGIA WITH IRREGULAR CYCLE: Primary | ICD-10-CM

## 2023-05-23 PROCEDURE — 96366 THER/PROPH/DIAG IV INF ADDON: CPT

## 2023-05-23 PROCEDURE — 250N000011 HC RX IP 250 OP 636: Performed by: STUDENT IN AN ORGANIZED HEALTH CARE EDUCATION/TRAINING PROGRAM

## 2023-05-23 PROCEDURE — 258N000003 HC RX IP 258 OP 636: Performed by: STUDENT IN AN ORGANIZED HEALTH CARE EDUCATION/TRAINING PROGRAM

## 2023-05-23 PROCEDURE — 96365 THER/PROPH/DIAG IV INF INIT: CPT

## 2023-05-23 RX ORDER — METHYLPREDNISOLONE SODIUM SUCCINATE 125 MG/2ML
125 INJECTION, POWDER, LYOPHILIZED, FOR SOLUTION INTRAMUSCULAR; INTRAVENOUS
Status: CANCELLED
Start: 2023-05-25

## 2023-05-23 RX ORDER — ALBUTEROL SULFATE 0.83 MG/ML
2.5 SOLUTION RESPIRATORY (INHALATION)
Status: CANCELLED | OUTPATIENT
Start: 2023-05-25

## 2023-05-23 RX ORDER — DIPHENHYDRAMINE HYDROCHLORIDE 50 MG/ML
50 INJECTION INTRAMUSCULAR; INTRAVENOUS
Status: CANCELLED
Start: 2023-05-25

## 2023-05-23 RX ORDER — EPINEPHRINE 1 MG/ML
0.3 INJECTION, SOLUTION INTRAMUSCULAR; SUBCUTANEOUS EVERY 5 MIN PRN
Status: CANCELLED | OUTPATIENT
Start: 2023-05-25

## 2023-05-23 RX ORDER — HEPARIN SODIUM,PORCINE 10 UNIT/ML
5 VIAL (ML) INTRAVENOUS
Status: CANCELLED | OUTPATIENT
Start: 2023-05-25

## 2023-05-23 RX ORDER — HEPARIN SODIUM (PORCINE) LOCK FLUSH IV SOLN 100 UNIT/ML 100 UNIT/ML
5 SOLUTION INTRAVENOUS
Status: CANCELLED | OUTPATIENT
Start: 2023-05-25

## 2023-05-23 RX ORDER — ALBUTEROL SULFATE 90 UG/1
1-2 AEROSOL, METERED RESPIRATORY (INHALATION)
Status: CANCELLED
Start: 2023-05-25

## 2023-05-23 RX ADMIN — IRON SUCROSE 300 MG: 20 INJECTION, SOLUTION INTRAVENOUS at 14:12

## 2023-05-23 RX ADMIN — SODIUM CHLORIDE 250 ML: 9 INJECTION, SOLUTION INTRAVENOUS at 14:12

## 2023-05-23 NOTE — PROGRESS NOTES
Infusion Nursing Note:  Lesly Herrera presents today for Venofer 300 1/3.    Patient seen by provider today: No   present during visit today: Not Applicable.    Note: oriented to infusion center. Questions answered regarding iron infusion and side effects.     Intravenous Access:  Peripheral IV placed.    Treatment Conditions:  Not Applicable.      Post Infusion Assessment:  Patient tolerated infusion without incident.  Blood return noted pre and post infusion.  Site patent and intact, free from redness, edema or discomfort.  No evidence of extravasations.  Access discontinued per protocol.       Discharge Plan:   Discharge instructions reviewed with: Patient.  Patient and/or family verbalized understanding of discharge instructions and all questions answered.  Patient discharged in stable condition accompanied by: self.  Departure Mode: Ambulatory.      Radha Bee RN

## 2023-06-03 ENCOUNTER — INFUSION THERAPY VISIT (OUTPATIENT)
Dept: INFUSION THERAPY | Facility: CLINIC | Age: 43
End: 2023-06-03
Attending: STUDENT IN AN ORGANIZED HEALTH CARE EDUCATION/TRAINING PROGRAM
Payer: COMMERCIAL

## 2023-06-03 VITALS
DIASTOLIC BLOOD PRESSURE: 90 MMHG | HEART RATE: 74 BPM | TEMPERATURE: 98.3 F | RESPIRATION RATE: 16 BRPM | SYSTOLIC BLOOD PRESSURE: 133 MMHG | OXYGEN SATURATION: 99 %

## 2023-06-03 DIAGNOSIS — D50.8 OTHER IRON DEFICIENCY ANEMIA: ICD-10-CM

## 2023-06-03 DIAGNOSIS — N92.1 MENORRHAGIA WITH IRREGULAR CYCLE: Primary | ICD-10-CM

## 2023-06-03 PROCEDURE — 96365 THER/PROPH/DIAG IV INF INIT: CPT

## 2023-06-03 PROCEDURE — 258N000003 HC RX IP 258 OP 636: Performed by: STUDENT IN AN ORGANIZED HEALTH CARE EDUCATION/TRAINING PROGRAM

## 2023-06-03 PROCEDURE — 250N000011 HC RX IP 250 OP 636: Performed by: STUDENT IN AN ORGANIZED HEALTH CARE EDUCATION/TRAINING PROGRAM

## 2023-06-03 RX ORDER — HEPARIN SODIUM (PORCINE) LOCK FLUSH IV SOLN 100 UNIT/ML 100 UNIT/ML
5 SOLUTION INTRAVENOUS
OUTPATIENT
Start: 2023-06-04

## 2023-06-03 RX ORDER — HEPARIN SODIUM,PORCINE 10 UNIT/ML
5 VIAL (ML) INTRAVENOUS
OUTPATIENT
Start: 2023-06-04

## 2023-06-03 RX ORDER — METHYLPREDNISOLONE SODIUM SUCCINATE 125 MG/2ML
125 INJECTION, POWDER, LYOPHILIZED, FOR SOLUTION INTRAMUSCULAR; INTRAVENOUS
Start: 2023-06-04

## 2023-06-03 RX ORDER — ALBUTEROL SULFATE 0.83 MG/ML
2.5 SOLUTION RESPIRATORY (INHALATION)
OUTPATIENT
Start: 2023-06-04

## 2023-06-03 RX ORDER — DIPHENHYDRAMINE HYDROCHLORIDE 50 MG/ML
50 INJECTION INTRAMUSCULAR; INTRAVENOUS
Start: 2023-06-04

## 2023-06-03 RX ORDER — EPINEPHRINE 1 MG/ML
0.3 INJECTION, SOLUTION INTRAMUSCULAR; SUBCUTANEOUS EVERY 5 MIN PRN
OUTPATIENT
Start: 2023-06-04

## 2023-06-03 RX ORDER — ALBUTEROL SULFATE 90 UG/1
1-2 AEROSOL, METERED RESPIRATORY (INHALATION)
Start: 2023-06-04

## 2023-06-03 RX ADMIN — IRON SUCROSE 300 MG: 20 INJECTION, SOLUTION INTRAVENOUS at 08:22

## 2023-06-03 RX ADMIN — SODIUM CHLORIDE 250 ML: 9 INJECTION, SOLUTION INTRAVENOUS at 08:21

## 2023-06-03 ASSESSMENT — PAIN SCALES - GENERAL: PAINLEVEL: NO PAIN (0)

## 2023-06-03 NOTE — PROGRESS NOTES
Infusion Nursing Note:  Lesly Herrera presents today for Venofer 300 mg 2 of 3.    Patient seen by provider today: No   present during visit today: Not Applicable.    Note: N/A.      Intravenous Access:  Peripheral IV placed.    Treatment Conditions:  Not Applicable.      Post Infusion Assessment:  Patient tolerated infusion without incident.  Blood return noted pre and post infusion.  Site patent and intact, free from redness, edema or discomfort.  No evidence of extravasations.  Access discontinued per protocol.       Discharge Plan:   Patient declined prescription refills.  Discharge instructions reviewed with: Patient.  Patient and/or family verbalized understanding of discharge instructions and all questions answered.  AVS to patient via [employer].  Patient will return 6/10 for next appointment.   Patient discharged in stable condition accompanied by: self.  Departure Mode: Ambulatory.      Balaji Walker RN

## 2023-06-06 ENCOUNTER — OFFICE VISIT (OUTPATIENT)
Dept: OBGYN | Facility: CLINIC | Age: 43
End: 2023-06-06
Payer: COMMERCIAL

## 2023-06-06 VITALS — BODY MASS INDEX: 31.06 KG/M2 | WEIGHT: 229 LBS | DIASTOLIC BLOOD PRESSURE: 82 MMHG | SYSTOLIC BLOOD PRESSURE: 138 MMHG

## 2023-06-06 DIAGNOSIS — D25.2 INTRAMURAL, SUBMUCOUS, AND SUBSEROUS LEIOMYOMA OF UTERUS: Chronic | ICD-10-CM

## 2023-06-06 DIAGNOSIS — D25.1 INTRAMURAL, SUBMUCOUS, AND SUBSEROUS LEIOMYOMA OF UTERUS: Chronic | ICD-10-CM

## 2023-06-06 DIAGNOSIS — D25.0 INTRAMURAL, SUBMUCOUS, AND SUBSEROUS LEIOMYOMA OF UTERUS: Chronic | ICD-10-CM

## 2023-06-06 DIAGNOSIS — D50.0 IRON DEFICIENCY ANEMIA DUE TO CHRONIC BLOOD LOSS: ICD-10-CM

## 2023-06-06 DIAGNOSIS — N92.1 MENORRHAGIA WITH IRREGULAR CYCLE: Primary | ICD-10-CM

## 2023-06-06 DIAGNOSIS — Z12.4 SCREENING FOR MALIGNANT NEOPLASM OF CERVIX: ICD-10-CM

## 2023-06-06 PROBLEM — D50.9 ANEMIA, IRON DEFICIENCY: Chronic | Status: ACTIVE | Noted: 2023-05-13

## 2023-06-06 PROCEDURE — 87491 CHLMYD TRACH DNA AMP PROBE: CPT | Performed by: OBSTETRICS & GYNECOLOGY

## 2023-06-06 PROCEDURE — 87591 N.GONORRHOEAE DNA AMP PROB: CPT | Performed by: OBSTETRICS & GYNECOLOGY

## 2023-06-06 PROCEDURE — 87624 HPV HI-RISK TYP POOLED RSLT: CPT | Performed by: OBSTETRICS & GYNECOLOGY

## 2023-06-06 PROCEDURE — G0145 SCR C/V CYTO,THINLAYER,RESCR: HCPCS | Performed by: OBSTETRICS & GYNECOLOGY

## 2023-06-06 PROCEDURE — 99204 OFFICE O/P NEW MOD 45 MIN: CPT | Performed by: OBSTETRICS & GYNECOLOGY

## 2023-06-06 NOTE — NURSING NOTE
Chief Complaint   Patient presents with     Consult     Heavy periods and cramping        Initial /82 (BP Location: Right arm, Patient Position: Sitting, Cuff Size: Adult Large)   Wt 103.9 kg (229 lb)   LMP 2023 (Exact Date)   BMI 31.06 kg/m   Estimated body mass index is 31.06 kg/m  as calculated from the following:    Height as of 3/23/23: 1.829 m (6').    Weight as of this encounter: 103.9 kg (229 lb).  BP completed using cuff size: large    Questioned patient about current smoking habits.  Pt. has never smoked.          The following HM Due: NONE    Jeff Hartman, JOSÉ MIGUEL

## 2023-06-06 NOTE — PROGRESS NOTES
Assessment & Plan     Menorrhagia with irregular cycle  - Chronic, stable currently, due to submucous myomas most likely, on Depo-Provera x 3 months and about to get 2nd dose.  Need to confirm no infection.  - NEISSERIA GONORRHOEA PCR  - CHLAMYDIA TRACHOMATIS PCR  - US Pelvic Complete with Transvaginal; Future in 3-4 months to confirm stable  - If fibroids stable, then repeat U/S in 6 months after that to confirm still stable, then yearly afterwards.  - If fibroids enlarging on subsequent U/S's consider hysterectomy.  - If menometrorrhagia persists despite Depo-Provera, after 6-9 months, or anemia persists, consider hysterectomy    Intramural, submucous, and subserous leiomyoma of uterus  - Newly Dx'd based on U/S however may have been chronic for years.  - US Pelvic Complete with Transvaginal; Future in 3-4 months to confirm stable  - If fibroids stable, then repeat U/S in 6 months after that to confirm still stable, then yearly afterwards.  - If fibroids enlarging on subsequent U/S's consider hysterectomy.    Iron deficiency anemia due to chronic blood loss  - Chronic, stable  - Due to menometrorrhagia, on primary Rx including IV Fe infusions per PCP and Depo-Provera  - If anemia persists despite this due to persistent menometrorrhagia, consider hysterectomy.    Screening for malignant neoplasm of cervix  - Overdue for screening  - Pap imaged thin layer screen with HPV - recommended age 30 - 65 years (select HPV order below)  - If normal, next due 3 years.  - If abnl, then f/u per [facility].    Review of the result(s) of each unique test - Labs as noted below, Pelvic U/S as noted below         BMI:   Estimated body mass index is 31.06 kg/m  as calculated from the following:    Height as of 3/23/23: 1.829 m (6').    Weight as of this encounter: 103.9 kg (229 lb).           No follow-ups on file.    Titus Asif MD  [facility] BARBARA Grace is a 42 year old, presenting for the  following health issues:  Consult (Heavy periods and cramping )        5/12/2023     3:58 PM   Additional Questions   Roomed by Airam   Accompanied by Self     Pt here for newly Dx'd fibroids after U/S for menometrorrhagia to anemia.  Pt has always had heavy menses for many years, req super tampon and maxipad Q 60-90 min, w/ flooding and clots.  Pt had Depo-Provera started by PCP 3 months ago, and has her 2nd shot in 3 days.  In 3/2023 she had HCG Neg.  On 5/12/2023 she had Hgb 11.6, Plts 458, Fe 18, Ferritin 17, INR WNL.  Pelvic U/S 5/17/2023 showed enlarged fibroid uterus w/ at least 3 myomas, and at least 1 partially submucous, ranging from 2-4 cm, adnexa WNL.  Pt got 2 doses of IV Fe, and has 1 more dose soon.  She has no desire for future childbearing, but does not really want surgery.  She is overdue for Pap/HPV co-test.  She has no abnl discharge.  She does have bad menstrual cramps Rx'd w/ Motrin.               Review of Systems   Genitourinary: Positive for menstrual problem. Negative for vaginal bleeding.            Objective    /82 (BP Location: Right arm, Patient Position: Sitting, Cuff Size: Adult Large)   Wt 103.9 kg (229 lb)   LMP 05/26/2023 (Exact Date)   BMI 31.06 kg/m    Body mass index is 31.06 kg/m .  Physical Exam  Constitutional:       General: She is not in acute distress.     Appearance: Normal appearance. She is normal weight. She is not ill-appearing.   Neurological:      Mental Status: She is alert.     Abdomen- Abdomen soft, non-tender. BS normal. No masses, organomegaly  Pelvic- Exam chaperoned by nurse, External genitalia normal, [place]th[place]'s glands normal, [place]'s glands normal, Urethral meatus normal, Urethra normal, Bladder normal, Vagina with normal rugae, no abnormal lesions, no abnormal discharge, Normal cervix without lesions or mucopus, no cervical motion tenderness, Uterus 12 weeks size, irreg shape and contour c/w myomas, non-tender, Adnexa normal size without masses  or tenderness bilaterally, Anus normal, Pap smear was Done,  HPV Done, GC/Chlam probe was Done'    Labs 5/12/2023:  Office Visit on 05/12/2023   Component Date Value Ref Range Status     INR 05/12/2023 0.96  0.85 - 1.15 Final     Fibrinogen Activity 05/12/2023 358  170 - 490 mg/dL Final     WBC Count 05/12/2023 8.8  4.0 - 11.0 10e3/uL Final     RBC Count 05/12/2023 4.61  3.80 - 5.20 10e6/uL Final     Hemoglobin 05/12/2023 11.6 (L)  11.7 - 15.7 g/dL Final     Hematocrit 05/12/2023 38.9  35.0 - 47.0 % Final     MCV 05/12/2023 84  78 - 100 fL Final     MCH 05/12/2023 25.2 (L)  26.5 - 33.0 pg Final     MCHC 05/12/2023 29.8 (L)  31.5 - 36.5 g/dL Final     RDW 05/12/2023 17.2 (H)  10.0 - 15.0 % Final     Platelet Count 05/12/2023 458 (H)  150 - 450 10e3/uL Final     Hold Specimen 05/12/2023 JIC   Final     Iron 05/12/2023 18 (L)  37 - 145 ug/dL Final     Iron Binding Capacity 05/12/2023 359  240 - 430 ug/dL Final     Iron Sat Index 05/12/2023 5 (L)  15 - 46 % Final     Ferritin 05/12/2023 17  6 - 175 ng/mL Final       U/S 5/17/2023:  Results for orders placed or performed in visit on 05/17/23   US Pelvic Complete with Transvaginal    Narrative    EXAM: ULTRASOUND PELVIC TRANSABDOMINAL AND TRANSVAGINAL  LOCATION: [facility]  DATE/TIME: 05/17/2023, 12:55 PM CDT    INDICATION: Menorrhagia with irregular cycle.  COMPARISON: None.  TECHNIQUE: Transabdominal scans were performed. Endovaginal ultrasound was performed to better visualize the adnexa.    FINDINGS:    UTERUS: 11.6 x 6.0 x 7.1 cm. Uterine fibroids identified. Left anterior fundal 4.6 x 3.3 x 3.4 cm intramural versus submucosal fibroid. 2.1 x 1.7 x 1.7 cm right fundal intramural versus subserosal fibroid. 2.1 x 1.9 x 1.5 cm intramural posterior distal   uterine fibroid.    ENDOMETRIUM: 10 mm. Normal smooth endometrium.    RIGHT OVARY: 2.5 x 1.6 x 1.6 cm. Normal.    LEFT OVARY: 3.1 x 1.8 x 1.7 cm. Dominant follicle is 2.1 cm and appears  simple.    No significant free fluid.      Impression    IMPRESSION:  1.  Uterine fibroids.  2.  Incidental simple cyst versus dominant follicle at the left ovary consistent with a benign finding.      Premenopausal asymptomatic simple cyst:    <= 3 cm: Normal, no follow-up.    >3 to <= 5 cm: Benign finding in the physiologic size range. No follow-up is needed.    >5 to <= 7 cm: Benign simple cyst. Clinically inconsequential finding. Follow-up pelvic ultrasound in 6 months is recommended.    >7 cm: Benign simple cyst. Follow-up pelvic ultrasound in 6 months is recommended.    REFERENCE:  Management of Asymptomatic Ovarian and Other Adnexal Cysts Imaged at US: Society of Radiologists in Ultrasound Consensus Conference Statement. Radiology September 2010; [id number].    Simple Adnexal Cysts: SRU Consensus Conference Update on Follow-up and Reporting. Radiology September 2019. [phone].

## 2023-06-07 LAB
C TRACH DNA SPEC QL NAA+PROBE: NEGATIVE
N GONORRHOEA DNA SPEC QL NAA+PROBE: NEGATIVE

## 2023-06-09 ENCOUNTER — ALLIED HEALTH/NURSE VISIT (OUTPATIENT)
Dept: FAMILY MEDICINE | Facility: CLINIC | Age: 43
End: 2023-06-09
Payer: COMMERCIAL

## 2023-06-09 DIAGNOSIS — Z30.42 ENCOUNTER FOR DEPO-PROVERA CONTRACEPTION: Primary | ICD-10-CM

## 2023-06-09 PROCEDURE — 99207 PR NO CHARGE NURSE ONLY: CPT

## 2023-06-09 PROCEDURE — 96372 THER/PROPH/DIAG INJ SC/IM: CPT | Performed by: STUDENT IN AN ORGANIZED HEALTH CARE EDUCATION/TRAINING PROGRAM

## 2023-06-09 RX ADMIN — MEDROXYPROGESTERONE ACETATE 150 MG: 150 INJECTION, SUSPENSION INTRAMUSCULAR at 16:38

## 2023-06-09 NOTE — PROGRESS NOTES
Clinic Administered Medication Documentation      Depo Provera Documentation    Depo-Provera Standing Order inclusion/exclusion criteria reviewed.     Is this the initial or subsequent dose of Depo Provera? Subsequent dose - patient is within the acceptable window of time (11-15 weeks) for subsequent injection. Pregnancy test not indicated.    Patient meets: inclusion criteria     Is there an active order (written within the past 365 days, with administrations remaining, not ) in the chart? Yes.     Prior to injection, verified patient identity using patient's name and date of birth. Medication was administered. Please see MAR and medication order for additional information.     Vial/Syringe: Single dose vial. Was entire vial of medication used? Yes    Patient instructed to remain in clinic for 15 minutes and report any adverse reaction to staff immediately.  NEXT INJECTION DUE: 23 - 23    Patient has no refills remaining. n/a

## 2023-06-10 LAB
BKR LAB AP GYN ADEQUACY: NORMAL
BKR LAB AP GYN INTERPRETATION: NORMAL
BKR LAB AP HPV REFLEX: NORMAL
BKR LAB AP LMP: NORMAL
BKR LAB AP PREVIOUS ABNORMAL: NORMAL
PATH REPORT.COMMENTS IMP SPEC: NORMAL
PATH REPORT.COMMENTS IMP SPEC: NORMAL
PATH REPORT.RELEVANT HX SPEC: NORMAL

## 2023-06-12 LAB
HUMAN PAPILLOMA VIRUS 16 DNA: NEGATIVE
HUMAN PAPILLOMA VIRUS 18 DNA: NEGATIVE
HUMAN PAPILLOMA VIRUS FINAL DIAGNOSIS: NORMAL
HUMAN PAPILLOMA VIRUS OTHER HR: NEGATIVE

## 2023-06-15 ENCOUNTER — TELEPHONE (OUTPATIENT)
Dept: FAMILY MEDICINE | Facility: CLINIC | Age: 43
End: 2023-06-15
Payer: COMMERCIAL

## 2023-06-15 NOTE — TELEPHONE ENCOUNTER
Reason for call: Schedule appointment     Attempt to reach: 1st    Outcome:Left voicemail    Detailed message left? Yes    Please return call to [facility]     Clinic phone number [phone]    [facility] with patient. The patient have referral for a US Pelvic. Pls schedule off the referral when patient calls back. Thanks.

## 2023-06-16 ENCOUNTER — MYC MEDICAL ADVICE (OUTPATIENT)
Dept: OBGYN | Facility: CLINIC | Age: 43
End: 2023-06-16
Payer: COMMERCIAL

## 2023-06-26 ENCOUNTER — VIRTUAL VISIT (OUTPATIENT)
Dept: FAMILY MEDICINE | Facility: CLINIC | Age: 43
End: 2023-06-26
Payer: COMMERCIAL

## 2023-06-26 DIAGNOSIS — D25.0 INTRAMURAL, SUBMUCOUS, AND SUBSEROUS LEIOMYOMA OF UTERUS: Chronic | ICD-10-CM

## 2023-06-26 DIAGNOSIS — D25.2 INTRAMURAL, SUBMUCOUS, AND SUBSEROUS LEIOMYOMA OF UTERUS: Chronic | ICD-10-CM

## 2023-06-26 DIAGNOSIS — D50.0 IRON DEFICIENCY ANEMIA DUE TO CHRONIC BLOOD LOSS: ICD-10-CM

## 2023-06-26 DIAGNOSIS — R63.5 WEIGHT GAIN: Primary | ICD-10-CM

## 2023-06-26 DIAGNOSIS — D25.1 INTRAMURAL, SUBMUCOUS, AND SUBSEROUS LEIOMYOMA OF UTERUS: Chronic | ICD-10-CM

## 2023-06-26 PROCEDURE — 99214 OFFICE O/P EST MOD 30 MIN: CPT | Mod: VID | Performed by: STUDENT IN AN ORGANIZED HEALTH CARE EDUCATION/TRAINING PROGRAM

## 2023-06-26 ASSESSMENT — PATIENT HEALTH QUESTIONNAIRE - PHQ9: SUM OF ALL RESPONSES TO PHQ QUESTIONS 1-9: 7

## 2023-06-26 NOTE — PROGRESS NOTES
Lesly is a 43 year old who is being evaluated via a billable video visit.      How would you like to obtain your AVS? MyChart  If the video visit is dropped, the invitation should be resent by: Text to cell phone: [phone]  Will anyone else be joining your video visit? No      Assessment & Plan     Weight gain  Patient with BMI of 31, noted that she has gained significant weight since she was in her 20s and feels that this is increased over the last year.  Multiple possible contributing factors, on Depo-Provera, under significant social and medical stress (death of her mother in December, resumption of cigarette smoking, significant uterine bleeding resulting in days lost from work and potential hysterectomy in the next year).  She demonstrates multiple health promoting habits including largely balanced food intake although upon review seems heavy on carbs and proteins, with suboptimal vegetable matter.  She is exercising over 150 minutes a week therefore is meeting standards for promoting pulmonary and cardiovascular health.  Validated health promoting behaviors, discussed normal trend of weight gain and almost certainty of rebound weight gain with temporary use of a medication that may induce weight loss for few weeks.  There is no medication that we will continue to promote weight loss when it is discontinued therefore there is no medication that can be used to temporarily lose weight and then have that weight remain off after discontinuation.  We will start by having patient see a nutritionist to discuss neck steps, see if there are patterns to patient's cravings that may be managed in other ways.  Also ordering initial labs to rule out metabolic disease or conditions that may have exacerbated in the last 6 months to a year causing increased weight gain.  We will watch for these lab results.  - TSH with free T4 reflex; Future  - Hemoglobin A1c; Future  - Lipid panel reflex to direct LDL Non-fasting;  Future  - Comprehensive metabolic panel; Future  - Nutrition Referral; Future  - CBC with Platelets and Reflex to Iron Studies; Future    Iron deficiency anemia due to chronic blood loss  Leiomyoma and menorrhagia  Patient is completed course of iron infusions, as we are already collecting labs as above we will also collect hemoglobin check at 4 weeks out from last infusion next week.  At this time undergoing 6-month trial of Depo-Provera to monitor vaginal bleeding response and will then discuss with OB/GYN whether or not she wants to have a hysterectomy.  Patient is moderately anxious about this, reasonably, and has not yet decided what she will do.      No follow-ups on file.    Nelli Johnson MD  [facility] JESUS MANUEL Grace is a 43 year old, presenting for the following health issues:  RECHECK (Pt states that she is being seen today to discuss weight loss options)        6/26/2023     2:05 PM   Additional Questions   Roomed by Maegan   Accompanied by self     HPI     - Weight is uncomfortable, running SOB  - Feels like she should be smaller  - Feels like she's overeating, like she's always hungry   - is able to work out, but this is more limited   -walks for exercise, at least 45 minutes 5 days a week  - Meals: breakfast: eggs, or breakfast bars   Lunch: eating out because at work, salad from [employer] or chicken nuggets   Dinner: mashed potatoes/rice, protein (chicken, steak), not many veggies   Snacks: barely snack, usually from a vending machine- peanuts   Drinks: water bottle, coffee in the morning, sometimes green tea in afternoon  Vitamins: multivitamin, iron (just off iron infusions)                  Only takes gabapenin as needed  Smoke: 10 cigs a week (had quit and has taken it up again)  Alcohol: Weekend but not every weekend, maybe 2 a month    Has recently been undergoing iron infusions for significant uterine bleeding likely secondary to leiomyoma.   Discussing whether or not to remove uterus within the next year with OB/GYN.  Currently on trial of Depo-Provera to control bleeding.      Review of Systems   See HPI      Objective           Vitals:  No vitals were obtained today due to virtual visit.    Physical Exam     RESP: No audible wheeze, cough, or visible cyanosis.  No visible retractions or increased work of breathing.    NEURO:  Mentation and speech appropriate for age.  PSYCH: Mentation appears normal, affect normal/bright, judgement and insight intact, normal speech and appearance well-groomed.    No results found for this or any previous visit (from the past 24 hour(s)).      Video-Visit Details    Type of service:  Video Visit   Telephone Start Time: 2:12 PM  Video End Time:14:35    Originating Location (pt. Location): Home   distant Location (provider location):  On-site  Platform used for Video Visit: Telephone

## 2023-06-26 NOTE — PATIENT INSTRUCTIONS
Thank you for coming in today,    Once again I would like to reinforce that you are already doing a lot of the healthy things that are proven to reduce heart and lung disease and promote overall health.  You are exercising more than 150 minutes a week, you are monitoring your diet and being very deliberate about what you eat (although there may be some tweaking here which is why we referred you to nutrition).  Overall it would be best if you quit smoking when you are ready, but I know things have been very stressful for you this year.  Please just let us know if you want our assistance in this.    As we discussed there is no medication that will induce temporary weight loss that will not then cause your body to gain the weight back immediately upon discontinuation.  This is not a matter of willpower, or someone not holding to a diet or lifestyle, this is a normal protective response when your metabolism is put under artificial chemical stress.  It rebounds and tries to chucky resources.  Since it sounds like you do not want to be on a medication long-term, many of the weight loss drugs would not be a good fit for you.  If you feel there is a compulsive piece to your eating, after we have ruled out a metabolic syndrome you can discuss with your healthcare provider trying something like a bupropion and cognitive behavioral therapy if those were of interest you.    Nelli Johnson MD

## 2023-06-26 NOTE — PROGRESS NOTES
Preceptor Attestation:   I discussed the patient with the resident. I have verified the content of the note, which accurately reflects my assessment of the patient and the plan of care.   Supervising Physician:  Vipul Mcdaniels MD.

## 2023-08-07 ENCOUNTER — TELEPHONE (OUTPATIENT)
Dept: OBGYN | Facility: CLINIC | Age: 43
End: 2023-08-07
Payer: COMMERCIAL

## 2023-08-07 NOTE — TELEPHONE ENCOUNTER
Patient Name: Lesly Herrera   MRN: [id number]   Case#: [id number]   Surgeon(s) and Role:      * Titus Asif MD - Primary   Date requested: * No dates entered *   Location: [facility] OR   Procedure(s):   HYSTERECTOMY, TOTAL, ROBOT-ASSISTED, USING DA CANDY XI, WITH BILATERAL SALPINGECTOMY

## 2023-08-07 NOTE — TELEPHONE ENCOUNTER
Type of surgery: robotic assisted total laparoscopic hysterectomy, bilateral salpingectomy  Location of surgery: [facility]  Date and time of surgery: 10/10/23 @ 10:45 am  Surgeon: Dr. Asif  Pre-Op Appt Date: Patient advised to schedule.  Post-Op Appt Date: 11/21/23   Packet sent out: Yes  Pre-cert/Authorization completed:  No  Date: 8/7/23

## 2023-08-25 ENCOUNTER — ALLIED HEALTH/NURSE VISIT (OUTPATIENT)
Dept: FAMILY MEDICINE | Facility: CLINIC | Age: 43
End: 2023-08-25
Payer: COMMERCIAL

## 2023-08-25 DIAGNOSIS — Z23 ENCOUNTER FOR IMMUNIZATION: Primary | ICD-10-CM

## 2023-08-25 PROCEDURE — 99207 PR NO CHARGE NURSE ONLY: CPT

## 2023-08-25 PROCEDURE — 96372 THER/PROPH/DIAG INJ SC/IM: CPT | Performed by: STUDENT IN AN ORGANIZED HEALTH CARE EDUCATION/TRAINING PROGRAM

## 2023-08-25 RX ADMIN — MEDROXYPROGESTERONE ACETATE 150 MG: 150 INJECTION, SUSPENSION INTRAMUSCULAR at 13:34

## 2023-08-25 NOTE — PROGRESS NOTES
Clinic Administered Medication Documentation      Depo Provera Documentation    Depo-Provera Standing Order inclusion/exclusion criteria reviewed.     Is this the initial or subsequent dose of Depo Provera? Subsequent dose - patient is within the acceptable window of time (11-15 weeks) for subsequent injection. Pregnancy test not indicated.    Patient meets: inclusion criteria     Is there an active order (written within the past 365 days, with administrations remaining, not ) in the chart? Yes.     Prior to injection, verified patient identity using patient's name and date of birth. Medication was administered. Please see MAR and medication order for additional information.     Vial/Syringe: Single dose vial. Was entire vial of medication used? Yes    Patient instructed to remain in clinic for 15 minutes and report any adverse reaction to staff immediately.  NEXT INJECTION DUE: 11/10/23 - 23    Verified that the patient has refills remaining in their prescription.

## 2023-09-22 ENCOUNTER — OFFICE VISIT (OUTPATIENT)
Dept: FAMILY MEDICINE | Facility: CLINIC | Age: 43
End: 2023-09-22
Payer: COMMERCIAL

## 2023-09-22 VITALS
DIASTOLIC BLOOD PRESSURE: 91 MMHG | BODY MASS INDEX: 31.11 KG/M2 | HEART RATE: 89 BPM | HEIGHT: 72 IN | RESPIRATION RATE: 18 BRPM | OXYGEN SATURATION: 99 % | WEIGHT: 229.7 LBS | SYSTOLIC BLOOD PRESSURE: 139 MMHG

## 2023-09-22 DIAGNOSIS — Z02.89 ENCOUNTER FOR COMPLETION OF FORM WITH PATIENT: ICD-10-CM

## 2023-09-22 DIAGNOSIS — R03.0 ELEVATED BP WITHOUT DIAGNOSIS OF HYPERTENSION: ICD-10-CM

## 2023-09-22 DIAGNOSIS — R63.5 WEIGHT GAIN: ICD-10-CM

## 2023-09-22 DIAGNOSIS — R06.83 SNORING: ICD-10-CM

## 2023-09-22 DIAGNOSIS — Z77.21 EXPOSURE TO POTENTIALLY HAZARDOUS BODY FLUIDS: ICD-10-CM

## 2023-09-22 DIAGNOSIS — R40.0 DAYTIME SLEEPINESS: ICD-10-CM

## 2023-09-22 DIAGNOSIS — Z11.3 ROUTINE SCREENING FOR STI (SEXUALLY TRANSMITTED INFECTION): ICD-10-CM

## 2023-09-22 DIAGNOSIS — Z23 ENCOUNTER FOR IMMUNIZATION: ICD-10-CM

## 2023-09-22 DIAGNOSIS — Z01.818 PREOP GENERAL PHYSICAL EXAM: Primary | ICD-10-CM

## 2023-09-22 LAB
ALBUMIN SERPL BCG-MCNC: 4.4 G/DL (ref 3.5–5.2)
ALP SERPL-CCNC: 87 U/L (ref 35–104)
ALT SERPL W P-5'-P-CCNC: 33 U/L (ref 0–50)
ANION GAP SERPL CALCULATED.3IONS-SCNC: 11 MMOL/L (ref 7–15)
AST SERPL W P-5'-P-CCNC: 24 U/L (ref 0–45)
BILIRUB SERPL-MCNC: 0.2 MG/DL
BUN SERPL-MCNC: 8.4 MG/DL (ref 6–20)
CALCIUM SERPL-MCNC: 9.1 MG/DL (ref 8.6–10)
CHLORIDE SERPL-SCNC: 105 MMOL/L (ref 98–107)
CREAT SERPL-MCNC: 0.78 MG/DL (ref 0.51–0.95)
DEPRECATED HCO3 PLAS-SCNC: 22 MMOL/L (ref 22–29)
EGFRCR SERPLBLD CKD-EPI 2021: >90 ML/MIN/1.73M2
ERYTHROCYTE [DISTWIDTH] IN BLOOD BY AUTOMATED COUNT: 13.3 % (ref 10–15)
GLUCOSE SERPL-MCNC: 118 MG/DL (ref 70–99)
HBA1C MFR BLD: 5.7 % (ref 0–5.6)
HCT VFR BLD AUTO: 40.4 % (ref 35–47)
HGB BLD-MCNC: 13.2 G/DL (ref 11.7–15.7)
HOLD SPECIMEN: NORMAL
MCH RBC QN AUTO: 28.9 PG (ref 26.5–33)
MCHC RBC AUTO-ENTMCNC: 32.7 G/DL (ref 31.5–36.5)
MCV RBC AUTO: 89 FL (ref 78–100)
PLATELET # BLD AUTO: 381 10E3/UL (ref 150–450)
POTASSIUM SERPL-SCNC: 4.3 MMOL/L (ref 3.4–5.3)
PROT SERPL-MCNC: 7.4 G/DL (ref 6.4–8.3)
RBC # BLD AUTO: 4.56 10E6/UL (ref 3.8–5.2)
SODIUM SERPL-SCNC: 138 MMOL/L (ref 136–145)
TSH SERPL DL<=0.005 MIU/L-ACNC: 1.5 UIU/ML (ref 0.3–4.2)
WBC # BLD AUTO: 6.1 10E3/UL (ref 4–11)

## 2023-09-22 PROCEDURE — 36415 COLL VENOUS BLD VENIPUNCTURE: CPT | Performed by: STUDENT IN AN ORGANIZED HEALTH CARE EDUCATION/TRAINING PROGRAM

## 2023-09-22 PROCEDURE — 90746 HEPB VACCINE 3 DOSE ADULT IM: CPT | Performed by: STUDENT IN AN ORGANIZED HEALTH CARE EDUCATION/TRAINING PROGRAM

## 2023-09-22 PROCEDURE — 87340 HEPATITIS B SURFACE AG IA: CPT | Performed by: STUDENT IN AN ORGANIZED HEALTH CARE EDUCATION/TRAINING PROGRAM

## 2023-09-22 PROCEDURE — 87389 HIV-1 AG W/HIV-1&-2 AB AG IA: CPT | Performed by: STUDENT IN AN ORGANIZED HEALTH CARE EDUCATION/TRAINING PROGRAM

## 2023-09-22 PROCEDURE — 86803 HEPATITIS C AB TEST: CPT | Performed by: STUDENT IN AN ORGANIZED HEALTH CARE EDUCATION/TRAINING PROGRAM

## 2023-09-22 PROCEDURE — 99214 OFFICE O/P EST MOD 30 MIN: CPT | Mod: 25 | Performed by: STUDENT IN AN ORGANIZED HEALTH CARE EDUCATION/TRAINING PROGRAM

## 2023-09-22 PROCEDURE — 80053 COMPREHEN METABOLIC PANEL: CPT | Performed by: STUDENT IN AN ORGANIZED HEALTH CARE EDUCATION/TRAINING PROGRAM

## 2023-09-22 PROCEDURE — 86706 HEP B SURFACE ANTIBODY: CPT | Performed by: STUDENT IN AN ORGANIZED HEALTH CARE EDUCATION/TRAINING PROGRAM

## 2023-09-22 PROCEDURE — 90471 IMMUNIZATION ADMIN: CPT | Performed by: STUDENT IN AN ORGANIZED HEALTH CARE EDUCATION/TRAINING PROGRAM

## 2023-09-22 PROCEDURE — 87591 N.GONORRHOEAE DNA AMP PROB: CPT | Performed by: STUDENT IN AN ORGANIZED HEALTH CARE EDUCATION/TRAINING PROGRAM

## 2023-09-22 PROCEDURE — 86780 TREPONEMA PALLIDUM: CPT | Performed by: STUDENT IN AN ORGANIZED HEALTH CARE EDUCATION/TRAINING PROGRAM

## 2023-09-22 PROCEDURE — 85027 COMPLETE CBC AUTOMATED: CPT | Performed by: STUDENT IN AN ORGANIZED HEALTH CARE EDUCATION/TRAINING PROGRAM

## 2023-09-22 PROCEDURE — 87491 CHLMYD TRACH DNA AMP PROBE: CPT | Performed by: STUDENT IN AN ORGANIZED HEALTH CARE EDUCATION/TRAINING PROGRAM

## 2023-09-22 PROCEDURE — 83036 HEMOGLOBIN GLYCOSYLATED A1C: CPT | Performed by: STUDENT IN AN ORGANIZED HEALTH CARE EDUCATION/TRAINING PROGRAM

## 2023-09-22 PROCEDURE — 84443 ASSAY THYROID STIM HORMONE: CPT | Performed by: STUDENT IN AN ORGANIZED HEALTH CARE EDUCATION/TRAINING PROGRAM

## 2023-09-22 ASSESSMENT — ENCOUNTER SYMPTOMS
ABDOMINAL PAIN: 1
RESPIRATORY NEGATIVE: 1
MYALGIAS: 0
FEVER: 0
VOMITING: 0
CONSTIPATION: 0
CARDIOVASCULAR NEGATIVE: 1
UNEXPECTED WEIGHT CHANGE: 0
NECK PAIN: 1
FATIGUE: 1
JOINT SWELLING: 0
NAUSEA: 0
ACTIVITY CHANGE: 0
EYES NEGATIVE: 1
DIARRHEA: 1
HEMATOCHEZIA: 0

## 2023-09-22 NOTE — LETTER
September 25, 2023    [facility]      To Whom It May Concern:    Regarding Ms. Lesly Herrera: she is a patient of mine, with last visit 9/22/2023. This letter is to request Ms. Herrera be provided with a sit-stand desk at work. She suffers from multiple conditions that result in increase pain with sitting all day.      If you have any questions, please do not hesitate to contact me, or our Team Nurse, at clinic.     I thank you in advance for your help.         Respectfully,       Cinthia Garza MD

## 2023-09-22 NOTE — PROGRESS NOTES
[address] [address]  Phone: [phone]  Fax: [phone]  Primary Provider: Cinthia Chu  Pre-op Performing Provider: CINTHIA CHU        PREOPERATIVE EVALUATION:  Today's date: 9/22/2023    Lesly is a 43 year old female who presents for a preoperative evaluation.      9/22/2023     4:07 PM   Additional Questions   Roomed by Harris   Accompanied by Self       Surgical Information:  Surgery/Procedure: Laparoscopic Hysterectomy (robot assist)  Surgery Location:  OR  Surgeon: Titus Asif MD  Surgery Date: 10/10/2023  Time of Surgery: 10:45AM  Where patient plans to recover: At home with family and At home alone  Fax number for surgical facility: Note does not need to be faxed, will be available electronically in Epic.    Assessment & Plan     The proposed surgical procedure is considered INTERMEDIATE risk.    Preop general physical exam  44 y/o w/ hx of chronic int abdominal pain, vaginal bleeding and anemia from fibroids presents for pre-op prior to robot-assisted laparoscopic hysterecomy w/ Dr. Asif on 10/10/2023. She reports ongoing symptoms today. Hgb checked and was normal at 13.2. Suspected sleep apnea, HTN, and prediabetes will be addressed postoperatively. No barriers to proceeding with surgery; anticipate symptoms, stress will greatly improved afterwards.    Encounter for immunization  - HEPATITIS B VACCINE,ADULT,IM    Routine screening for STI (sexually transmitted infection)  Exposure to potentially hazardous body fluids  Desired screening today after learning of infidelity of long term male partner earlier this month. No STI symptoms.  ADDENDUM: STI screening negative  - NEISSERIA GONORRHOEA PCR; Future  - CHLAMYDIA TRACHOMATIS PCR; Future  - Treponema Abs w Reflex to RPR and Titer; Future  - HIV Antigen Antibody Combo; Future  - Hepatitis C Screen Reflex to HCV RNA Quant and Genotype; Future  - Hepatitis B surface  antigen; Future  - Hepatitis B Surface Antibody; Future    Encounter for completion of form with patient  Needs workability and FMLA forms completed related to above issue.    Elevated BP without diagnosis of hypertension  Bps typically in the 130s systolic; higher today due to her stress, per her report. Initial was 153/96, came down to 139/91. Monitor after surgery, likely HTN dx though context of prior BP not known to writer.     Weight gain  Orders released from prior visit.   - TSH with free T4 reflex  - Hemoglobin A1c  - Comprehensive metabolic panel  - CBC with Platelets and Reflex to Iron Studies    Daytime sleepiness  Snoring  Possible Sleep Apnea: hx of snoring, observed apnea, high BP. Sleep med referral placed again as she has not been able to get this done yet.   No impedence to proceeding with surgery.           9/26/2023     8:47 PM   STOP-Bang Total Score   Total Score 4   Risk Stratification 3 - 4: Moderate Risk for JUAN PABLO   - Adult Sleep Eval & Management Referral; Future        - No identified additional risk factors other than previously addressed    Antiplatelet or Anticoagulation Medication Instructions:   - Patient is on no antiplatelet or anticoagulation medications.    Additional Medication Instructions:  Patient is to take all scheduled medications on the day of surgery    RECOMMENDATION:  APPROVAL GIVEN to proceed with proposed procedure, without further diagnostic evaluation.      Subjective     HPI related to upcoming procedure:     Feeling really fatigued  Abdominal pain  Ongoing bleeding - daily, heavy  All symptoms pretty stable  No fevers, no cold symptoms.  Sometimes has night sweats, for the last couple months  Not sure when mom went through menopause        9/22/2023     4:11 PM   Preop Questions   1. Have you ever had a heart attack or stroke? No   2. Have you ever had surgery on your heart or blood vessels, such as a stent placement, a coronary artery bypass, or surgery on an  artery in your head, neck, heart, or legs? No   3. Do you have chest pain with activity? No   4. Do you have a history of  heart failure? No   5. Do you currently have a cold, bronchitis or symptoms of other infection? No   6. Do you have a cough, shortness of breath, or wheezing? No   7. Do you or anyone in your family have previous history of blood clots? No   8. Do you or does anyone in your family have a serious bleeding problem such as prolonged bleeding following surgeries or cuts? No   9. Have you ever had problems with anemia or been told to take iron pills? YES - had anemia from heavy bleeding. Had iron infusions.   10. Have you had any abnormal blood loss such as black, tarry or bloody stools, or abnormal vaginal bleeding? YES - see above.   11. Have you ever had a blood transfusion? No   12. Are you willing to have a blood transfusion if it is medically needed before, during, or after your surgery? Yes   13. Have you or any of your relatives ever had problems with anesthesia? No   14. Do you have sleep apnea, excessive snoring or daytime drowsiness? YES - snores loud at night. Has been told by sleeping partner in the past of ? Apneic episodes. Has been recommended for sleep study before.   14a. Do you have a CPAP machine? No   15. Do you have any artifical heart valves or other implanted medical devices like a pacemaker, defibrillator, or continuous glucose monitor? No   16. Do you have artificial joints? No   17. Are you allergic to latex? No   18. Is there any chance that you may be pregnant? No     Health Care Directive:  Patient does not have a Health Care Directive or Living Will: Not discussed    Preoperative Review of :   reviewed - controlled substances reflected in medication list.    Chronic problems related to primary issue for surgery    Former smoker      Review of Systems   Constitutional:  Positive for fatigue. Negative for activity change, fever and unexpected weight change.   HENT:  Negative.     Eyes: Negative.    Respiratory: Negative.     Cardiovascular: Negative.    Gastrointestinal:  Positive for abdominal pain (lower) and diarrhea (occasional). Negative for constipation, hematochezia, nausea and vomiting.   Breasts:  negative.    Genitourinary: Negative.    Musculoskeletal:  Positive for neck pain (associated with sitting all day and increased stress). Negative for joint swelling and myalgias.   Skin: Negative.        Patient Active Problem List    Diagnosis Date Noted    Elevated BP without diagnosis of hypertension 09/22/2023     Priority: Medium    Snoring 09/22/2023     Priority: Medium    Daytime sleepiness 09/22/2023     Priority: Medium    Intramural, submucous, and subserous leiomyoma of uterus 06/06/2023     Priority: Medium    Menorrhagia with irregular cycle 05/13/2023     Priority: Medium    Iron deficiency anemia due to chronic blood loss 05/13/2023     Priority: Medium    Abdominal pain, generalized 08/11/2020     Priority: Medium     LLQ and RLQ      BMI 31.0-31.9,adult 09/27/2019     Priority: Medium    Left lower quadrant pain 09/27/2019     Priority: Medium    Obesity 05/13/2014     Priority: Medium    Anxiety 05/13/2014     Priority: Medium    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  Current Outpatient Medications   Medication Sig Dispense Refill    ferrous sulfate (FEROSUL) 325 (65 Fe) MG tablet Take 325 mg by mouth daily (with breakfast)      gabapentin (NEURONTIN) 100 MG capsule Take 1 capsule (100 mg) by mouth 3 times daily as needed (anxiety) 90 capsule 1    Multiple Vitamins-Minerals (MULTIVITAMIN ADULT PO)       Omega-3 Fatty Acids (OMEGA-3 FISH OIL PO)          No Known Allergies     Social History     Tobacco Use    Smoking status:  Some Days     Packs/day: 0.05     Types: Cigarettes     Last attempt to quit: 6/3/2019     Years since quittin.3     Passive exposure: Current    Smokeless tobacco: Never   Substance Use Topics    Alcohol use: Yes     History   Drug Use No         Objective     BP (!) 139/91   Pulse 89   Resp 18   Ht 1.829 m (6')   Wt 104.2 kg (229 lb 11.2 oz)   SpO2 99%   Breastfeeding No   BMI 31.15 kg/m      Physical Exam  GENERAL: Healthy, alert and no distress  EYES: Eyes grossly normal to inspection.  No discharge or erythema, or obvious scleral/conjunctival abnormalities.  CV: RRR, no murmur  RESP: CTAB. No audible wheeze, cough, or visible cyanosis.  No visible retractions or increased work of breathing.    SKIN: Warm, dry No significant rash, abnormal pigmentation or lesions.  NEURO: Cranial nerves grossly intact.  Mentation and speech appropriate for age. No focal deficits.  PSYCH: Mentation appears normal, affect normal/bright, judgement and insight intact, normal speech and appearance well-groomed.      Recent Labs   Lab Test 23  1629   HGB 11.6*   *   INR 0.96        Diagnostics:  Recent Results (from the past 168 hour(s))   TSH with free T4 reflex    Collection Time: 23  5:02 PM   Result Value Ref Range    TSH 1.50 0.30 - 4.20 uIU/mL   Hemoglobin A1c    Collection Time: 23  5:02 PM   Result Value Ref Range    Hemoglobin A1C 5.7 (H) 0.0 - 5.6 %   Comprehensive metabolic panel    Collection Time: 23  5:02 PM   Result Value Ref Range    Sodium 138 136 - 145 mmol/L    Potassium 4.3 3.4 - 5.3 mmol/L    Chloride 105 98 - 107 mmol/L    Carbon Dioxide (CO2) 22 22 - 29 mmol/L    Anion Gap 11 7 - 15 mmol/L    Urea Nitrogen 8.4 6.0 - 20.0 mg/dL    Creatinine 0.78 0.51 - 0.95 mg/dL    Calcium 9.1 8.6 - 10.0 mg/dL    Glucose 118 (H) 70 - 99 mg/dL    Alkaline Phosphatase 87 35 - 104 U/L    AST 24 0 - 45 U/L    ALT 33 0 - 50 U/L    Protein Total 7.4 6.4 - 8.3 g/dL    Albumin 4.4 3.5 - 5.2  g/dL    Bilirubin Total 0.2 <=1.2 mg/dL    GFR Estimate >90 >60 mL/min/1.73m2   NEISSERIA GONORRHOEA PCR    Collection Time: 09/22/23  5:02 PM    Specimen: Urine, Voided   Result Value Ref Range    Neisseria gonorrhoeae Negative Negative   CHLAMYDIA TRACHOMATIS PCR    Collection Time: 09/22/23  5:02 PM    Specimen: Urine, Voided   Result Value Ref Range    Chlamydia trachomatis Negative Negative   Treponema Abs w Reflex to RPR and Titer    Collection Time: 09/22/23  5:02 PM   Result Value Ref Range    Treponema Antibody Total Nonreactive Nonreactive   HIV Antigen Antibody Combo    Collection Time: 09/22/23  5:02 PM   Result Value Ref Range    HIV Antigen Antibody Combo Nonreactive Nonreactive   Hepatitis C Screen Reflex to HCV RNA Quant and Genotype    Collection Time: 09/22/23  5:02 PM   Result Value Ref Range    Hepatitis C Antibody Nonreactive Nonreactive   Hepatitis B surface antigen    Collection Time: 09/22/23  5:02 PM   Result Value Ref Range    Hepatitis B Surface Antigen Nonreactive Nonreactive   Hepatitis B Surface Antibody    Collection Time: 09/22/23  5:02 PM   Result Value Ref Range    Hepatitis B Surface Antibody Instrument Value 7.52 <8.00 m[IU]/mL    Hepatitis B Surface Antibody Nonreactive    CBC with Platelets and Reflex to Iron Studies    Collection Time: 09/22/23  5:02 PM   Result Value Ref Range    WBC Count 6.1 4.0 - 11.0 10e3/uL    RBC Count 4.56 3.80 - 5.20 10e6/uL    Hemoglobin 13.2 11.7 - 15.7 g/dL    Hematocrit 40.4 35.0 - 47.0 %    MCV 89 78 - 100 fL    MCH 28.9 26.5 - 33.0 pg    MCHC 32.7 31.5 - 36.5 g/dL    RDW 13.3 10.0 - 15.0 %    Platelet Count 381 150 - 450 10e3/uL   Extra Green Top (Lithium Heparin) Tube    Collection Time: 09/22/23  5:02 PM   Result Value Ref Range    Hold Specimen JIC       No EKG required, no history of coronary heart disease, significant arrhythmia, peripheral arterial disease or other structural heart disease.    Revised Cardiac Risk Index (RCRI):  The patient  has the following serious cardiovascular risks for perioperative complications:   - No serious cardiac risks = 0 points     RCRI Interpretation: 0 points: Class I (very low risk - 0.4% complication rate)         Signed Electronically by: Cinthia Garza MD  Copy of this evaluation report is provided to requesting physician.

## 2023-09-22 NOTE — PATIENT INSTRUCTIONS
Recovery after vaginal hysterectomy is shorter and less painful than it is after an abdominal hysterectomy. A full recovery might take three to four weeks. Even if you feel recovered, don't lift anything heavy -- more than 20 pounds (9.1 kilograms) -- or have vaginal intercourse until six weeks after surgery.  Preparing for Your Surgery  Getting started  A nurse will call you to review your health history and instructions. They will give you an arrival time based on your scheduled surgery time. Please be ready to share:  Your doctor's clinic name and phone number  Your medical, surgical, and anesthesia history  A list of allergies and sensitivities  A list of medicines, including herbal treatments and over-the-counter drugs  Whether the patient has a legal guardian (ask how to send us the papers in advance)  Please tell us if you're pregnant--or if there's any chance you might be pregnant. Some surgeries may injure a fetus (unborn baby), so they require a pregnancy test. Surgeries that are safe for a fetus don't always need a test, and you can choose whether to have one.   If you have a child who's having surgery, please ask for a copy of Preparing for Your Child's Surgery.    Preparing for surgery  Within 10 to 30 days of surgery: Have a pre-op exam (sometimes called an H&P, or History and Physical). This can be done at a clinic or pre-operative center.  If you're having a , you may not need this exam. Talk to your care team.  At your pre-op exam, talk to your care team about all medicines you take. If you need to stop any medicines before surgery, ask when to start taking them again.  We do this for your safety. Many medicines can make you bleed too much during surgery. Some change how well surgery (anesthesia) drugs work.  Call your insurance company to let them know you're having surgery. (If you don't have insurance, call [phone].)  Call your clinic if there's any change in your health. This  includes signs of a cold or flu (sore throat, runny nose, cough, rash, fever). It also includes a scrape or scratch near the surgery site.  If you have questions on the day of surgery, call your hospital or surgery center.  Eating and drinking guidelines  For your safety: Unless your surgeon tells you otherwise, follow the guidelines below.  Eat and drink as usual until 8 hours before you arrive for surgery. After that, no food or milk.  Drink clear liquids until 2 hours before you arrive. These are liquids you can see through, like water, Gatorade, and Propel Water. They also include plain black coffee and tea (no cream or milk), candy, and breath mints. You can spit out gum when you arrive.  If you drink alcohol: Stop drinking it the night before surgery.  If your care team tells you to take medicine on the morning of surgery, it's okay to take it with a sip of water.  Preventing infection  Shower or bathe the night before and morning of your surgery. Follow the instructions your clinic gave you. (If no instructions, use regular soap.)  Don't shave or clip hair near your surgery site. We'll remove the hair if needed.  Don't smoke or vape the morning of surgery. You may chew nicotine gum up to 2 hours before surgery. A nicotine patch is okay.  Note: Some surgeries require you to completely quit smoking and nicotine. Check with your surgeon.  Your care team will make every effort to keep you safe from infection. We will:  Clean our hands often with soap and water (or an alcohol-based hand rub).  Clean the skin at your surgery site with a special soap that kills germs.  Give you a special gown to keep you warm. (Cold raises the risk of infection.)  Wear special hair covers, masks, gowns and gloves during surgery.  Give antibiotic medicine, if prescribed. Not all surgeries need antibiotics.  What to bring on the day of surgery  Photo ID and insurance card  Copy of your health care directive, if you have one  Glasses  and hearing aids (bring cases)  You can't wear contacts during surgery  Inhaler and eye drops, if you use them (tell us about these when you arrive)  CPAP machine or breathing device, if you use them  A few personal items, if spending the night  If you have . . .  A pacemaker, ICD (cardiac defibrillator) or other implant: Bring the ID card.  An implanted stimulator: Bring the remote control.  A legal guardian: Bring a copy of the certified (court-stamped) guardianship papers.  Please remove any jewelry, including body piercings. Leave jewelry and other valuables at home.  If you're going home the day of surgery  You must have a responsible adult drive you home. They should stay with you overnight as well.  If you don't have someone to stay with you, and you aren't safe to go home alone, we may keep you overnight. Insurance often won't pay for this.  After surgery  If it's hard to control your pain or you need more pain medicine, please call your surgeon's office.  Questions?   If you have any questions for your care team, list them here: _________________________________________________________________________________________________________________________________________________________________________ ____________________________________ ____________________________________ ____________________________________  For informational purposes only. Not to replace the advice of your health care provider. Copyright   2003, 2019 [employer] [facility]. All rights reserved. Clinically reviewed by Hillary Gutierrez MD. [employer] [id number] - REV 12/22.    How to Take Your Medication Before Surgery  - Take all of your medications before surgery as usual  - Do not take ibuprofen or other NSAID medication within 2 days of surgery

## 2023-09-22 NOTE — PROGRESS NOTES
Preceptor Attestation:   Patient seen, evaluated and discussed with the resident. I have verified the content of the note, which accurately reflects my assessment of the patient and the plan of care.   Supervising Physician:  Remi Hernandez MD

## 2023-09-23 LAB
C TRACH DNA SPEC QL NAA+PROBE: NEGATIVE
N GONORRHOEA DNA SPEC QL NAA+PROBE: NEGATIVE
T PALLIDUM AB SER QL: NONREACTIVE

## 2023-09-24 LAB
HBV SURFACE AB SERPL IA-ACNC: 7.52 M[IU]/ML
HBV SURFACE AB SERPL IA-ACNC: NONREACTIVE M[IU]/ML
HBV SURFACE AG SERPL QL IA: NONREACTIVE
HCV AB SERPL QL IA: NONREACTIVE
HIV 1+2 AB+HIV1 P24 AG SERPL QL IA: NONREACTIVE

## 2023-09-26 NOTE — PATIENT INSTRUCTIONS
[phone] weight loss lifestyle number     Here is the plan from today's visit    1. Encounter for routine history and physical exam in female  Will mychart or call with results.   - Pap imaged thin layer screen with HPV - recommended age 30 - 65 years (select HPV order below)    2. Obesity, Class I, BMI 30-34.9  - WEIGHT MANANGEMENT/P LIFESTYLE PROGRAM REFERRAL - INTERNAL  - SLEEP EVALUATION & MANAGEMENT REFERRAL - [facility]  [phone] (Age 2 and up); Future    Please call or return to clinic if your symptoms don't go away.    Thank you for coming to [facility] today.  Lab Testing:  **If you had lab testing today and your results are reassuring or normal they will be mailed to you or sent through [employer] within 7 days.   **If the lab tests need quick action we will call you with the results.  The phone number we will call with results is # [phone] (home) . If this is not the best number please call our clinic and change the number.  Medication Refills:  If you need any refills please call your pharmacy and they will contact us.   If you need to  your refill at a new pharmacy, please contact the new pharmacy directly. The new pharmacy will help you get your medications transferred faster.   Scheduling:  If you have any concerns about today's visit or wish to schedule another appointment please call our office during normal business hours [phone] (8-5:00 M-F)  If a referral was made to a [facility] and you don't get a call from central scheduling please call [phone].  If a Mammogram was ordered for you at [facility] call [phone] to schedule or change your appointment.  If you had an XRay/CT/Ultrasound/MRI ordered the number is [phone] to schedule or change your radiology appointment.   Medical Concerns:  If you have urgent medical concerns please call [phone] at any time of the  How Severe Are Your Spot(S)?: mild What Type Of Note Output Would You Prefer (Optional)?: Standard Output darcie.    Braden Acharya MD     What Is The Reason For Today's Visit?: Full Body Skin Examination What Is The Reason For Today's Visit? (Being Monitored For X): concerning skin lesions on an annual basis

## 2023-10-03 ENCOUNTER — TELEPHONE (OUTPATIENT)
Dept: OBGYN | Facility: CLINIC | Age: 43
End: 2023-10-03

## 2023-10-03 ENCOUNTER — OFFICE VISIT (OUTPATIENT)
Dept: OBGYN | Facility: CLINIC | Age: 43
End: 2023-10-03
Payer: COMMERCIAL

## 2023-10-03 VITALS
HEIGHT: 72 IN | BODY MASS INDEX: 30.75 KG/M2 | DIASTOLIC BLOOD PRESSURE: 80 MMHG | SYSTOLIC BLOOD PRESSURE: 134 MMHG | WEIGHT: 227 LBS

## 2023-10-03 DIAGNOSIS — K59.09 OTHER CONSTIPATION: ICD-10-CM

## 2023-10-03 DIAGNOSIS — N92.1 MENORRHAGIA WITH IRREGULAR CYCLE: Primary | Chronic | ICD-10-CM

## 2023-10-03 DIAGNOSIS — D25.2 INTRAMURAL, SUBMUCOUS, AND SUBSEROUS LEIOMYOMA OF UTERUS: Chronic | ICD-10-CM

## 2023-10-03 DIAGNOSIS — Z79.2 PROPHYLACTIC ANTIBIOTIC: ICD-10-CM

## 2023-10-03 DIAGNOSIS — D25.0 INTRAMURAL, SUBMUCOUS, AND SUBSEROUS LEIOMYOMA OF UTERUS: Chronic | ICD-10-CM

## 2023-10-03 DIAGNOSIS — D25.1 INTRAMURAL, SUBMUCOUS, AND SUBSEROUS LEIOMYOMA OF UTERUS: Chronic | ICD-10-CM

## 2023-10-03 DIAGNOSIS — D50.0 IRON DEFICIENCY ANEMIA DUE TO CHRONIC BLOOD LOSS: ICD-10-CM

## 2023-10-03 PROCEDURE — 99213 OFFICE O/P EST LOW 20 MIN: CPT | Performed by: OBSTETRICS & GYNECOLOGY

## 2023-10-03 RX ORDER — BISACODYL 5 MG/1
TABLET, DELAYED RELEASE ORAL
Qty: 4 TABLET | Refills: 0 | Status: SHIPPED | OUTPATIENT
Start: 2023-10-03 | End: 2023-11-21

## 2023-10-03 RX ORDER — CLINDAMYCIN PHOSPHATE 20 MG/G
1 CREAM VAGINAL AT BEDTIME
Qty: 40 G | Refills: 0 | Status: SHIPPED | OUTPATIENT
Start: 2023-10-03 | End: 2023-10-08

## 2023-10-03 RX ORDER — POLYETHYLENE GLYCOL 3350 17 G/17G
POWDER, FOR SOLUTION ORAL
Qty: 238 G | Refills: 0 | Status: SHIPPED | OUTPATIENT
Start: 2023-10-03 | End: 2023-11-21

## 2023-10-03 NOTE — PROGRESS NOTES
Assessment & Plan     Menorrhagia with irregular cycle  - Chronic, unresponsive to medical management  - Scheduled for Robotic TLH, Bilat Salp 10/10/2023  - She understands the indications/risks/benefits/alternatives of the proposed procedure and has given informed consent.  - Has bowel prep info    Intramural, submucous, and subserous leiomyoma of uterus  - May be cause of above  - Plan as above    Iron deficiency anemia due to chronic blood loss  - Caused by menorrhagia  - Plan as above    Prophylactic antibiotic  - Rx sent to pharm clindamycin (CLEOCIN) 2 % vaginal cream; Place 1 applicator vaginally At Bedtime for 5 days Use for 5 nights prior to surgery date.    Other constipation  - Bowel prep supplies given  - polyethylene glycol (MIRALAX) 17 GM/Dose powder; Use as instructed on bowel prep info sheet.  - bisacodyl (DULCOLAX) 5 MG EC tablet; Use per instructions on bowel prep sheet      28 minutes spent by me on the date of the encounter doing chart review, history and exam, documentation and further activities per the note       BMI:   Estimated body mass index is 30.79 kg/m  as calculated from the following:    Height as of this encounter: 1.829 m (6').    Weight as of this encounter: 103 kg (227 lb).           No follow-ups on file.    Titus Asif MD  [facility] BARBARA Grace is a 43 year old, presenting for the following health issues:  Follow Up (10/10/2023 hysterectomy )      Pt w/ Hx menorrhagia to anemia, presumptively due to myomatous uterus.  Scheduled for robotic TLH, Bilat salp 10/10/2023.  Here to discuss surgery and review details, answer questions.                   Review of Systems         Objective    /80 (BP Location: Right arm, Patient Position: Sitting, Cuff Size: Adult Large)   Ht 1.829 m (6')   Wt 103 kg (227 lb)   BMI 30.79 kg/m    Body mass index is 30.79 kg/m .  Physical Exam  Constitutional:       General: She is not in acute distress.      Appearance: Normal appearance. She is normal weight. She is not ill-appearing.   Abdominal:      General: Abdomen is flat. There is no distension.      Palpations: Abdomen is soft. There is no mass.      Tenderness: There is no abdominal tenderness.   Neurological:      Mental Status: She is alert.            Pelvic U/S (5/17/2023):  Results for orders placed or performed in visit on 05/17/23   US Pelvic Complete with Transvaginal    Narrative    EXAM: ULTRASOUND PELVIC TRANSABDOMINAL AND TRANSVAGINAL  LOCATION: [facility]  DATE/TIME: 05/17/2023, 12:55 PM CDT    INDICATION: Menorrhagia with irregular cycle.  COMPARISON: None.  TECHNIQUE: Transabdominal scans were performed. Endovaginal ultrasound was performed to better visualize the adnexa.    FINDINGS:    UTERUS: 11.6 x 6.0 x 7.1 cm. Uterine fibroids identified. Left anterior fundal 4.6 x 3.3 x 3.4 cm intramural versus submucosal fibroid. 2.1 x 1.7 x 1.7 cm right fundal intramural versus subserosal fibroid. 2.1 x 1.9 x 1.5 cm intramural posterior distal   uterine fibroid.    ENDOMETRIUM: 10 mm. Normal smooth endometrium.    RIGHT OVARY: 2.5 x 1.6 x 1.6 cm. Normal.    LEFT OVARY: 3.1 x 1.8 x 1.7 cm. Dominant follicle is 2.1 cm and appears simple.    No significant free fluid.      Impression    IMPRESSION:  1.  Uterine fibroids.  2.  Incidental simple cyst versus dominant follicle at the left ovary consistent with a benign finding.      Premenopausal asymptomatic simple cyst:    <= 3 cm: Normal, no follow-up.    >3 to <= 5 cm: Benign finding in the physiologic size range. No follow-up is needed.    >5 to <= 7 cm: Benign simple cyst. Clinically inconsequential finding. Follow-up pelvic ultrasound in 6 months is recommended.    >7 cm: Benign simple cyst. Follow-up pelvic ultrasound in 6 months is recommended.    REFERENCE:  Management of Asymptomatic Ovarian and Other Adnexal Cysts Imaged at US: Society of Radiologists in Ultrasound  Consensus Conference Statement. Radiology September 2010; [phone].    Simple Adnexal Cysts: U Consensus Conference Update on Follow-up and Reporting. Radiology September 2019. [phone].

## 2023-10-03 NOTE — TELEPHONE ENCOUNTER
Patient dropped off forms in [place]. I will contact patient when they are completed.  Jeff Hartman, [facility]

## 2023-10-03 NOTE — NURSING NOTE
Chief Complaint   Patient presents with    Follow Up     10/10/2023 hysterectomy        Initial /80 (BP Location: Right arm, Patient Position: Sitting, Cuff Size: Adult Large)   Ht 1.829 m (6')   Wt 103 kg (227 lb)   BMI 30.79 kg/m   Estimated body mass index is 30.79 kg/m  as calculated from the following:    Height as of this encounter: 1.829 m (6').    Weight as of this encounter: 103 kg (227 lb).  BP completed using cuff size: regular    Questioned patient about current smoking habits.  Pt. has never smoked.          The following HM Due: NONE    Jeff Hartman CMA

## 2023-10-04 NOTE — TELEPHONE ENCOUNTER
I attempted to call patient 2x. Phone kept ringing with no VM. Her forms have been filled out and faxed back. Copy sent to scan and original placed inside bin at [place].    Jeff Hartman, CMA

## 2023-10-09 ENCOUNTER — TELEPHONE (OUTPATIENT)
Dept: OBGYN | Facility: CLINIC | Age: 43
End: 2023-10-09
Payer: COMMERCIAL

## 2023-10-09 NOTE — TELEPHONE ENCOUNTER
LM to inform pt that the surgery for tomorrow with Dr. Asif needs to be cancelled due to broken equipment (robot broken per hospital). Asked pt to call back to discuss r/s

## 2023-10-10 ENCOUNTER — HOSPITAL ENCOUNTER (OUTPATIENT)
Facility: CLINIC | Age: 43
Discharge: HOME | End: 2023-10-10
Attending: OBSTETRICS & GYNECOLOGY | Admitting: OBSTETRICS & GYNECOLOGY
Payer: COMMERCIAL

## 2023-10-10 ENCOUNTER — ANESTHESIA (OUTPATIENT)
Dept: SURGERY | Facility: CLINIC | Age: 43
End: 2023-10-10
Payer: COMMERCIAL

## 2023-10-10 ENCOUNTER — ANESTHESIA EVENT (OUTPATIENT)
Dept: SURGERY | Facility: CLINIC | Age: 43
End: 2023-10-10
Payer: COMMERCIAL

## 2023-10-10 VITALS
HEIGHT: 72 IN | BODY MASS INDEX: 30.15 KG/M2 | WEIGHT: 222.6 LBS | HEART RATE: 71 BPM | SYSTOLIC BLOOD PRESSURE: 118 MMHG | TEMPERATURE: 96.8 F | DIASTOLIC BLOOD PRESSURE: 82 MMHG | OXYGEN SATURATION: 98 % | RESPIRATION RATE: 12 BRPM

## 2023-10-10 DIAGNOSIS — D25.2 INTRAMURAL, SUBMUCOUS, AND SUBSEROUS LEIOMYOMA OF UTERUS: Chronic | ICD-10-CM

## 2023-10-10 DIAGNOSIS — D25.1 INTRAMURAL, SUBMUCOUS, AND SUBSEROUS LEIOMYOMA OF UTERUS: Chronic | ICD-10-CM

## 2023-10-10 DIAGNOSIS — D25.0 INTRAMURAL, SUBMUCOUS, AND SUBSEROUS LEIOMYOMA OF UTERUS: Chronic | ICD-10-CM

## 2023-10-10 DIAGNOSIS — N92.1 MENORRHAGIA WITH IRREGULAR CYCLE: Primary | Chronic | ICD-10-CM

## 2023-10-10 DIAGNOSIS — G89.18 POSTOPERATIVE PAIN: ICD-10-CM

## 2023-10-10 PROBLEM — Z90.710 HISTORY OF ROBOT-ASSISTED LAPAROSCOPIC HYSTERECTOMY: Status: ACTIVE | Noted: 2023-10-10

## 2023-10-10 LAB
BASO+EOS+MONOS # BLD AUTO: NORMAL 10*3/UL
BASO+EOS+MONOS NFR BLD AUTO: NORMAL %
BASOPHILS # BLD AUTO: 0.1 10E3/UL (ref 0–0.2)
BASOPHILS NFR BLD AUTO: 1 %
EOSINOPHIL # BLD AUTO: 0.1 10E3/UL (ref 0–0.7)
EOSINOPHIL NFR BLD AUTO: 2 %
ERYTHROCYTE [DISTWIDTH] IN BLOOD BY AUTOMATED COUNT: 13.5 % (ref 10–15)
HCG UR QL: NEGATIVE
HCT VFR BLD AUTO: 42.2 % (ref 35–47)
HGB BLD-MCNC: 13.9 G/DL (ref 11.7–15.7)
IMM GRANULOCYTES # BLD: 0 10E3/UL
IMM GRANULOCYTES NFR BLD: 0 %
LYMPHOCYTES # BLD AUTO: 2.2 10E3/UL (ref 0.8–5.3)
LYMPHOCYTES NFR BLD AUTO: 32 %
MCH RBC QN AUTO: 29.4 PG (ref 26.5–33)
MCHC RBC AUTO-ENTMCNC: 32.9 G/DL (ref 31.5–36.5)
MCV RBC AUTO: 89 FL (ref 78–100)
MONOCYTES # BLD AUTO: 0.6 10E3/UL (ref 0–1.3)
MONOCYTES NFR BLD AUTO: 9 %
NEUTROPHILS # BLD AUTO: 3.9 10E3/UL (ref 1.6–8.3)
NEUTROPHILS NFR BLD AUTO: 56 %
NRBC # BLD AUTO: 0 10E3/UL
NRBC BLD AUTO-RTO: 0 /100
PLATELET # BLD AUTO: 354 10E3/UL (ref 150–450)
RBC # BLD AUTO: 4.73 10E6/UL (ref 3.8–5.2)
WBC # BLD AUTO: 6.9 10E3/UL (ref 4–11)

## 2023-10-10 PROCEDURE — 710N000009 HC RECOVERY PHASE 1, LEVEL 1, PER MIN: Performed by: OBSTETRICS & GYNECOLOGY

## 2023-10-10 PROCEDURE — 250N000025 HC SEVOFLURANE, PER MIN: Performed by: OBSTETRICS & GYNECOLOGY

## 2023-10-10 PROCEDURE — 250N000009 HC RX 250: Performed by: NURSE ANESTHETIST, CERTIFIED REGISTERED

## 2023-10-10 PROCEDURE — 370N000017 HC ANESTHESIA TECHNICAL FEE, PER MIN: Performed by: OBSTETRICS & GYNECOLOGY

## 2023-10-10 PROCEDURE — 999N000141 HC STATISTIC PRE-PROCEDURE NURSING ASSESSMENT: Performed by: OBSTETRICS & GYNECOLOGY

## 2023-10-10 PROCEDURE — 250N000011 HC RX IP 250 OP 636: Performed by: NURSE ANESTHETIST, CERTIFIED REGISTERED

## 2023-10-10 PROCEDURE — 258N000003 HC RX IP 258 OP 636: Performed by: ANESTHESIOLOGY

## 2023-10-10 PROCEDURE — 360N000080 HC SURGERY LEVEL 7, PER MIN: Performed by: OBSTETRICS & GYNECOLOGY

## 2023-10-10 PROCEDURE — S2900 ROBOTIC SURGICAL SYSTEM: HCPCS | Performed by: OBSTETRICS & GYNECOLOGY

## 2023-10-10 PROCEDURE — 250N000011 HC RX IP 250 OP 636: Performed by: OBSTETRICS & GYNECOLOGY

## 2023-10-10 PROCEDURE — 250N000013 HC RX MED GY IP 250 OP 250 PS 637: Performed by: OBSTETRICS & GYNECOLOGY

## 2023-10-10 PROCEDURE — 258N000003 HC RX IP 258 OP 636: Performed by: NURSE ANESTHETIST, CERTIFIED REGISTERED

## 2023-10-10 PROCEDURE — 88307 TISSUE EXAM BY PATHOLOGIST: CPT | Mod: 26 | Performed by: PATHOLOGY

## 2023-10-10 PROCEDURE — 36415 COLL VENOUS BLD VENIPUNCTURE: CPT | Performed by: OBSTETRICS & GYNECOLOGY

## 2023-10-10 PROCEDURE — 85025 COMPLETE CBC W/AUTO DIFF WBC: CPT | Performed by: OBSTETRICS & GYNECOLOGY

## 2023-10-10 PROCEDURE — 250N000009 HC RX 250: Performed by: OBSTETRICS & GYNECOLOGY

## 2023-10-10 PROCEDURE — 710N000012 HC RECOVERY PHASE 2, PER MINUTE: Performed by: OBSTETRICS & GYNECOLOGY

## 2023-10-10 PROCEDURE — C1765 ADHESION BARRIER: HCPCS | Performed by: OBSTETRICS & GYNECOLOGY

## 2023-10-10 PROCEDURE — 250N000011 HC RX IP 250 OP 636: Mod: JZ | Performed by: NURSE ANESTHETIST, CERTIFIED REGISTERED

## 2023-10-10 PROCEDURE — 258N000001 HC RX 258: Performed by: OBSTETRICS & GYNECOLOGY

## 2023-10-10 PROCEDURE — 250N000013 HC RX MED GY IP 250 OP 250 PS 637: Performed by: ANESTHESIOLOGY

## 2023-10-10 PROCEDURE — 272N000001 HC OR GENERAL SUPPLY STERILE: Performed by: OBSTETRICS & GYNECOLOGY

## 2023-10-10 PROCEDURE — 88307 TISSUE EXAM BY PATHOLOGIST: CPT | Mod: TC | Performed by: OBSTETRICS & GYNECOLOGY

## 2023-10-10 PROCEDURE — 58573 TLH W/T/O UTERUS OVER 250 G: CPT | Performed by: OBSTETRICS & GYNECOLOGY

## 2023-10-10 PROCEDURE — 81025 URINE PREGNANCY TEST: CPT | Performed by: OBSTETRICS & GYNECOLOGY

## 2023-10-10 RX ORDER — HYDROMORPHONE HCL IN WATER/PF 6 MG/30 ML
0.4 PATIENT CONTROLLED ANALGESIA SYRINGE INTRAVENOUS EVERY 5 MIN PRN
Status: DISCONTINUED | OUTPATIENT
Start: 2023-10-10 | End: 2023-10-10 | Stop reason: HOSPADM

## 2023-10-10 RX ORDER — ALBUTEROL SULFATE 0.83 MG/ML
2.5 SOLUTION RESPIRATORY (INHALATION) EVERY 4 HOURS PRN
Status: DISCONTINUED | OUTPATIENT
Start: 2023-10-10 | End: 2023-10-10 | Stop reason: HOSPADM

## 2023-10-10 RX ORDER — OXYCODONE HYDROCHLORIDE 5 MG/1
5 TABLET ORAL
Status: COMPLETED | OUTPATIENT
Start: 2023-10-10 | End: 2023-10-10

## 2023-10-10 RX ORDER — LIDOCAINE HYDROCHLORIDE 20 MG/ML
INJECTION, SOLUTION INFILTRATION; PERINEURAL PRN
Status: DISCONTINUED | OUTPATIENT
Start: 2023-10-10 | End: 2023-10-10

## 2023-10-10 RX ORDER — DEXAMETHASONE SODIUM PHOSPHATE 4 MG/ML
INJECTION, SOLUTION INTRA-ARTICULAR; INTRALESIONAL; INTRAMUSCULAR; INTRAVENOUS; SOFT TISSUE PRN
Status: DISCONTINUED | OUTPATIENT
Start: 2023-10-10 | End: 2023-10-10

## 2023-10-10 RX ORDER — ONDANSETRON 4 MG/1
4 TABLET, ORALLY DISINTEGRATING ORAL EVERY 30 MIN PRN
Status: DISCONTINUED | OUTPATIENT
Start: 2023-10-10 | End: 2023-10-10 | Stop reason: HOSPADM

## 2023-10-10 RX ORDER — PHENAZOPYRIDINE HYDROCHLORIDE 200 MG/1
200 TABLET, FILM COATED ORAL ONCE
Status: COMPLETED | OUTPATIENT
Start: 2023-10-10 | End: 2023-10-10

## 2023-10-10 RX ORDER — OXYCODONE HYDROCHLORIDE 5 MG/1
10 TABLET ORAL
Status: DISCONTINUED | OUTPATIENT
Start: 2023-10-10 | End: 2023-10-10 | Stop reason: HOSPADM

## 2023-10-10 RX ORDER — CEFAZOLIN SODIUM/WATER 2 G/20 ML
2 SYRINGE (ML) INTRAVENOUS SEE ADMIN INSTRUCTIONS
Status: DISCONTINUED | OUTPATIENT
Start: 2023-10-10 | End: 2023-10-10 | Stop reason: HOSPADM

## 2023-10-10 RX ORDER — HYDROMORPHONE HCL IN WATER/PF 6 MG/30 ML
0.2 PATIENT CONTROLLED ANALGESIA SYRINGE INTRAVENOUS EVERY 5 MIN PRN
Status: DISCONTINUED | OUTPATIENT
Start: 2023-10-10 | End: 2023-10-10 | Stop reason: HOSPADM

## 2023-10-10 RX ORDER — ONDANSETRON 2 MG/ML
4 INJECTION INTRAMUSCULAR; INTRAVENOUS EVERY 30 MIN PRN
Status: DISCONTINUED | OUTPATIENT
Start: 2023-10-10 | End: 2023-10-10 | Stop reason: HOSPADM

## 2023-10-10 RX ORDER — CEFAZOLIN SODIUM/WATER 2 G/20 ML
2 SYRINGE (ML) INTRAVENOUS
Status: COMPLETED | OUTPATIENT
Start: 2023-10-10 | End: 2023-10-10

## 2023-10-10 RX ORDER — FENTANYL CITRATE 50 UG/ML
50 INJECTION, SOLUTION INTRAMUSCULAR; INTRAVENOUS EVERY 5 MIN PRN
Status: DISCONTINUED | OUTPATIENT
Start: 2023-10-10 | End: 2023-10-10 | Stop reason: HOSPADM

## 2023-10-10 RX ORDER — PROPOFOL 10 MG/ML
INJECTION, EMULSION INTRAVENOUS PRN
Status: DISCONTINUED | OUTPATIENT
Start: 2023-10-10 | End: 2023-10-10

## 2023-10-10 RX ORDER — HYDRALAZINE HYDROCHLORIDE 20 MG/ML
2.5-5 INJECTION INTRAMUSCULAR; INTRAVENOUS EVERY 10 MIN PRN
Status: DISCONTINUED | OUTPATIENT
Start: 2023-10-10 | End: 2023-10-10 | Stop reason: HOSPADM

## 2023-10-10 RX ORDER — FENTANYL CITRATE 50 UG/ML
25 INJECTION, SOLUTION INTRAMUSCULAR; INTRAVENOUS EVERY 5 MIN PRN
Status: DISCONTINUED | OUTPATIENT
Start: 2023-10-10 | End: 2023-10-10 | Stop reason: HOSPADM

## 2023-10-10 RX ORDER — BUPIVACAINE HYDROCHLORIDE 5 MG/ML
INJECTION, SOLUTION EPIDURAL; INTRACAUDAL PRN
Status: DISCONTINUED | OUTPATIENT
Start: 2023-10-10 | End: 2023-10-10 | Stop reason: HOSPADM

## 2023-10-10 RX ORDER — LABETALOL HYDROCHLORIDE 5 MG/ML
10 INJECTION, SOLUTION INTRAVENOUS
Status: DISCONTINUED | OUTPATIENT
Start: 2023-10-10 | End: 2023-10-10 | Stop reason: HOSPADM

## 2023-10-10 RX ORDER — SODIUM CHLORIDE, SODIUM LACTATE, POTASSIUM CHLORIDE, CALCIUM CHLORIDE 600; 310; 30; 20 MG/100ML; MG/100ML; MG/100ML; MG/100ML
INJECTION, SOLUTION INTRAVENOUS CONTINUOUS
Status: DISCONTINUED | OUTPATIENT
Start: 2023-10-10 | End: 2023-10-10 | Stop reason: HOSPADM

## 2023-10-10 RX ORDER — FENTANYL CITRATE 50 UG/ML
INJECTION, SOLUTION INTRAMUSCULAR; INTRAVENOUS PRN
Status: DISCONTINUED | OUTPATIENT
Start: 2023-10-10 | End: 2023-10-10

## 2023-10-10 RX ORDER — ONDANSETRON 2 MG/ML
INJECTION INTRAMUSCULAR; INTRAVENOUS PRN
Status: DISCONTINUED | OUTPATIENT
Start: 2023-10-10 | End: 2023-10-10

## 2023-10-10 RX ORDER — OXYCODONE HYDROCHLORIDE 5 MG/1
5 TABLET ORAL EVERY 6 HOURS PRN
Qty: 10 TABLET | Refills: 0 | Status: SHIPPED | OUTPATIENT
Start: 2023-10-10 | End: 2023-11-21

## 2023-10-10 RX ORDER — ACETAMINOPHEN 325 MG/1
975 TABLET ORAL ONCE
Status: COMPLETED | OUTPATIENT
Start: 2023-10-10 | End: 2023-10-10

## 2023-10-10 RX ORDER — IBUPROFEN 600 MG/1
600 TABLET, FILM COATED ORAL EVERY 6 HOURS PRN
Qty: 30 TABLET | Refills: 0 | Status: SHIPPED | OUTPATIENT
Start: 2023-10-10 | End: 2023-11-21

## 2023-10-10 RX ADMIN — ROCURONIUM BROMIDE 20 MG: 50 INJECTION, SOLUTION INTRAVENOUS at 08:10

## 2023-10-10 RX ADMIN — ROCURONIUM BROMIDE 50 MG: 50 INJECTION, SOLUTION INTRAVENOUS at 07:54

## 2023-10-10 RX ADMIN — DEXAMETHASONE SODIUM PHOSPHATE 8 MG: 4 INJECTION, SOLUTION INTRA-ARTICULAR; INTRALESIONAL; INTRAMUSCULAR; INTRAVENOUS; SOFT TISSUE at 07:54

## 2023-10-10 RX ADMIN — PROPOFOL 200 MG: 10 INJECTION, EMULSION INTRAVENOUS at 07:54

## 2023-10-10 RX ADMIN — ONDANSETRON 4 MG: 2 INJECTION INTRAMUSCULAR; INTRAVENOUS at 10:27

## 2023-10-10 RX ADMIN — FENTANYL CITRATE 100 MCG: 50 INJECTION INTRAMUSCULAR; INTRAVENOUS at 07:54

## 2023-10-10 RX ADMIN — PHENYLEPHRINE HYDROCHLORIDE 100 MCG: 10 INJECTION INTRAVENOUS at 08:40

## 2023-10-10 RX ADMIN — ACETAMINOPHEN 975 MG: 325 TABLET, FILM COATED ORAL at 07:04

## 2023-10-10 RX ADMIN — PHENAZOPYRIDINE 200 MG: 200 TABLET ORAL at 07:04

## 2023-10-10 RX ADMIN — SODIUM CHLORIDE, POTASSIUM CHLORIDE, SODIUM LACTATE AND CALCIUM CHLORIDE: 600; 310; 30; 20 INJECTION, SOLUTION INTRAVENOUS at 07:05

## 2023-10-10 RX ADMIN — Medication 2 G: at 07:59

## 2023-10-10 RX ADMIN — SUGAMMADEX 200 MG: 100 INJECTION, SOLUTION INTRAVENOUS at 10:42

## 2023-10-10 RX ADMIN — LIDOCAINE HYDROCHLORIDE 50 MG: 20 INJECTION, SOLUTION INFILTRATION; PERINEURAL at 07:54

## 2023-10-10 RX ADMIN — SODIUM CHLORIDE, POTASSIUM CHLORIDE, SODIUM LACTATE AND CALCIUM CHLORIDE: 600; 310; 30; 20 INJECTION, SOLUTION INTRAVENOUS at 09:45

## 2023-10-10 RX ADMIN — HYDROMORPHONE HYDROCHLORIDE 0.5 MG: 1 INJECTION, SOLUTION INTRAMUSCULAR; INTRAVENOUS; SUBCUTANEOUS at 08:14

## 2023-10-10 RX ADMIN — OXYCODONE HYDROCHLORIDE 5 MG: 5 TABLET ORAL at 12:07

## 2023-10-10 RX ADMIN — HYDROMORPHONE HYDROCHLORIDE 0.5 MG: 1 INJECTION, SOLUTION INTRAMUSCULAR; INTRAVENOUS; SUBCUTANEOUS at 10:27

## 2023-10-10 RX ADMIN — ROCURONIUM BROMIDE 20 MG: 50 INJECTION, SOLUTION INTRAVENOUS at 09:24

## 2023-10-10 ASSESSMENT — ACTIVITIES OF DAILY LIVING (ADL)
ADLS_ACUITY_SCORE: 35

## 2023-10-10 ASSESSMENT — LIFESTYLE VARIABLES: TOBACCO_USE: 1

## 2023-10-10 NOTE — DISCHARGE INSTRUCTIONS
GENERAL ANESTHESIA OR SEDATION ADULT DISCHARGE INSTRUCTIONS   SPECIAL PRECAUTIONS FOR 24 HOURS AFTER SURGERY    IT IS NOT UNUSUAL TO FEEL LIGHT-HEADED OR FAINT, UP TO 24 HOURS AFTER SURGERY OR WHILE TAKING PAIN MEDICATION.  IF YOU HAVE THESE SYMPTOMS; SIT FOR A FEW MINUTES BEFORE STANDING AND HAVE SOMEONE ASSIST YOU WHEN YOU GET UP TO WALK OR USE THE BATHROOM.    YOU SHOULD REST AND RELAX FOR THE NEXT 24 HOURS AND YOU MUST MAKE ARRANGEMENTS TO HAVE SOMEONE STAY WITH YOU FOR AT LEAST 24 HOURS AFTER YOUR DISCHARGE.  AVOID HAZARDOUS AND STRENUOUS ACTIVITIES.  DO NOT MAKE IMPORTANT DECISIONS FOR 24 HOURS.    DO NOT DRIVE ANY VEHICLE OR OPERATE MECHANICAL EQUIPMENT FOR 24 HOURS FOLLOWING THE END OF YOUR SURGERY.  EVEN THOUGH YOU MAY FEEL NORMAL, YOUR REACTIONS MAY BE AFFECTED BY THE MEDICATION YOU HAVE RECEIVED.    DO NOT DRINK ALCOHOLIC BEVERAGES FOR 24 HOURS FOLLOWING YOUR SURGERY.    DRINK CLEAR LIQUIDS (APPLE JUICE, GINGER ALE, 7-UP, BROTH, ETC.).  PROGRESS TO YOUR REGULAR DIET AS YOU FEEL ABLE.    YOU MAY HAVE A DRY MOUTH, A SORE THROAT, MUSCLES ACHES OR TROUBLE SLEEPING.  THESE SHOULD GO AWAY AFTER 24 HOURS.    CALL YOUR DOCTOR FOR ANY OF THE FOLLOWING:  SIGNS OF INFECTION (FEVER, GROWING TENDERNESS AT THE SURGERY SITE, A LARGE AMOUNT OF DRAINAGE OR BLEEDING, SEVERE PAIN, FOUL-SMELLING DRAINAGE, REDNESS OR SWELLING.    IT HAS BEEN OVER 8 TO 10 HOURS SINCE SURGERY AND YOU ARE STILL NOT ABLE TO URINATE (PASS WATER).      Maximum acetaminophen (Tylenol) dose from all sources should not exceed 4 grams (4000 mg) per day. You had a dose of 975 mg Tylenol at 7:00 am, you may take another dose at 1:00 pm today.  You were given a medication that makes your urine orange    DR. ADRIANA PANTOJA M.D.   CLINIC PHONE NUMBER [phone].  [facility]

## 2023-10-10 NOTE — ANESTHESIA PROCEDURE NOTES
Airway       Patient location during procedure: OR       Procedure Start/Stop Times: 10/10/2023 7:57 AM  Staff -        CRNA: Julio Mendez APRN CRNA       Performed By: CRNAIndications and Patient Condition       Indications for airway management: guzman-procedural and airway protection       Induction type:intravenous       Mask difficulty assessment: 1 - vent by mask    Final Airway Details       Final airway type: endotracheal airway       Successful airway: ETT - single  Endotracheal Airway Details        ETT size (mm): 7.0       Cuffed: yes       Successful intubation technique: direct laryngoscopy       DL Blade Type: MAC 3       Grade View of Cords: 1       Adjucts: stylet       Position: Right       Measured from: gums/teeth       Secured at (cm): 22       Bite block used: None    Post intubation assessment        Placement verified by: capnometry, equal breath sounds and chest rise        Number of attempts at approach: 1       Secured with: plastic tape       Ease of procedure: easy       Dentition: Intact    Medication(s) Administered   Medication Administration Time: 10/10/2023 7:57 AM

## 2023-10-10 NOTE — OP NOTE
Operative Report    PREOPERATIVE DIAGNOSIS:  Menorrhagia, Fibroid uterus   POSTOPERATIVE DIAGNOSES:  Same  PROCEDURE: Robotic-assisted laparoscopic total hysterectomy, Bilateral salpingectomy, Cystoscopy  SURGEON: Titus Asif MD   ASSISTANT: Elva Tompkins [facility]   ANESTHESIA: General endotracheal anesthesia.   FLUIDS: 1500 cc of lactated Ringers, Ancef 2 gram(s)  IV preop.   ESTIMATED BLOOD LOSS: 20 cc.   DRAINS: None.   INDICATIONS FOR PROCEDURE:  Patient  is a 43 year old woman , who has had menorrhagia attributed to large uterine fibroids that has been unresponsive to medical management.  Therefore she desires definitive therapy via hysterectomy.  As to the route of the procedure, she wants the robotic-assisted approach as given her enlarged uterus and limited descent due to the uterine size.  This will maximize her chances of avoiding abdominal hysterectomy.  To reduce the risk of possible tubal sources of ovarian cancers, a bilateral salpingectomy will also be done.  The ovaries will be preserved assuming they are normal.  She understands that the risks of the procedure includes bleeding, infection, injury to the bowel, bladder, ureters, ovaries, nerves, blood vessels, and any other intra-abdominal or pelvic organs and the risks of general anesthesia including aspiration, malignant hypothermia and death.  She accepts all these risks and factors in the decisionmaking process of proceeding with this procedure and she has given informed consent.   FINDINGS: On pelvic exam under anesthesia, the uterus is approximately 12 weeks size with globular contour. The adnexa are without masses. On robotic-assisted laparoscopy, the uterus was noted to be 12 weeks size with globular appearance and some fibroids, but also appears to likely have adenomyosis.  The ovaries and tubes appeared normal bilaterally. The cul-de-sac appeared normal.  There were no adhesions.  There was no endometriosis.  The upper  abdomen was normal.   SPECIMENS: Uterus and cervix, both tubes.   Total weight was 358 g.  PROCEDURE IN DETAIL:   After proper patient identification and consent for procedure was obtained, the patient was taken to the operating room where adequate general endotracheal anesthesia was obtained. The patient was placed in dorsal lithotomy position with legs in Rick stirrups and positioned appropriately for robotic-assisted hysterectomy. She was prepped and draped in the usual sterile manner for this procedure.   A Yang catheter was placed. A single arm speculum was placed in the vagina to visualize the cervix, which was then grasped at 12 o'clock with a single-tooth tenaculum for downward traction of the uterus. The cervix was gently dilated up to a #8 Hegar dilator. The extra-large V-Care was inserted through the endocervical canal into the uterine cavity, and the balloon was inflated with saline. The tenaculum and speculum were removed.  The V-Care cup was advanced to the fornices, and the locking cup was advanced to lock the cup in place.  Attention was then turned to the abdomen.   With abdominal wall tenting, a Veress needle was inserted through the umbilicus into the peritoneal cavity.  Low flow insufflation with CO2 was begun with opening pressure of 5 mmHg.  After 1 liter was insufflated with low flow, high flow insufflation completed the process for a total of 2.5 L. The Veress needle was removed.  An 8 mm incision was placed approximately 3 cm above the umbilicus in the midline transversely and then the 8-mm robotic camera port was inserted under direct visualization with the 5 mm laparoscope using the Optiview trocar into the peritoneal cavity without difficulty.  The initial assessment revealed the above noted findings.   A 5 mm skin incision was made with the knife on the right lateral abdomen for the assistant port.   A 5 mm AirSeal port was inserted under direct visualization with the laparoscope  without trauma to underlying viscera.  The trocar was removed. There were no intraabdominal adhesions underneath this port site as well as no injury to the abdominal viscera.  The scope was placed through the camera port.  The left and right 8 mm robotic ports were then placed after determining proper position for each port. An 8 mm skin incision was made with a knife bilaterally and the ports were placed under direct visualization with the laparoscope.    Once all ports were placed correctly as per the Vascular Closurei specifications, the Vascular Closurei surgical robot was then moved into position and docked to the camera and robotic arm ports. The camera was then placed into the central port and a SynchroSeal vessel sealer was placed in the left port and a monopolar scissor was placed in the right port.   At this point I assumed my position at the console, and the ureters were identified bilaterally and avoided during the procedure.  The right mesosalpinx was sealed and divided using the SynchroSeal and monopolar scissors. The uterus was always elevated cranially with one of the assistants elevating the V-Care to keep the ureters out of harm's way.  Attention was then turned to the contralateral side where a similar process was performed sealing and dividing the mesosalpinx. The round ligaments were likewise sealed and divided bilaterally.  This allowed opening of the broad ligament and access to the retroperitoneal space.  The utero-ovarian ligaments were sealed and divided.  The anterior leaf of the broad ligament was then divided by combination of the SynchroSeal and/or monopolar cautery with the scissors to the level of the bladder flap. The 2 sides were connected in the central portion and the bladder flap was developed with sharp dissection and also using monopolar cautery keeping it well away from the actual bladder.   Once the bladder was well below the V-Care cup assuring that the bladder and ureters were out of harm's  way, the uterine vessels were then sealed and divided along the uterine sidewall. This was done on both sides using the SynchroSeal and monopolar scissors.  A colpotomy was then performed using scissors on pure cutting current posteriorly using the V-Care cup as a guide, and the vaginal cuff was incised bilaterally until near the uterine vessels, still preserving some minimal vaginal bleeding from the cuff edges to maintain good vascularization of the tissues.  The anterior colpotomy was likewise performed.  These transection sites were merged to the anterior and posterior colpotomies resulting in amputation of the cervix from the vaginal vault.  The uterus and tubes were brought out by the assistant into the vagina and removed.  A packing was placed in the vagina to maintain pneumoperitoneum.   The vaginal cuff was then closed using a 2-0 V-Loc 180 suture in a running horizontal manner starting from the right apex and going across to the left apex and over-sewing it back re-approximating the endopelvic fascial layer the full width of the cuff to make a full 2-layer closure.  The stitch continued back again for 2 throws to anchor it.  The pelvis was irrigated with normal saline and cleared of any clots and debris. All pedicles and the vaginal cuff were noted to be hemostatic after depressurizing the abdomen to 8 mmHg. A swatch of Interceed was placed over the vaginal cuff, covering the barbed suture line as well as surrounding tissues.   At this point, all pedicles were still hemostatic and so all the robot ports were undocked, and the robot was moved away from the operative field.   The pneumoperitoneum was released from the abdomen and all ports were removed. All skin incisions were reapproximated with 4-0 Vicryl subcuticular simple sutures. 0.5% Marcaine was infiltrated into all port sites for postop analgesia. Steri-Strips were placed.   Attention was turned to the cystoscopy.  This was indicated to confirm  ureteral integrity after noting that the vaginal cuff closure was in close proximity to the right ureter.  The vaginal packing and Yang were removed.  The external sheath and obturator were inserted through the urethra into the bladder.  The obturator was removed.  The 70 degree cystoscope was placed through the sheath using saline as distention medium.  Both ureteral orifices were seen and effluxed Pyridium-stained urine confirming both are patent and intact.  The rest of the bladder was also intact with no suture present.  The cystoscope was removed after the bladder was drained.  The patient tolerated the procedure well. Sponge, instrument and needle counts were correct x3. The patient was taken to the recovery room and extubated in stable condition.     Titus Asif MD

## 2023-10-10 NOTE — ANESTHESIA POSTPROCEDURE EVALUATION
Patient: Lesly Herrera    Procedure: Procedure(s):  ROBOTIC ASSISTED TOTAL LAPAROSCOPIC HYSTERECTOMY, BILATERAL SALPINGECTOMY, CYSTOSCOPY       Anesthesia Type:  General    Note:  Disposition: Outpatient   Postop Pain Control: Uneventful            Sign Out: Well controlled pain   PONV: No   Neuro/Psych: Uneventful            Sign Out: Acceptable/Baseline neuro status   Airway/Respiratory: Uneventful            Sign Out: Acceptable/Baseline resp. status   CV/Hemodynamics: Uneventful            Sign Out: Acceptable CV status; No obvious hypovolemia; No obvious fluid overload   Other NRE:    DID A NON-ROUTINE EVENT OCCUR? No           Last vitals:  Vitals Value Taken Time   /73 10/10/23 1225   Temp 97.6  F (36.4  C) 10/10/23 1055   Pulse 79 10/10/23 1228   Resp 12 10/10/23 1224   SpO2 100 % 10/10/23 1229   Vitals shown include unvalidated device data.    Electronically Signed By: Toma Corado MD  October 10, 2023  1:05 PM

## 2023-10-10 NOTE — ANESTHESIA PREPROCEDURE EVALUATION
Anesthesia Pre-Procedure Evaluation    Patient: Lesly Herrera   MRN: [id number] : 1980        Procedure : Procedure(s):  LAPAROSCOPIC ASSISTED HYSTERECTOMY WITH DA CANDY XI, WITH BILATERAL SALPINGECTOMY          Past Medical History:   Diagnosis Date    Anemia     Fibroid       Past Surgical History:   Procedure Laterality Date    wisdom teeth        No Known Allergies   Social History     Tobacco Use    Smoking status: Some Days     Packs/day: 0.05     Types: Cigarettes     Last attempt to quit: 6/3/2019     Years since quittin.3     Passive exposure: Current    Smokeless tobacco: Never   Substance Use Topics    Alcohol use: Yes     Comment: 6 drinks per week average      Wt Readings from Last 1 Encounters:   10/10/23 101 kg (222 lb 9.6 oz)        Anesthesia Evaluation            ROS/MED HX  ENT/Pulmonary:     (+)                tobacco use,                       Neurologic:       Cardiovascular:  - neg cardiovascular ROS     METS/Exercise Tolerance:     Hematologic:       Musculoskeletal:       GI/Hepatic:       Renal/Genitourinary:       Endo:     (+)               Obesity (BMI 30\),       Psychiatric/Substance Use:       Infectious Disease:       Malignancy:       Other:            Physical Exam    Airway        Mallampati: II   TM distance: > 3 FB   Neck ROM: full   Mouth opening: > 3 cm    Respiratory Devices and Support         Dental           Cardiovascular   cardiovascular exam normal          Pulmonary   pulmonary exam normal                OUTSIDE LABS:  CBC:   Lab Results   Component Value Date    WBC 6.1 2023    WBC 8.8 2023    HGB 13.2 2023    HGB 11.6 (L) 2023    HCT 40.4 2023    HCT 38.9 2023     2023     (H) 2023     BMP:   Lab Results   Component Value Date     2023    POTASSIUM 4.3 2023    CHLORIDE 105 2023    CO2 22 2023    BUN 8.4 2023    CR 0.78 2023     (H)  09/22/2023    GLC 94 11/25/2014     COAGS:   Lab Results   Component Value Date    INR 0.96 05/12/2023    FIBR 358 05/12/2023     POC:   Lab Results   Component Value Date    HCG Negative 03/23/2023     HEPATIC:   Lab Results   Component Value Date    ALBUMIN 4.4 09/22/2023    PROTTOTAL 7.4 09/22/2023    ALT 33 09/22/2023    AST 24 09/22/2023    ALKPHOS 87 09/22/2023    BILITOTAL 0.2 09/22/2023     OTHER:   Lab Results   Component Value Date    A1C 5.7 (H) 09/22/2023    MAGI 9.1 09/22/2023    TSH 1.50 09/22/2023       Anesthesia Plan    ASA Status:  2    NPO Status:  NPO Appropriate    Anesthesia Type: General.     - Airway: ETT   Induction: Intravenous.   Maintenance: Balanced.        Consents    Anesthesia Plan(s) and associated risks, benefits, and realistic alternatives discussed. Questions answered and patient/representative(s) expressed understanding.     - Discussed:     - Discussed with:  Patient            Postoperative Care    Pain management: IV analgesics, Oral pain medications, Multi-modal analgesia.   PONV prophylaxis: Ondansetron (or other 5HT-3), Dexamethasone or Solumedrol     Comments:                Toma Corado MD

## 2023-10-10 NOTE — ANESTHESIA CARE TRANSFER NOTE
Patient: Lesly Herrera    Procedure: Procedure(s):  ROBOTIC ASSISTED TOTAL LAPAROSCOPIC HYSTERECTOMY, BILATERAL SALPINGECTOMY, CYSTOSCOPY       Diagnosis: Intramural, submucous, and subserous leiomyoma of uterus [D25.1, D25.0, D25.2]  Menorrhagia with irregular cycle [N92.1]  Iron deficiency anemia due to chronic blood loss [D50.0]  Diagnosis Additional Information: No value filed.    Anesthesia Type:   General     Note:    Oropharynx: oropharynx clear of all foreign objects and spontaneously breathing  Level of Consciousness: awake  Oxygen Supplementation: face mask  Level of Supplemental Oxygen (L/min / FiO2): 10  Independent Airway: airway patency satisfactory and stable  Dentition: dentition unchanged  Vital Signs Stable: post-procedure vital signs reviewed and stable  Report to RN Given: handoff report given  Patient transferred to: PACU    Handoff Report: Identifed the Patient, Identified the Reponsible Provider, Reviewed the pertinent medical history, Discussed the surgical course, Reviewed Intra-OP anesthesia mangement and issues during anesthesia, Set expectations for post-procedure period and Allowed opportunity for questions and acknowledgement of understanding      Vitals:  Vitals Value Taken Time   /78 10/10/23 1105   Temp 97.6  F (36.4  C) 10/10/23 1055   Pulse 81 10/10/23 1107   Resp     SpO2 99 % 10/10/23 1107   Vitals shown include unvalidated device data.    Electronically Signed By: HE Jon CRNA  October 10, 2023  11:09 AM

## 2023-10-11 LAB
PATH REPORT.COMMENTS IMP SPEC: NORMAL
PATH REPORT.COMMENTS IMP SPEC: NORMAL
PATH REPORT.FINAL DX SPEC: NORMAL
PATH REPORT.GROSS SPEC: NORMAL
PATH REPORT.MICROSCOPIC SPEC OTHER STN: NORMAL
PATH REPORT.RELEVANT HX SPEC: NORMAL
PHOTO IMAGE: NORMAL

## 2023-11-21 ENCOUNTER — OFFICE VISIT (OUTPATIENT)
Dept: OBGYN | Facility: CLINIC | Age: 43
End: 2023-11-21
Payer: COMMERCIAL

## 2023-11-21 VITALS — SYSTOLIC BLOOD PRESSURE: 122 MMHG | BODY MASS INDEX: 30.31 KG/M2 | DIASTOLIC BLOOD PRESSURE: 70 MMHG | WEIGHT: 223.5 LBS

## 2023-11-21 DIAGNOSIS — Z09 POSTOP CHECK: Primary | ICD-10-CM

## 2023-11-21 PROCEDURE — 99024 POSTOP FOLLOW-UP VISIT: CPT | Performed by: OBSTETRICS & GYNECOLOGY

## 2023-11-21 NOTE — PROGRESS NOTES
CC:  Here for post-op check-up.      HPI:  Procedure: Robotic-assisted laparoscopic total hysterectomy, Bilateral salpingectomy, Cystoscopy   Date of procedure: 10/10/2023  Post-op course:  Uncomplicated thus far.  Normal bowel and bladder habits.  No vaginal bleeding or drainage.    Pathology:   Uterus, cervix, bilateral fallopian tubes, hysterectomy with bilateral salpingectomy:  --335.2 g uterus.  --Inactive endometrium with exogenous hormone effect.  --Myometrium with multiple leiomyomas ranging from 0.5 to 1.0 cm.  --Benign bilateral fallopian tubes with focal stromal hormone changes.  --Cervix negative for dysplasia.      OBJECTIVE:   /70   Wt 101.4 kg (223 lb 8 oz)   LMP 06/08/2023 (Approximate)   BMI 30.31 kg/m    Abdomen:  ND, soft, NT  Incisions: 4 Laparoscopy scars well-healed.  Pelvic:  Vaginal cuff healing well with no erythema, induration, or drainage.    ASSESSMENT:   Encounter Diagnosis   Name Primary?    Postop check Yes         PLAN:      Pt. to f/u for routine annual exams with bimanual exams for ovaries only.  She no longer needs Pap testing due to absence of cervix and no history of cervical cancer.      Titus Asif MD  [facility] [place]

## 2023-11-21 NOTE — NURSING NOTE
Chief Complaint   Patient presents with    Post-op Visit     Patient had TLH on 10/10/23. States she is feeling good. No c/o VB or pain. No questions today       Initial /70   Wt 101.4 kg (223 lb 8 oz)   LMP 2023 (Approximate)   BMI 30.31 kg/m   Estimated body mass index is 30.31 kg/m  as calculated from the following:    Height as of 10/10/23: 1.829 m (6').    Weight as of this encounter: 101.4 kg (223 lb 8 oz).  BP completed using cuff size: regular    Questioned patient about current smoking habits.  Pt. currently smokes.  Advised about smoking cessation.          The following HM Due: NONE    Anatoly José CMA

## 2023-12-01 ENCOUNTER — OFFICE VISIT (OUTPATIENT)
Dept: FAMILY MEDICINE | Facility: CLINIC | Age: 43
End: 2023-12-01
Payer: COMMERCIAL

## 2023-12-01 VITALS
RESPIRATION RATE: 18 BRPM | BODY MASS INDEX: 30.31 KG/M2 | HEIGHT: 72 IN | DIASTOLIC BLOOD PRESSURE: 86 MMHG | OXYGEN SATURATION: 97 % | HEART RATE: 80 BPM | TEMPERATURE: 98.7 F | SYSTOLIC BLOOD PRESSURE: 133 MMHG

## 2023-12-01 DIAGNOSIS — R05.1 ACUTE COUGH: Primary | ICD-10-CM

## 2023-12-01 PROCEDURE — 87637 SARSCOV2&INF A&B&RSV AMP PRB: CPT | Performed by: STUDENT IN AN ORGANIZED HEALTH CARE EDUCATION/TRAINING PROGRAM

## 2023-12-01 PROCEDURE — 99213 OFFICE O/P EST LOW 20 MIN: CPT | Mod: GC | Performed by: STUDENT IN AN ORGANIZED HEALTH CARE EDUCATION/TRAINING PROGRAM

## 2023-12-01 NOTE — LETTER
[address]  [address]  [facility] [address]  Phone: [phone]  Fax: [phone]    December 1, 2023        Lesly Herrera  [address]  [facility] [address]          To whom it may concern:    RE: Lesly Grace was ill Wednesday 11/29 until today 12/1. She is excused from work during that time period.    Please contact me for questions or concerns.      Sincerely,        Theron Ramesh MD

## 2023-12-01 NOTE — PROGRESS NOTES
"  Assessment & Plan     Acute cough  Patient presenting with 4 days of nonproductive cough and feeling of mucous being lodged in her chest, sore throat and dry throat that started after initially being sick during Thanksgiving, improving, and worsening again.  Most likely diagnosis is upper and lower respiratory tract infection due to congestion and cough without chest pain, fever, or shortness of breath and clear lungs with good air movement. This pattern indicates low likelihood for bacterial pneumonia.  Patient unsure if she has allergies but the acute onset of her symptoms makes this an unlikely cause of this specific episode.  Sore throat is mild and not accompanied by fever or by white lesions in back of throat or on tonsils making strep throat unlikely.  Ordered viral panel as below and asked her to mask and social distance until the results return.  Provided guidance for supportive cares, such as guanfacine syrup or NyQuil as she has tried before along with Mucinex to help loosen the mucus in her chest.   - Symptomatic Influenza A/B, RSV, & SARS-CoV2 PCR (COVID-19) Nose         BMI:   Estimated body mass index is 30.31 kg/m  as calculated from the following:    Height as of this encounter: 1.829 m (6').    Weight as of 11/21/23: 101.4 kg (223 lb 8 oz).           No follow-ups on file.    Theron Ramesh MD  [facility] JESUS MANUEL Grace is a 43 year old, presenting for the following health issues:  No chief complaint on file.      HPI     Was sick during thanksgiving, though she was getting better that weekend, then started to feel worse as described below  4 days ago, cough, sore throat, dry throat  Had covid two years ago and throat felt dry like this time  Watery eyes  Sneezing occasionally  Eye pain when moves eyes horizontally  No headache  Felt \"hot\" but had no chills  Did not take temp  No chest pain or pressure, just congestion  Cough is non-productive, feels that " phlegm is built up in lungs  Has not taken anything for these symptoms  Appetite only slightly less than usual  Drinking about 30 oz water per day   Sleeping under a ceiling fan makes symptoms worse, specifically sore throat and dry throat  Does snore too  Fatigued to where she is sleeping a lot      Usually takes nyquil, has felt like this works in the past         Review of Systems         Objective    LMP 06/08/2023 (Approximate)   There is no height or weight on file to calculate BMI.  Physical Exam  Constitutional:       Appearance: Normal appearance.   HENT:      Nose: Congestion present.   Cardiovascular:      Rate and Rhythm: Normal rate and regular rhythm.      Heart sounds: Normal heart sounds. No murmur heard.     No friction rub. No gallop.   Pulmonary:      Effort: Pulmonary effort is normal. No respiratory distress.      Breath sounds: Normal breath sounds. No wheezing.   Abdominal:      General: Abdomen is flat. There is no distension.      Palpations: Abdomen is soft.      Tenderness: There is no abdominal tenderness. There is no guarding.   Musculoskeletal:         General: Normal range of motion.   Neurological:      Mental Status: She is alert.

## 2023-12-01 NOTE — PATIENT INSTRUCTIONS
Patient Education   Here is the plan from today's visit        1. Acute cough  - Symptomatic Influenza A/B, RSV, & SARS-CoV2 PCR (COVID-19) Nose    Please call or return to clinic if your symptoms don't go away.    If you have any of the following, please go to the emergency room.  -Shortness of breath or Chest pain  -Fever  -Fatigue    Things you can do to feel better:  -Nyquil (during night)  -Mucinex  -Guanfaisen cough syrup (during day)  -Natural honey  -Chamomile tea  -Rest for next few days and get plenty of sleep    Follow up plan  No follow-ups on file.    Thank you for coming to [facility] today.  Lab Testing:  **If you had lab testing today and your results are reassuring or normal they will be mailed to you or sent through [employer] within 7 days.   **If the lab tests need quick action we will call you with the results.  **If you are having labs done on a different day, please call [phone] to schedule at [facility] or [phone] for other [facility] Outpatient Lab locations. Labs do not offer walk-in appointments.  The phone number we will call with results is # [phone] (home) . If this is not the best number please call our clinic and change the number.  Medication Refills:  If you need any refills please call your pharmacy and they will contact us.   If you need to  your refill at a new pharmacy, please contact the new pharmacy directly. The new pharmacy will help you get your medications transferred faster.   Scheduling:  If you have any concerns about today's visit or wish to schedule another appointment please call our office during normal business hours [phone] (8-5:00 M-F). If you can no longer make a scheduled visit, please cancel via [employer] or call us to cancel.   If a referral was made to an [facility] specialty provider and you do not get a call from central scheduling, please refer to directions on your visit summary or call our office during normal  business hours for assistance.   If a Mammogram was ordered for you at the Breast Center call [phone] to schedule or change your appointment.  If you had an XRay/CT/Ultrasound/MRI ordered the number is [phone] to schedule or change your radiology appointment.   [facility] has limited ultrasound appointments available on Wednesdays, if you would like your ultrasound at [facility], please call [phone] to schedule.   Medical Concerns:  If you have urgent medical concerns please call [phone] at any time of the day.    Theron Ramesh MD

## 2023-12-29 ENCOUNTER — OFFICE VISIT (OUTPATIENT)
Dept: FAMILY MEDICINE | Facility: CLINIC | Age: 43
End: 2023-12-29
Payer: COMMERCIAL

## 2023-12-29 VITALS
BODY MASS INDEX: 30.03 KG/M2 | RESPIRATION RATE: 17 BRPM | SYSTOLIC BLOOD PRESSURE: 129 MMHG | HEART RATE: 80 BPM | OXYGEN SATURATION: 96 % | WEIGHT: 221.4 LBS | DIASTOLIC BLOOD PRESSURE: 83 MMHG

## 2023-12-29 DIAGNOSIS — N94.10 DYSPAREUNIA, FEMALE: ICD-10-CM

## 2023-12-29 DIAGNOSIS — N89.8 VAGINAL DISCHARGE: Primary | ICD-10-CM

## 2023-12-29 LAB
CLUE CELLS: PRESENT
TRICHOMONAS, WET PREP: ABNORMAL
WBC'S/HIGH POWER FIELD, WET PREP: ABNORMAL
YEAST, WET PREP: ABNORMAL

## 2023-12-29 PROCEDURE — 87491 CHLMYD TRACH DNA AMP PROBE: CPT

## 2023-12-29 PROCEDURE — 87210 SMEAR WET MOUNT SALINE/INK: CPT

## 2023-12-29 PROCEDURE — 87591 N.GONORRHOEAE DNA AMP PROB: CPT

## 2023-12-29 PROCEDURE — 99214 OFFICE O/P EST MOD 30 MIN: CPT | Mod: GC

## 2023-12-29 ASSESSMENT — PATIENT HEALTH QUESTIONNAIRE - PHQ9: SUM OF ALL RESPONSES TO PHQ QUESTIONS 1-9: 8

## 2023-12-29 NOTE — LETTER
[address]  [address]  [facility] [address]  Phone: [phone]  Fax: [phone]    Date: December 29, 2023      Lesly Herrera  [address]  [facility] [address]        To Whom It May Concern:    Lesly Herrera was seen at the [facility] on Dec 29, 2023 by JOHN PAUL GRAFF for evaluation. Please excuse her absence this morning until 3 pm today.     If you have any questions, please feel free to call our office.    Sincerely,    John Paul Graff MD

## 2023-12-29 NOTE — PATIENT INSTRUCTIONS
.Patient Education   Here is the plan from today's visit    1. Vaginal discharge  2. Dyspareunia, female  Your symptoms were consistent with vaginitis. We performed a vagina and speculum exam which was normal and shows good healing after your procedure. It is unlikely your symptoms are due to your hysterectomy procedure in October. We obtained vaginal swabs to test for possible bacterial vaginosis vs yeast, but also other STIs which are common to cause these symptoms. You do not need to be tested for HPV because of the hysterectomy, but because of the surgery, your symptoms may also be from pelvic floor dysfunction. If the tests return back positive, we will communicate treatment plan by [facility] and offer prescription. If negative, we will offer pelvic floor therapy referral.  - Wet preparation - Clinic Collect  - Chlamydia trachomatis/Neisseria gonorrhoeae by PCR - Clinic Collect      Please call or return to clinic if your symptoms don't go away.    Follow up plan  No follow-ups on file.    Thank you for coming to [facility] today.  Lab Testing:  **If you had lab testing today and your results are reassuring or normal they will be mailed to you or sent through [facility] within 7 days.   **If the lab tests need quick action we will call you with the results.  **If you are having labs done on a different day, please call [phone] to schedule at [facility] or [phone] for other [facility] Outpatient Lab locations. Labs do not offer walk-in appointments.  The phone number we will call with results is # [phone] (home) . If this is not the best number please call our clinic and change the number.  Medication Refills:  If you need any refills please call your pharmacy and they will contact us.   If you need to  your refill at a new pharmacy, please contact the new pharmacy directly. The new pharmacy will help you get your medications transferred faster.   Scheduling:  If you have any  concerns about today's visit or wish to schedule another appointment please call our office during normal business hours [phone] (8-5:00 M-F). If you can no longer make a scheduled visit, please cancel via Friend[employer] or call us to cancel.   If a referral was made to an [facility] specialty provider and you do not get a call from central scheduling, please refer to directions on your visit summary or call our office during normal business hours for assistance.   If a Mammogram was ordered for you at the Breast Center call [phone] to schedule or change your appointment.  If you had an XRay/CT/Ultrasound/MRI ordered the number is [phone] to schedule or change your radiology appointment.   [facility] has limited ultrasound appointments available on Wednesdays, if you would like your ultrasound at [facility], please call [phone] to schedule.   Medical Concerns:  If you have urgent medical concerns please call [phone] at any time of the day.    John Paul Graff MD

## 2023-12-29 NOTE — PROGRESS NOTES
Assessment & Plan     Vaginal discharge  Dyspareunia, female  Presents with acute signs and symptoms consistent with vaginitis vs cervicitis in setting of possible non monogamous partner. Differential of candida vs trich vs bacterial vaginosis vs NG vs GC. Dyspareunia itself could be post op complication from pelvic floor dysfunction. Speculum exam is reassuring against post operative complication from hysterectomy, and surgical history and exam is reassuring against need to test for cervical cancer. Will obtain Wet prep and NG/GC PCR with results and potential treatment communicated to patient per MyChart. Patient advised to follow up if treatment does not improve symptoms. Consider offering referral for PT regarding pelvic floor therapy.   - Wet preparation - Clinic Collect  - Chlamydia trachomatis/Neisseria gonorrhoeae by PCR - Clinic Collect  - metroNIDAZOLE (FLAGYL) 500 MG tablet; Take 1 tablet (500 mg) by mouth 2 times daily for 7 days       BMI:   Estimated body mass index is 30.03 kg/m  as calculated from the following:    Height as of 12/1/23: 1.829 m (6').    Weight as of this encounter: 100.4 kg (221 lb 6.4 oz).     Return if symptoms worsen or fail to improve, for Follow up.    John Paul Graff MD  [facility] JESUS MANUEL Grace is a 43 year old, presenting for the following health issues:  Clinic Care Coordination - Initial (Pt here for vaginal discharge and pain during intercourse)      12/29/2023    11:14 AM   Additional Questions   Roomed by Doua   Accompanied by Self         12/29/2023    11:14 AM   Patient Reported Additional Medications   Patient reports taking the following new medications n/a       HPI     Reports that since last week, noticing:  -watery discharge and a lot of it. At first white and now thin. No clumps. Not urine appearing  -Has an odor that is usually not there. Fishy. Even if showering, returns  -Pain with sex: penetration.  Never had before  -Also has  oral sex received    -No new sexual partners, same partner over past 2 years on and off    -No bleeding after sexual intercourse.     -Had pelvic pain after sex 4-5 days. Dull- stingy, lasted for a couple hours    -LOLY with removal of fallopian tubes, ovaries intact -- no complications with procedure. Unsure if symptoms are related. Procedure 10/11. Endorses fatigue since with anemia 2/2 post op blood loss and demanding schedule.     -Denies fevers, chills,   -Chest pain, shortness of breath, palpations, cough  -nausea, vomiting, diarrhea, constipation  -Denies rashes, new arthralgias (right ankle has been bothering her for about a year on and off)  -Denies dysuria, urgency, frequency, hesitation, hematuria  -No vaginal lesions  -No douching    Feels like bacterial vaginosis, had before a long time ago. Similar symptoms.       Review of Systems   Constitutional, HEENT, cardiovascular, pulmonary, gi and gu systems are negative, except as otherwise noted.      Objective    /83 (BP Location: Left arm, Patient Position: Sitting, Cuff Size: Adult Large)   Pulse 80   Resp 17   Wt 100.4 kg (221 lb 6.4 oz)   LMP 06/08/2023 (Approximate)   SpO2 96%   BMI 30.03 kg/m    Body mass index is 30.03 kg/m .  Physical Exam   GENERAL: healthy, alert and no distress  RESP: lungs clear to auscultation - no rales, rhonchi or wheezes  CV: regular rate and rhythm, no murmur, no peripheral edema and peripheral pulses strong  ABDOMEN: soft, nontender, no hepatosplenomegaly, no masses and bowel sounds normal   (female): normal female external genitalia, normal urethral meatus, vaginal mucosa, suture in place at distal vagina without granulation tissue, no masses or polyps, thin white to slightly yellow discharge, no malodor appreciated  MS: no gross musculoskeletal defects noted, no edema  SKIN: no suspicious lesions or rashes  NEURO: Normal strength and tone, mentation intact and speech normal    Results for orders placed or  performed in visit on 12/29/23   Wet preparation - Clinic Collect     Status: Abnormal    Specimen: Vagina; Swab   Result Value Ref Range    Trichomonas Absent Absent    Yeast Absent Absent    Clue Cells Present (A) Absent    WBCs/high power field 3+ (A) None    Narrative    pH: >5.0; Whiff Test: Negative.   Chlamydia trachomatis/Neisseria gonorrhoeae by PCR - Clinic Collect     Status: Normal    Specimen: Vagina; Swab   Result Value Ref Range    Chlamydia Trachomatis Negative Negative    Neisseria gonorrhoeae Negative Negative       ----- Service Performed and Documented by Resident or Fellow ------

## 2023-12-29 NOTE — PROGRESS NOTES
Preceptor Attestation:  Patient seen and evaluated in person. I discussed the patient with the resident. I have verified the content of the note, which accurately reflects my assessment of the patient and the plan of care.   Supervising Physician:  Corinna Oorzco DO

## 2023-12-30 LAB
C TRACH DNA SPEC QL PROBE+SIG AMP: NEGATIVE
N GONORRHOEA DNA SPEC QL NAA+PROBE: NEGATIVE

## 2023-12-30 RX ORDER — METRONIDAZOLE 500 MG/1
500 TABLET ORAL 2 TIMES DAILY
Qty: 14 TABLET | Refills: 0 | Status: SHIPPED | OUTPATIENT
Start: 2023-12-30 | End: 2024-01-06

## 2024-01-16 ENCOUNTER — HOSPITAL ENCOUNTER (EMERGENCY)
Facility: CLINIC | Age: 44
Discharge: HOME | End: 2024-01-16
Attending: EMERGENCY MEDICINE | Admitting: EMERGENCY MEDICINE
Payer: COMMERCIAL

## 2024-01-16 VITALS
HEART RATE: 99 BPM | RESPIRATION RATE: 21 BRPM | DIASTOLIC BLOOD PRESSURE: 95 MMHG | BODY MASS INDEX: 30.48 KG/M2 | HEIGHT: 72 IN | OXYGEN SATURATION: 93 % | SYSTOLIC BLOOD PRESSURE: 139 MMHG | TEMPERATURE: 97.6 F | WEIGHT: 225 LBS

## 2024-01-16 DIAGNOSIS — R07.9 CHEST PAIN, UNSPECIFIED TYPE: ICD-10-CM

## 2024-01-16 LAB
ALBUMIN SERPL BCG-MCNC: 4.1 G/DL (ref 3.5–5.2)
ALP SERPL-CCNC: 95 U/L (ref 40–150)
ALT SERPL W P-5'-P-CCNC: 25 U/L (ref 0–50)
ANION GAP SERPL CALCULATED.3IONS-SCNC: 8 MMOL/L (ref 7–15)
AST SERPL W P-5'-P-CCNC: 20 U/L (ref 0–45)
ATRIAL RATE - MUSE: 79 BPM
BASOPHILS # BLD AUTO: 0.1 10E3/UL (ref 0–0.2)
BASOPHILS NFR BLD AUTO: 1 %
BILIRUB SERPL-MCNC: 0.2 MG/DL
BUN SERPL-MCNC: 8.6 MG/DL (ref 6–20)
CALCIUM SERPL-MCNC: 9.3 MG/DL (ref 8.6–10)
CHLORIDE SERPL-SCNC: 101 MMOL/L (ref 98–107)
CREAT SERPL-MCNC: 0.68 MG/DL (ref 0.51–0.95)
D DIMER PPP FEU-MCNC: 0.28 UG/ML FEU (ref 0–0.5)
DEPRECATED HCO3 PLAS-SCNC: 24 MMOL/L (ref 22–29)
DIASTOLIC BLOOD PRESSURE - MUSE: NORMAL MMHG
EGFRCR SERPLBLD CKD-EPI 2021: >90 ML/MIN/1.73M2
EOSINOPHIL # BLD AUTO: 0 10E3/UL (ref 0–0.7)
EOSINOPHIL NFR BLD AUTO: 0 %
ERYTHROCYTE [DISTWIDTH] IN BLOOD BY AUTOMATED COUNT: 15.7 % (ref 10–15)
GLUCOSE SERPL-MCNC: 144 MG/DL (ref 70–99)
HCT VFR BLD AUTO: 42.1 % (ref 35–47)
HGB BLD-MCNC: 13.9 G/DL (ref 11.7–15.7)
IMM GRANULOCYTES # BLD: 0 10E3/UL
IMM GRANULOCYTES NFR BLD: 0 %
INTERPRETATION ECG - MUSE: NORMAL
LYMPHOCYTES # BLD AUTO: 2.1 10E3/UL (ref 0.8–5.3)
LYMPHOCYTES NFR BLD AUTO: 22 %
MCH RBC QN AUTO: 27.8 PG (ref 26.5–33)
MCHC RBC AUTO-ENTMCNC: 33 G/DL (ref 31.5–36.5)
MCV RBC AUTO: 84 FL (ref 78–100)
MONOCYTES # BLD AUTO: 0.8 10E3/UL (ref 0–1.3)
MONOCYTES NFR BLD AUTO: 8 %
NEUTROPHILS # BLD AUTO: 6.8 10E3/UL (ref 1.6–8.3)
NEUTROPHILS NFR BLD AUTO: 69 %
NRBC # BLD AUTO: 0 10E3/UL
NRBC BLD AUTO-RTO: 0 /100
P AXIS - MUSE: 11 DEGREES
PLATELET # BLD AUTO: 330 10E3/UL (ref 150–450)
POTASSIUM SERPL-SCNC: 3.7 MMOL/L (ref 3.4–5.3)
PR INTERVAL - MUSE: 138 MS
PROT SERPL-MCNC: 7.4 G/DL (ref 6.4–8.3)
QRS DURATION - MUSE: 86 MS
QT - MUSE: 396 MS
QTC - MUSE: 454 MS
R AXIS - MUSE: 25 DEGREES
RBC # BLD AUTO: 5 10E6/UL (ref 3.8–5.2)
SODIUM SERPL-SCNC: 133 MMOL/L (ref 135–145)
SYSTOLIC BLOOD PRESSURE - MUSE: NORMAL MMHG
T AXIS - MUSE: 21 DEGREES
TROPONIN T SERPL HS-MCNC: <6 NG/L
VENTRICULAR RATE- MUSE: 79 BPM
WBC # BLD AUTO: 9.8 10E3/UL (ref 4–11)

## 2024-01-16 PROCEDURE — 99284 EMERGENCY DEPT VISIT MOD MDM: CPT | Mod: 25 | Performed by: EMERGENCY MEDICINE

## 2024-01-16 PROCEDURE — 85025 COMPLETE CBC W/AUTO DIFF WBC: CPT | Performed by: EMERGENCY MEDICINE

## 2024-01-16 PROCEDURE — 85379 FIBRIN DEGRADATION QUANT: CPT | Performed by: EMERGENCY MEDICINE

## 2024-01-16 PROCEDURE — 80053 COMPREHEN METABOLIC PANEL: CPT | Performed by: EMERGENCY MEDICINE

## 2024-01-16 PROCEDURE — 93005 ELECTROCARDIOGRAM TRACING: CPT | Performed by: EMERGENCY MEDICINE

## 2024-01-16 PROCEDURE — 84484 ASSAY OF TROPONIN QUANT: CPT | Performed by: EMERGENCY MEDICINE

## 2024-01-16 PROCEDURE — 93010 ELECTROCARDIOGRAM REPORT: CPT | Performed by: EMERGENCY MEDICINE

## 2024-01-16 PROCEDURE — 36415 COLL VENOUS BLD VENIPUNCTURE: CPT | Performed by: EMERGENCY MEDICINE

## 2024-01-16 PROCEDURE — 99284 EMERGENCY DEPT VISIT MOD MDM: CPT | Performed by: EMERGENCY MEDICINE

## 2024-01-16 ASSESSMENT — ACTIVITIES OF DAILY LIVING (ADL): ADLS_ACUITY_SCORE: 35

## 2024-01-17 ENCOUNTER — OFFICE VISIT (OUTPATIENT)
Dept: FAMILY MEDICINE | Facility: CLINIC | Age: 44
End: 2024-01-17
Payer: COMMERCIAL

## 2024-01-17 VITALS
DIASTOLIC BLOOD PRESSURE: 92 MMHG | RESPIRATION RATE: 18 BRPM | SYSTOLIC BLOOD PRESSURE: 143 MMHG | BODY MASS INDEX: 30.92 KG/M2 | OXYGEN SATURATION: 98 % | TEMPERATURE: 98.3 F | HEART RATE: 73 BPM | WEIGHT: 228 LBS

## 2024-01-17 DIAGNOSIS — F41.0 PANIC ATTACK: Primary | ICD-10-CM

## 2024-01-17 DIAGNOSIS — Z13.9 ENCOUNTER FOR SCREENING INVOLVING SOCIAL DETERMINANTS OF HEALTH (SDOH): ICD-10-CM

## 2024-01-17 DIAGNOSIS — F32.A DEPRESSION, UNSPECIFIED DEPRESSION TYPE: ICD-10-CM

## 2024-01-17 DIAGNOSIS — R53.83 FATIGUE, UNSPECIFIED TYPE: ICD-10-CM

## 2024-01-17 PROCEDURE — 99214 OFFICE O/P EST MOD 30 MIN: CPT | Mod: GC

## 2024-01-17 RX ORDER — HYDROXYZINE HYDROCHLORIDE 25 MG/1
25-50 TABLET, FILM COATED ORAL 3 TIMES DAILY PRN
Qty: 45 TABLET | Refills: 1 | Status: SHIPPED | OUTPATIENT
Start: 2024-01-17 | End: 2024-03-04

## 2024-01-17 NOTE — LETTER
Date: Jan 17, 2024    TO WHOM IT MAY CONCERN:    Patient Lesly Herrera was seen at my clinic on the afternoon of Jan 17, 2024.  Please excuse her absence from work today and until 1/22/24 given her current condition and treatment plan. She is due for follow up on 01/29/24 so she will need that day off.     John Paul Graff MD

## 2024-01-17 NOTE — ED PROVIDER NOTES
[facility] EMERGENCY DEPARTMENT ([facility])    1/16/24      ED PROVIDER NOTE      History     Chief Complaint   Patient presents with    Chest Pain     On and off today.  Patient reports being on meds for anxiety and has CP with panic attacks     HPI  Lesly Herrera is a 43 year old female with a past medical history significant for RUBEN, MDD, and tobacco use disorder who presents to the ED for evaluation of intermittent chest pain.  Patient states that she has been experiencing chest pain along with panic attacks throughout the course of the day, which both had onset around 9 AM.  Notes that the chest pain is intermittent, and is described as a sharp ache. The pain presents in intervals lasting a couple of minutes, however the longest episode had persisted for about half an hour.      In addition, patient reports experiencing lightheadedness and intermittent shortness of breath.  She notes that the symptoms tend to worsen in times of increased stress.  Patient states that she has experienced similar symptoms in the past with her anxiety, but that the symptoms have never persisted for this long.  Denies lower extremity pain or numbness, but mentions the presence of a lump to her left thigh.  However, she believes that the lump had resulted from a bruise that she acquired about a week ago after a mechanical fall. She denies recent heavy lifting.  No fever or chills.  The pain does not worsen with twisting or movement.    Past Medical History  Past Medical History:   Diagnosis Date    Anemia     Fibroid     History of robot-assisted laparoscopic hysterectomy 10/10/2023     Past Surgical History:   Procedure Laterality Date    DAVINCI HYSTERECTOMY TOTAL, BILATERAL SALPINGO-OOPHORECTOMY, COMBINED Bilateral 10/10/2023    Procedure: ROBOTIC ASSISTED TOTAL LAPAROSCOPIC HYSTERECTOMY, BILATERAL SALPINGECTOMY, CYSTOSCOPY;  Surgeon: Titus Asif MD;  Location:  OR - Path Benign, Fibroids    wisdom teeth        Multiple Vitamins-Minerals (MULTIVITAMIN ADULT PO)      No Known Allergies  Family History  Family History   Problem Relation Age of Onset    Diabetes Mother     Hypertension Mother     Alcohol/Drug Mother     Psychotic Disorder Sister         schizophrenia    Breast Cancer Paternal Aunt      Social History   Social History     Tobacco Use    Smoking status: Some Days     Packs/day: .05     Types: Cigarettes     Last attempt to quit: 6/3/2019     Years since quittin.6     Passive exposure: Current    Smokeless tobacco: Never   Vaping Use    Vaping Use: Never used   Substance Use Topics    Alcohol use: Yes     Comment: 6 drinks per week average    Drug use: No      Past medical history, past surgical history, medications, allergies, family history, and social history were reviewed with the patient. No additional pertinent items.      A medically appropriate review of systems was performed with pertinent positives and negatives noted in the HPI, and all other systems negative.    Physical Exam   BP: (!) 169/108  Pulse: 79  Temp: 97.6  F (36.4  C)  Resp: 20  Height: 182.9 cm (6')  Weight: 102.1 kg (225 lb)  SpO2: 98 %  Physical Exam  Vitals and nursing note reviewed.   Constitutional:       General: She is not in acute distress.     Appearance: Normal appearance. She is well-developed.   HENT:      Head: Normocephalic and atraumatic.   Eyes:      General: No scleral icterus.     Conjunctiva/sclera: Conjunctivae normal.   Cardiovascular:      Rate and Rhythm: Normal rate.   Pulmonary:      Effort: Pulmonary effort is normal. No respiratory distress.   Abdominal:      General: Abdomen is flat.   Musculoskeletal:      Cervical back: Normal range of motion and neck supple.   Skin:     General: Skin is warm and dry.      Findings: No rash.   Neurological:      Mental Status: She is alert and oriented to person, place, and time.           ED Course, Procedures, & Data    8:21 PM  The patient was seen and examined by  Lv Smith   in Room ED02.     Procedures            EKG Interpretation:      Interpreted by Lv Smith MD  Time reviewed: per Epic  Symptoms at time of EKG: cp   Rhythm: normal sinus   Rate: normal  Axis: normal  Ectopy: none  Conduction: normal  ST Segments/ T Waves: No ST-T wave changes  Q Waves: none  Comparison to prior: No old EKG available    Clinical Impression: normal EKG                 No results found for any visits on 01/16/24.  Medications - No data to display  Labs Ordered and Resulted from Time of ED Arrival to Time of ED Departure - No data to display  No orders to display          Critical care was not performed.     Medical Decision Making  The patient's presentation was of moderate complexity (an acute illness with systemic symptoms).    The patient's evaluation involved:  ordering and/or review of 3+ test(s) in this encounter (see separate area of note for details)    The patient's management necessitated only low risk treatment.    Assessment & Plan      43 year old female with a past medical history significant for RUBEN, MDD, and tobacco use disorder who presents to the ED for evaluation of intermittent chest pain.  Vital signs stable and afebrile including normal pulse ox at 98% on room air.  EKG obtained which revealed normal sinus rhythm without acute ischemic changes.  IV established, labs sent revealed normal CBC and electrolytes, negative troponin and negative D-dimer.  Plan for discharge home and follow-up with primary care [facility] for further evaluation and care.    I have reviewed the nursing notes. I have reviewed the findings, diagnosis, plan and need for follow up with the patient.    New Prescriptions    No medications on file       Final diagnoses:   Chest pain, unspecified type   I, Flor Oliver, yahaira serving as a trained medical scribe to document services personally performed by Lv Smith MD, based on the provider's statements to me.      I, Lv Smith MD, was  physically present and have reviewed and verified the accuracy of this note documented by Flor Oliver.       Lv Smith MD  [facility] EMERGENCY DEPARTMENT  1/16/2024     Lv Smith MD  01/16/24 [id number]

## 2024-01-17 NOTE — COMMUNITY RESOURCES LIST (ENGLISH)
01/17/2024   [facility]  N/A  For questions about this resource list or additional care needs, please contact your primary care clinic or care manager.  Phone: [phone]   Email: N/A   Address: [address] [place] [id number]   Hours: N/A        Financial Stability       Rent and mortgage payment assistance  1  [facility] Distance: 2.48 miles      In-Person, Phone/Virtual   [address] [place] [address]  Language: English, Micronesian  Hours: Mon - Fri 9:00 AM - 4:30 PM  Fees: Free   Phone: (305) 709-1075 Ext.113 Email: info@[email or web address].Talentag Website: https://[email or web address].Talentag     2  [facility] - Rent and mortgage payment assistance Distance: 4.39 miles      In-Person, Phone/Virtual   [address] [place] [address]  Language: English  Hours: Mon - Thu 9:00 AM - 3:00 PM  Fees: Free   Phone: (693) 764-7952 Email: [email or web address]@[email or web address].org Website: http://[email or web address]/care-ministries/          Food and Nutrition       Food pantry  3  [facility] [employer] Distance: 1.95 miles      In-Person   [address] [place] [address]  Language: English  Hours: Mon - Sun Open 24 Hours  Fees: Free   Phone: (340) 776-8882 Email: [email or web address] Website: http://[email or web address]     4  [facility] Distance: 1.96 miles      Pickup   [address] [place] [address]  Language: English  Hours: Tue 5:00 PM - 7:00 PM  Fees: Free   Phone: (809) 191-3091 Email: info@[employer].org Website: https://www.[employer].org/     SNAP application assistance  5  [facility] Distance: 2.48 miles      In-Person, Phone/Virtual   [address] [place] [address]  Language: English, Micronesian  Hours: Mon - Fri 9:00 AM - 4:30 PM  Fees: Free   Phone: (479) 138-4541 Ext.113 Email: info@[employer].org Website: https://[employer].org     6  [facility]  [employer] Distance: 5.84 miles      In-Person   [address] [place] [address]  Language: English, Danish  Hours: Mon - Fri 8:30 AM - 5:00 PM  Fees: Free   Phone: (855) 572-5864 Email: info@[employer].org Website: http://www.[employer]/     Soup kitchen or free meals  7  [facility] Distance: 0.94 miles      Pickup   [address] [place] [address]  Language: English  Hours: Thu 3:00 PM - 6:00 PM  Fees: Free   Phone: (484) 375-1979 Email: deisi@[employer] Website: http://[employer]/     8  [facility] [place] Distance: 1.12 miles      Pickup   [address], [place] [address]  Language: English  Hours: Sat 5:00 PM - 7:00 PM , Sun 5:30 PM - 6:30 PM  Fees: Free   Phone: (330) 368-8320 Email: office@[employer] Website: http://[employer] [facility]/          Important Numbers & Websites       Emergency Services   911  City Services   311  Poison Control   (152) 826-1609  Suicide Prevention Lifeline   (828) 614-2217 (TALK)  Child Abuse Hotline   (478) 232-9384 (4-A-Child)  Sexual Assault Hotline   (439) 309-5403 (HOPE)  National Runaway Safeline   (159) 903-8476 (RUNAWAY)  All-Options Talkline   (491) 882-9046  Substance Abuse Referral   (241) 868-1548 (HELP)

## 2024-01-17 NOTE — PROGRESS NOTES
Assessment & Plan       Panic attack  Depression, unspecified depression type  Fatigue, unspecified type  Presents following an ED visit for chest pain with negative cardiac+ workup. Requested Gabapentin on MyChart prior to that presentation, but this med was discontinued in 3/2023 per EMR so was asked to come in.   Her symptoms are consistent with panic attack and seem to be work related. Differential may also include hypoglycemia from diabetes (prediabetes history, A1c 5.7 9/22/23) vs thyroid disease vs vitamin D deficiency vs anemia. Potential for alcohol use disorder as driving etiology, and avoiding use was discussed during visit.   With concurrent depression, will start 50 mg sertraline daily today and potentially increase dose in 4-6 weeks vs switch to another selective serotonin reuptake inhibitor based on medication tolerance and need for further symptomatic improvement. Will also offer atarax as PRN. Declined request for Xanax given drinking history. Reassured for safety with positive factors. She has an appointment with psychologist on 01/29/24. Provided a safety action plan with crisis numbers on AVS, as well as requested work note to allow for time off until 1/22/24 which is reasonable to reduce stress.   - TSH with free T4 reflex; Future  - Hemoglobin A1c; Future  - Vitamin D Level; Future  - Hemoglobin; Future  - sertraline (ZOLOFT) 50 MG tablet; Take 1 tablet (50 mg) by mouth daily  - hydrOXYzine HCl (ATARAX) 25 MG tablet; Take 1-2 tablets (25-50 mg) by mouth 3 times daily as needed for anxiety    Encounter for screening involving social determinants of health (SDoH)  The patient has screened positive for 4 social determinants of health, housing, food insecurity, smoking and depression. Considering potential these are driving anxiety symptoms or leading to current occupation choice to ensure financial security. The patient appreciates additional resources to help her.   -Offered NowPow eRX on AVS.      BMI  Estimated body mass index is 30.92 kg/m  as calculated from the following:    Height as of 1/16/24: 1.829 m (6').    Weight as of this encounter: 103.4 kg (228 lb).     Recommended follow up with clinic in 2-3 weeks to monitor response to treatment plan, sooner prn      Subjective   Lesly is a 43 year old, presenting for the following health issues:  Chest Pain (Occurred this morning again. Went to ER yesterday night. Unsure what is causing it. Stated possibly from anxiety, stress,  depression ) and Medication Request (Gabapentin request )      1/17/2024     4:07 PM   Additional Questions   Roomed by Melida   Accompanied by self     HPI     The patient reports on and off anxiety for past couple years.   Anxiety symptoms are worrying and hyperventilating, sweaty and nervous. Chest pain before but not like yesterday. Teary eyed like almost crying.   Trigger environment: work lately.   Previous anxious when visiting mothers grave.   -Lost mother 1 year ago and Uncle a 6 months ago who is buried alongside.   Build up fast out of nowhere and takes about 15-30 minutes to improve. Mediatation helps and breathing techniques.   Anxious about next anxiety episode.     Last month and a half, it has been a lot of stress.   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   Has trouble concentrating  Can't sleep sometimes and ongoing fatigue.   Feels increased sadness and lost interest in normal activities.   Endorses worthlessness, guilt, shame.     Has a prior diagnosis of depression.     Made appt with mental health but not able to get in until Jan 28th.     Given Gabapentin dose 100 mg TID, used as needed. Ran out.     Endorses diarrhea, palpitations, sweatiness.     ROS Neg: fevers, chills, unexplained weight loss, increased heat or cold intolerance, constipation, dry skin. No SI, HI or hallucinations.     Uses alcohol: 6 drink a week usually on weekend. Beer is choice.    No current substances or over  the counter supplements.     Previously given sertraline but made her lose appetite for awhile. 10 years ago.   Previously given Xanax which worked for her.     Social History     Tobacco Use    Smoking status: Some Days     Packs/day: .05     Types: Cigarettes     Last attempt to quit: 6/3/2019     Years since quittin.6     Passive exposure: Current    Smokeless tobacco: Never   Vaping Use    Vaping Use: Never used   Substance Use Topics    Alcohol use: Yes     Comment: 6 drinks per week average    Drug use: No         3/26/2023     2:54 PM 2023     2:07 PM 2023    11:16 AM   PHQ   PHQ-9 Total Score 8 7 8   Q9: Thoughts of better off dead/self-harm past 2 weeks Not at all Not at all Not at all         2019     5:12 PM 2020     8:22 AM 3/26/2023     2:54 PM   RUBEN-7 SCORE   Total Score 10 3 4           Objective    BP (!) 143/92   Pulse 73   Temp 98.3  F (36.8  C) (Oral)   Resp 18   Wt 103.4 kg (228 lb)   LMP 2023 (Approximate)   SpO2 98%   BMI 30.92 kg/m    Body mass index is 30.92 kg/m .    Review of Systems  Constitutional, HEENT, cardiovascular, pulmonary, gi and gu systems are negative, except as otherwise noted.  Physical Exam   GENERAL: healthy appearing, alert and no distress  EYES: Eyes grossly normal to inspection, conjunctivae and sclerae normal  NECK: no adenopathy, no asymmetry, masses, thyromegaly or thyroid tenderness  RESP: lungs clear to auscultation - no rales, rhonchi or wheezes  CV: regular rate and rhythm, no murmur, click or rub, no peripheral edema  ABDOMEN: soft, nontender, no hepatosplenomegaly, no masses and bowel sounds normal  MS: no gross musculoskeletal defects noted, no edema  SKIN: no suspicious lesions or rashes  NEURO: Normal strength and tone, mentation intact and speech normal  Psych: pleasant, good eye contact, tearful to normal affect, normal thought content and judgement, not tracking to internal stimuli    No results found for any visits on  01/17/24.        Signed Electronically by: John Paul Graff MD

## 2024-01-17 NOTE — ED NOTES
Pt given discharge paperwork and instructions. All questions answered. VSS. A&O x4. Ambulatory with steady gait

## 2024-01-17 NOTE — ED TRIAGE NOTES
Patient reports intermittent midsternal chest pain that feels stabbing and aching.  Patient tearful in triage and states she has had this before and was diagnosed with panic attacks.  Patient concerned it could be real chest pain and wanted to be evaluated.     Triage Assessment (Adult)       Row Name 01/16/24 1938          Triage Assessment    Airway WDL WDL        Respiratory WDL    Respiratory WDL WDL        Skin Circulation/Temperature WDL    Skin Circulation/Temperature WDL WDL        Cardiac WDL    Cardiac WDL X;chest pain        Chest Pain Assessment    Chest Pain Location midsternal     Character sharp;aching     Precipitating Factors emotional stress     Alleviating Factors rest     Associated Signs/Symptoms anxiety        Peripheral/Neurovascular WDL    Peripheral Neurovascular WDL WDL        Cognitive/Neuro/Behavioral WDL    Cognitive/Neuro/Behavioral WDL X;mood/behavior     Mood/Behavior anxious

## 2024-01-17 NOTE — PATIENT INSTRUCTIONS
Patient Education   Here is the plan from today's visit    1. Encounter for screening involving social determinants of health (SDoH)  Health begins with a helpful community. We have a list of clinic recognized sites that may boost someone when they need it most.     2. Panic attack  3. Depression, unspecified depression type  4. Fatigue, unspecified type  We will look at lab work which may cause some of your symptoms, but we will treat your anxiety and depression now. Consider surrounding yourself with friends and family when you are most sad, avoiding alcohol as it can make symptoms worse, and have friends and family remove potential weapons and excess medications if you are concerned for your safety. The medications we gave you will start to work best together in 2 weeks time and come to full potency in 6 weeks time. Please follow up with your therapist and our clinic in 2-3 weeks so we can see how you are responding. Sometimes we will increase medication dose after a while. Atarax may make you feel sedated so please know how this effects you before driving. It is important to take Sertraline every day and not as needed, and do not discontinue this medication without talking to a medical professional.   - TSH with free T4 reflex; Future  - Hemoglobin A1c; Future  - Vitamin D Level; Future  - sertraline (ZOLOFT) 50 MG tablet; Take 1 tablet (50 mg) by mouth daily  Dispense: 45 tablet; Refill: 0  - hydrOXYzine HCl (ATARAX) 25 MG tablet; Take 1-2 tablets (25-50 mg) by mouth 3 times daily as needed for anxiety  Dispense: 45 tablet; Refill: 1  - Hemoglobin; Future      Your emotional health is important to us!  If you feel that you may hurt yourself or others, please seek help through the hospital ER, or 9-1-1.  You may also call [place] Crisis Connection, toll-free, at 1.179.382.8428 for immediate support.      The National Suicide Prevention Lifeline at  401 or 1-467.734.9426 can be reached 24/7.     [employer]  "can be reached at 145-012-0386.   For LGBT youth (ages 24 and younger) contemplating suicide, the [employer] Lifeline can be reached at 1-435.575.1484.  [place] Program: Text \"start\" to 672 500      Please call or return to clinic if your symptoms don't go away.    Follow up plan  Return in about 3 weeks (around 2/7/2024), or if symptoms worsen or fail to improve, for Follow up.    Thank you for coming to [facility] today.  Lab Testing:  **If you had lab testing today and your results are reassuring or normal they will be mailed to you or sent through ENDYMION within 7 days.   **If the lab tests need quick action we will call you with the results.  **If you are having labs done on a different day, please call [phone] to schedule at [facility] or [phone] for other [facility] Outpatient Lab locations. Labs do not offer walk-in appointments.  The phone number we will call with results is # [phone] (home) . If this is not the best number please call our clinic and change the number.  Medication Refills:  If you need any refills please call your pharmacy and they will contact us.   If you need to  your refill at a new pharmacy, please contact the new pharmacy directly. The new pharmacy will help you get your medications transferred faster.   Scheduling:  If you have any concerns about today's visit or wish to schedule another appointment please call our office during normal business hours [phone] (8-5:00 M-F). If you can no longer make a scheduled visit, please cancel via ENDYMION or call us to cancel.   If a referral was made to an [facility] specialty provider and you do not get a call from central scheduling, please refer to directions on your visit summary or call our office during normal business hours for assistance.   If a Mammogram was ordered for you at the Breast Center call [phone] to schedule or change your appointment.  If you had an XRay/CT/Ultrasound/MRI " ordered the number is [phone] to schedule or change your radiology appointment.   [facility] has limited ultrasound appointments available on Wednesdays, if you would like your ultrasound at [facility], please call [phone] to schedule.   Medical Concerns:  If you have urgent medical concerns please call [phone] at any time of the day.    John Paul Graff MD

## 2024-01-18 ENCOUNTER — LAB (OUTPATIENT)
Dept: LAB | Facility: CLINIC | Age: 44
End: 2024-01-18
Payer: COMMERCIAL

## 2024-01-18 DIAGNOSIS — F41.0 PANIC ATTACK: ICD-10-CM

## 2024-01-18 DIAGNOSIS — R53.83 FATIGUE, UNSPECIFIED TYPE: ICD-10-CM

## 2024-01-18 LAB
HBA1C MFR BLD: 5.7 % (ref 0–5.6)
HGB BLD-MCNC: 13.8 G/DL (ref 11.7–15.7)

## 2024-01-18 PROCEDURE — 82306 VITAMIN D 25 HYDROXY: CPT

## 2024-01-18 PROCEDURE — 85018 HEMOGLOBIN: CPT

## 2024-01-18 PROCEDURE — 83036 HEMOGLOBIN GLYCOSYLATED A1C: CPT

## 2024-01-18 PROCEDURE — 84443 ASSAY THYROID STIM HORMONE: CPT

## 2024-01-18 PROCEDURE — 36415 COLL VENOUS BLD VENIPUNCTURE: CPT

## 2024-01-19 ENCOUNTER — OFFICE VISIT (OUTPATIENT)
Dept: FAMILY MEDICINE | Facility: CLINIC | Age: 44
End: 2024-01-19
Payer: COMMERCIAL

## 2024-01-19 VITALS
DIASTOLIC BLOOD PRESSURE: 95 MMHG | TEMPERATURE: 97.7 F | BODY MASS INDEX: 30.92 KG/M2 | RESPIRATION RATE: 12 BRPM | SYSTOLIC BLOOD PRESSURE: 138 MMHG | HEART RATE: 93 BPM | WEIGHT: 228 LBS | OXYGEN SATURATION: 98 %

## 2024-01-19 DIAGNOSIS — F32.A ANXIETY AND DEPRESSION: ICD-10-CM

## 2024-01-19 DIAGNOSIS — F41.0 PANIC ATTACK: Primary | ICD-10-CM

## 2024-01-19 DIAGNOSIS — F41.9 ANXIETY AND DEPRESSION: ICD-10-CM

## 2024-01-19 LAB
TSH SERPL DL<=0.005 MIU/L-ACNC: 0.86 UIU/ML (ref 0.3–4.2)
VIT D+METAB SERPL-MCNC: 21 NG/ML (ref 20–50)

## 2024-01-19 PROCEDURE — 99214 OFFICE O/P EST MOD 30 MIN: CPT | Mod: GC

## 2024-01-20 NOTE — PROGRESS NOTES
Assessment & Plan     Panic Attack  Anxiety and depression  The patient has significant work related stress which is driving her mood symptoms. Offered NowPow eRX at last visit given food security and housing stability. Suspect lack of resources, desired education had led to undesirable employment environment and worsening anxiety and depression symptoms leading to panic attacks. Reasonable to offer FMLA authorization for reduced work load with goal of 12 cases with ongoing therapy and medication optimization, as further work stress will not be conducive for optimal treatment. Anticipate duration will last potentially 6 weeks at a time while undergoing psych follow up which has been scheduled. The patient has a plan to follow up in clinic to reach therapeutic dosing of antianxiolytics and antidepressives. Suspect this process could take around a year time. Forms were signed and faxed with copy placed in medical record.       BMI  Estimated body mass index is 30.92 kg/m  as calculated from the following:    Height as of 1/16/24: 1.829 m (6').    Weight as of this encounter: 103.4 kg (228 lb).     Return in about 2 weeks (around 2/2/2024), or if symptoms worsen or fail to improve, for Follow up.    Meliton Grace is a 43 year old, presenting for the following health issues:  Forms (Pt here for fmla forms.)    HPI   The patient needs assistance with her FMLA form. Comes in with partner who provides some collateral history.     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 She was seen in the Emergency department on 01/16/24 and at [facility]17[facility]. No medical causes to explain symptoms during these visits. She was initiated on sertraline 50 mg daily with PRN 25-50 mg atarax on 01/17/24 with a work note allowing for time off until 01/22/24 to allow for medications to take effect. However, work  related stress is an ongoing anxiety trigger. 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 Per partner, her life has been impacted with worsening fatigue and inability to do anything but sit and space out after work, and vouches for increased workload as they work together and admits the work environment is incredibly stressful for him. Previous supervisor recognized flexibility with goals, but the recent manager does not.     The patient reports that she can do about 12 cases per day with reasonable expectation to do her work duties. A goal of 12 cases per day would be ideal. She would appreciate flexibility with start times for her shifts with a window of 8-9am to 4-530 pm so long as she is able to reach 40 hours per week, as her mood leads to inadequate sleep before some shifts.     Denies SI, HI or hallucinations    Objective    BP (!) 138/95   Pulse 93   Temp 97.7  F (36.5  C)   Resp 12   Wt 103.4 kg (228 lb)   LMP 06/08/2023 (Approximate)   SpO2 98%   BMI 30.92 kg/m    Body mass index is 30.92 kg/m .  Physical Exam   GENERAL: well appearing, well dressed and no distress  NEURO: alert and oriented, mentation intact and speech normal  PSYCH: mentation appears normal, affect tearful to normal, normal thought content and judgement    No results found for any visits on 01/19/24.        Signed Electronically by: John Paul Graff MD

## 2024-01-23 NOTE — PATIENT INSTRUCTIONS
"Patient Education   Here is the plan from today's visit    1. Anxiety and depression  2. Panic attack  We filled out the FMLA form together. The form will be faxed by our nurses. Please follow up with our clinic regularly to monitor your response. Your response to treatment will demonstrate the need for ongoing work accommodations while we treat your symptoms.      Your emotional health is important to us!  If you feel that you may hurt yourself or others, please seek help through the hospital ER, or 9-1-1.  You may also call [place] Crisis Connection, toll-free, at 1.677.272.5531 for immediate support.      The National Suicide Prevention Lifeline at  384 or 1-194.676.3372 can be reached 24/7.     [employer] Lifeline can be reached at 477-072-9598.   For LGBT youth (ages 24 and younger) contemplating suicide, the [employer] Lifeline can be reached at 1-594.730.1976.  [employer] Program: Text \"start\" to 396 332      Please call or return to clinic if your symptoms worsen.    Follow up plan  Return in about 2 - 3  weeks (around 2/2/2024), or if symptoms worsen or fail to improve, for Follow up.    Thank you for coming to [facility] today.  Lab Testing:  **If you had lab testing today and your results are reassuring or normal they will be mailed to you or sent through [email or web address] within 7 days.   **If the lab tests need quick action we will call you with the results.  **If you are having labs done on a different day, please call [phone] to schedule at [facility] or [phone] for other [facility]patient Lab locations. Labs do not offer walk-in appointments.  The phone number we will call with results is # [phone] (home) . If this is not the best number please call our clinic and change the number.  Medication Refills:  If you need any refills please call your pharmacy and they will contact us.   If you need to  your refill at a new pharmacy, please contact the new pharmacy directly. The " new pharmacy will help you get your medications transferred faster.   Scheduling:  If you have any concerns about today's visit or wish to schedule another appointment please call our office during normal business hours [phone] (8-5:00 M-F). If you can no longer make a scheduled visit, please cancel via [place] or call us to cancel.   If a referral was made to an [facility] specialty provider and you do not get a call from central scheduling, please refer to directions on your visit summary or call our office during normal business hours for assistance.   If a Mammogram was ordered for you at the Breast Center call [phone] to schedule or change your appointment.  If you had an XRay/CT/Ultrasound/MRI ordered the number is [phone] to schedule or change your radiology appointment.   [facility] has limited ultrasound appointments available on Wednesdays, if you would like your ultrasound at [facility], please call [phone] to schedule.   Medical Concerns:  If you have urgent medical concerns please call [phone] at any time of the day.    John Paul Graff MD

## 2024-01-29 ENCOUNTER — VIRTUAL VISIT (OUTPATIENT)
Dept: PSYCHOLOGY | Facility: CLINIC | Age: 44
End: 2024-01-29
Attending: STUDENT IN AN ORGANIZED HEALTH CARE EDUCATION/TRAINING PROGRAM
Payer: COMMERCIAL

## 2024-01-29 DIAGNOSIS — Z03.89 NO DIAGNOSIS ON AXIS I: Primary | ICD-10-CM

## 2024-01-29 ASSESSMENT — PATIENT HEALTH QUESTIONNAIRE - PHQ9
SUM OF ALL RESPONSES TO PHQ QUESTIONS 1-9: 16
SUM OF ALL RESPONSES TO PHQ QUESTIONS 1-9: 16
10. IF YOU CHECKED OFF ANY PROBLEMS, HOW DIFFICULT HAVE THESE PROBLEMS MADE IT FOR YOU TO DO YOUR WORK, TAKE CARE OF THINGS AT HOME, OR GET ALONG WITH OTHER PEOPLE: VERY DIFFICULT

## 2024-01-29 ASSESSMENT — ANXIETY QUESTIONNAIRES
GAD7 TOTAL SCORE: 16
GAD7 TOTAL SCORE: 16
6. BECOMING EASILY ANNOYED OR IRRITABLE: MORE THAN HALF THE DAYS
5. BEING SO RESTLESS THAT IT IS HARD TO SIT STILL: SEVERAL DAYS
8. IF YOU CHECKED OFF ANY PROBLEMS, HOW DIFFICULT HAVE THESE MADE IT FOR YOU TO DO YOUR WORK, TAKE CARE OF THINGS AT HOME, OR GET ALONG WITH OTHER PEOPLE?: VERY DIFFICULT
7. FEELING AFRAID AS IF SOMETHING AWFUL MIGHT HAPPEN: NEARLY EVERY DAY
4. TROUBLE RELAXING: MORE THAN HALF THE DAYS
2. NOT BEING ABLE TO STOP OR CONTROL WORRYING: NEARLY EVERY DAY
1. FEELING NERVOUS, ANXIOUS, OR ON EDGE: NEARLY EVERY DAY
IF YOU CHECKED OFF ANY PROBLEMS ON THIS QUESTIONNAIRE, HOW DIFFICULT HAVE THESE PROBLEMS MADE IT FOR YOU TO DO YOUR WORK, TAKE CARE OF THINGS AT HOME, OR GET ALONG WITH OTHER PEOPLE: VERY DIFFICULT
7. FEELING AFRAID AS IF SOMETHING AWFUL MIGHT HAPPEN: NEARLY EVERY DAY
GAD7 TOTAL SCORE: 16
3. WORRYING TOO MUCH ABOUT DIFFERENT THINGS: MORE THAN HALF THE DAYS

## 2024-01-29 NOTE — PROGRESS NOTES
Patient struggling to get connected and finish forms this morning.  They didn't have time to figure out technology until right at our appointment time due to finishing up questionnaires during that time (and they are now all completed).  The patient was scheduled with me for a NP appointment, however, since we were unable to figure out our technology issues by 24 mins into our appointment time/patient had to turn their technology on and off, it made the most sense to have the patient scheduled with a different provider since I likely wouldn't be able to finish the DA in the time we had left today, and since I wouldn't be able to see her for a return visit to finish the process since my leave starts Wednesday 1/31.  Patient is hoping to be seen by any provider with the first available appointment time and would like to be on waitlists if possible.  She prefers a phone visit - or someone close to the area she lives in so she could eventually be seen in person.  Followed up with intake to hep assist with this process.

## 2024-01-30 ENCOUNTER — VIRTUAL VISIT (OUTPATIENT)
Dept: PSYCHOLOGY | Facility: CLINIC | Age: 44
End: 2024-01-30
Payer: COMMERCIAL

## 2024-01-30 DIAGNOSIS — F33.1 MODERATE EPISODE OF RECURRENT MAJOR DEPRESSIVE DISORDER (H): Chronic | ICD-10-CM

## 2024-01-30 DIAGNOSIS — F41.9 ANXIETY: Primary | Chronic | ICD-10-CM

## 2024-01-30 PROCEDURE — 90791 PSYCH DIAGNOSTIC EVALUATION: CPT | Mod: 93 | Performed by: PSYCHOLOGIST

## 2024-01-30 ASSESSMENT — COLUMBIA-SUICIDE SEVERITY RATING SCALE - C-SSRS
6. HAVE YOU EVER DONE ANYTHING, STARTED TO DO ANYTHING, OR PREPARED TO DO ANYTHING TO END YOUR LIFE?: NO
1. IN THE PAST MONTH, HAVE YOU WISHED YOU WERE DEAD OR WISHED YOU COULD GO TO SLEEP AND NOT WAKE UP?: NO
TOTAL  NUMBER OF INTERRUPTED ATTEMPTS LIFETIME: NO
1. HAVE YOU WISHED YOU WERE DEAD OR WISHED YOU COULD GO TO SLEEP AND NOT WAKE UP?: YES
LETHALITY/MEDICAL DAMAGE CODE MOST LETHAL ACTUAL ATTEMPT: NO PHYSICAL DAMAGE OR VERY MINOR PHYSICAL DAMAGE
ATTEMPT PAST THREE MONTHS: NO
2. HAVE YOU ACTUALLY HAD ANY THOUGHTS OF KILLING YOURSELF?: NO
ATTEMPT LIFETIME: YES
REASONS FOR IDEATION LIFETIME: MOSTLY TO END OR STOP THE PAIN (YOU COULDN'T GO ON LIVING WITH THE PAIN OR HOW YOU WERE FEELING)
REASONS FOR IDEATION PAST MONTH: DOES NOT APPLY
LETHALITY/MEDICAL DAMAGE CODE MOST RECENT ACTUAL ATTEMPT: NO PHYSICAL DAMAGE OR VERY MINOR PHYSICAL DAMAGE
TOTAL  NUMBER OF ACTUAL ATTEMPTS LIFETIME: 1
TOTAL  NUMBER OF ABORTED OR SELF INTERRUPTED ATTEMPTS LIFETIME: NO

## 2024-01-30 NOTE — PROGRESS NOTES
"    [facility]      PATIENT'S NAME: Lesly Herrera  PREFERRED NAME: Lesly  PRONOUNS:    she/her  MRN: [id number]  : 1980  ADDRESS: [address]  [facility] [place] [id number]  ACCT. NUMBER:  [id number]  DATE OF SERVICE: 24  START TIME: 11:34 am  END TIME: 12:30 am  PREFERRED PHONE: [phone]  May we leave a program related message: Yes  EMERGENCY CONTACT: was obtained  .  SERVICE MODALITY:  Phone Visit:      Provider verified identity through the following two step process.  Patient provided:  Patient  and Patient address    Telephone Visit: The patient's condition can be safely assessed and treated via synchronous audio telemedicine encounter.      Reason for Audio Telemedicine Visit: Services only offered telehealth    Originating Site (Patient Location): Patient's home    Distant Site (Provider Location): Provider Remote Setting- Home Office    Consent:  The patient/guardian has verbally consented to:     1. The potential risks and benefits of telemedicine (telephone visit) versus in person care;    The patient has been notified of the following:      \"We have found that certain health care needs can be provided without the need for a face to face visit.  This service lets us provide the care you need with a phone conversation.       I will have full access to your [facility] medical record during this entire phone call.   I will be taking notes for your medical record.      Since this is like an office visit, we will bill your insurance company for this service.       There are potential benefits and risks of telephone visits (e.g. limits to patient confidentiality) that differ from in-person visits.?Confidentiality still applies for telephone services, and nobody will record the visit.  It is important to be in a quiet, private space that is free of distractions (including cell phone or other devices) during the visit.??      If during the course of the call I " "believe a telephone visit is not appropriate, you will not be charged for this service\"     Consent has been obtained for this service by care team member: Yes     UNIVERSAL ADULT Mental Health DIAGNOSTIC ASSESSMENT    Identifying Information:  Patient is a 43 year old,  ;  individual.  Patient was referred for an assessment by [facility].  Patient attended the session alone.    Chief Complaint:   The reason for seeking services at this time is: \"Anxiety, Depression, panic attacks, stress\".  The problem(s) began 12/08/22.  Deaths in the family  mother passed :12-08 22   Uncle in December 2023  Patient has attempted to resolve these concerns in the past through talking to support system .    Social/Family History:  Patient reported they grew up in other [place].  They were raised by biological parents  .  Parents  / .  Patient reported that their childhood was difficult.  Patient described their current relationships with family of origin as grief      Longest partner ship  11 years.    Recent medical visit for a panic attack> having more due to stress.  Sometimes breathing helps, sometimes not.  Work stress- unrealitic quotas. Afraid she will lose her job.    Double check things  Has to do things twice- stove car, setting car alarm, runs back to touch   \"I am kind of a germ aphobe\"    Alcohol mother and family member on mother's side.  Sister is schizophrenic    Moved to the [place] age 14. Grew up [place].  At age 15 mother abandoned her .Had made amends with mother before her death..  Deaths in the family  Deaths in the family  mother passed :12-08 22   Uncle in December 2023.     The patient describes their cultural background as ; .  Cultural influences and impact on patient's life structure, values, norms, and healthcare: Experienced discrimination.  Contextual influences on patient's health include: multiple " losses.    These factors will be addressed in the Preliminary Treatment plan. Patient identified their preferred language to be English. Patient reported they need the assistance of an  or other support involved in therapy.     Patient reported had no significant delays in developmental tasks.   Patient's highest education level was associate degree / vocational certificate  . In high school was in accelerated classes. Patient identified the following learning problems: none reported.  Modifications will not be used to assist communication in therapy.  Patient reports they are  able to understand written materials.    Patient reported the following relationship history:  Patient's current relationship status is has a partner or significant other for 2 years- 3 months more exclusive.  Had her children when she was youn Patient identified their sexual orientation as heterosexual.  Patient reported having 2 child(bryan). Patient identified no one as part of their support system.  Patient identified the quality of these relationships as fair,  .      Patient's current living/housing situation involves staying in own home/apartment.  The immediate members of family and household include .and they report that housing is stable.    Patient is currently employed fulltime.  Patient reports their finances are obtained through employment. Patient does identify finances as a current stressor.      Patient reported that they have not been involved with the legal system.    Patient does not report being under probation/ parole/ jurisdiction. .    Patient's Strengths and Limitations:  Patient identified the following strengths or resources that will help them succeed in treatment: commitment to health and well being, insight, intelligence, motivation, sense of humor, and work ethic. Things that may interfere with the patient's success in treatment include: none identified.     Assessments:  The following  assessments were completed by patient for this visit:  PHQ9:       4/17/2018     8:14 AM 9/9/2019     5:12 PM 9/9/2020     8:22 AM 3/26/2023     2:54 PM 6/26/2023     2:07 PM 12/29/2023    11:16 AM 1/29/2024     9:53 AM   PHQ-9 SCORE   PHQ-9 Total Score MyChart       16 (Moderately severe depression)   PHQ-9 Total Score 3 12 1 8 7 8 16     GAD7:       5/9/2014     5:10 PM 11/25/2014     9:39 AM 9/22/2015     8:16 AM 9/9/2019     5:12 PM 9/9/2020     8:22 AM 3/26/2023     2:54 PM 1/29/2024     9:55 AM   RUBEN-7 SCORE   Total Score 16 1        Total Score       16 (severe anxiety)   Total Score   17 10 3 4 16     CAGE-AID:       1/29/2024     9:57 AM   CAGE-AID Total Score   Total Score 2   Total Score MyChart 2 (A total score of 2 or greater is considered clinically significant)     PROMIS 10-Global Health (all questions and answers displayed):       1/29/2024     9:57 AM   PROMIS 10   In general, would you say your health is: Very good   In general, would you say your quality of life is: Fair   In general, how would you rate your physical health? Fair   In general, how would you rate your mental health, including your mood and your ability to think? Poor   In general, how would you rate your satisfaction with your social activities and relationships? Fair   In general, please rate how well you carry out your usual social activities and roles Poor   To what extent are you able to carry out your everyday physical activities such as walking, climbing stairs, carrying groceries, or moving a chair? Moderately   In the past 7 days, how often have you been bothered by emotional problems such as feeling anxious, depressed, or irritable? Always   In the past 7 days, how would you rate your fatigue on average? Severe   In the past 7 days, how would you rate your pain on average, where 0 means no pain, and 10 means worst imaginable pain? 0   In general, would you say your health is: 4   In general, would you say your quality of  life is: 2   In general, how would you rate your physical health? 2   In general, how would you rate your mental health, including your mood and your ability to think? 1   In general, how would you rate your satisfaction with your social activities and relationships? 2   In general, please rate how well you carry out your usual social activities and roles. (This includes activities at home, at work and in your community, and responsibilities as a parent, child, spouse, employee, friend, etc.) 1   To what extent are you able to carry out your everyday physical activities such as walking, climbing stairs, carrying groceries, or moving a chair? 3   In the past 7 days, how often have you been bothered by emotional problems such as feeling anxious, depressed, or irritable? 5   In the past 7 days, how would you rate your fatigue on average? 4   In the past 7 days, how would you rate your pain on average, where 0 means no pain, and 10 means worst imaginable pain? 0   Global Mental Health Score 6   Global Physical Health Score 12   PROMIS TOTAL - SUBSCORES 18       Personal and Family Medical History:  Patient does report a family history of mental health concerns.  Patient reports family history includes Alcohol/Drug in her mother; Breast Cancer in her paternal aunt; Diabetes in her mother; Hypertension in her mother; Psychotic Disorder in her sister..     Patient does not report Mental Health Diagnosis or Treatment.      Patient has had a physical exam to rule out medical causes for current symptoms.  Date of last physical exam was within the past year. Client was encouraged to follow up with PCP if symptoms were to develop. The patient has a [facility] Primary [facility] Provider, who is named Cinthia Garza..  Patient reports  see emr .  Patient denies any issues with pain..   There are not significant appetite / nutritional concerns / weight changes.   Patient does not report a history of head injury / trauma / cognitive  impairment.      Patient reports current meds as:   No outpatient medications have been marked as taking for the 24 encounter (Virtual Visit) with Erica Gonzalez LP.       Medication Adherence:  Patient reports taking.  taking prescribed medications as prescribed.    Patient Allergies:  No Known Allergies    Medical History:    Past Medical History:   Diagnosis Date    Anemia     Fibroid     History of robot-assisted laparoscopic hysterectomy 10/10/2023         Current Mental Status Exam:   Appearance:  Not observed    Eye Contact:  Not observed    Psychomotor:  Not observed        Gait / station:  Not observed   Attitude / Demeanor: Cooperative   Speech      Rate / Production: Normal/ Responsive      Volume:  Normal  volume      Language:  intact  Mood:   Normal  Affect:   Appropriate    Thought Content: Rumination   Thought Process: Coherent  Obsessive - specific to checking      Associations: No loosening of associations  Insight:   Good   Judgment:  Intact   Orientation:  All  Attention/concentration: Fair    Substance Use:   Patient did report a family history of substance use concerns; see medical history section for details.  Patient has not received chemical dependency treatment in the past.  Patient has not ever been to detox.      Patient is not currently receiving any chemical dependency treatment. Patient reported the following problems as a result of their substance use:   none .    Patient reports using alcohol 6 times per week and has 3 beers at a time. Patient first started drinking at age 15.  Patient reported date of last use was last week.  Patient reports heaviest use was hen mom .  Patient reports using tobacco  1-3 times per day. Client started using tobacco at age [age].    Patient reports using cannabis 2 times per year and smokes 1 at a time. Patient started using cannabis at age 14.  Patient reports last use was unknown.  Patient reports heaviest use is current use.  Patient reports  using caffeine 4 times per day and drinks 1 at a time. Patient started using caffeine at age unknown.  Patient reports using/abusing the following substance(s). Patient reported no other substance use.     Substance Use: No symptoms    Based on the negative CAGE score and clinical interview there  are not indications of drug or alcohol abuse.    Significant Losses / Trauma / Abuse / Neglect Issues:   Patient did not  serve in the .  There are indications or report of significant loss, trauma, abuse or neglect issues related to: are indications or report of significant loss, trauma, abuse or neglect issues related to.  Concerns for possible neglect - mother abandoned her at age 15     Safety Assessment:   Patient denies current homicidal ideation and behaviors.Patient denies current self-injurious ideation and behaviors.    Patient denied risk behaviors associated with substance use.   Patient denies any high risk behaviors associated with mental health symptoms.  Patient reports the following current concerns for their personal safety: None.  Patient reports there are not firearms in the house.       .    History of Safety Concerns:  Patient denied a history of homicidal ideation.     Patient denied a history of personal safety concerns.    Patient denied a history of assaultive behaviors.    Patient denied a history of sexual assault behaviors.     Patient denied a history of risk behaviors associated with substance use.  Patient denies any history of high risk behaviors associated with mental health symptoms.  Patient reports the following protective factors: forward or future oriented thinking; dedication to family or friends; help seeking behaviors when distressed; healthy fear of risky behaviors or pain    Risk Plan:  See Recommendations for Safety and Risk Management Plan    Review of Symptoms per patient report:         Depression: Change in sleep, Change in energy level, Change in appetite,  "Psychomotor slowing or agitation, and Ruminations  Martina:  No Symptoms  Psychosis: No Symptoms  Anxiety: Excessive worry, Nervousness, Psychomotor agitation, Ruminations, and Poor concentration  Panic:  Palpitations, Shortness of breath, Tingling, and shortness of breath  Post Traumatic Stress Disorder:  Experienced traumatic event   and Reexperiencing of trauma   Eating Disorder: No Symptoms  ADD / ADHD:  Inattentive, Difficulties listening, Poor task completion, Poor organizational skills, Forgetful, and memory problems  Conduct Disorder: No symptoms  Autism Spectrum Disorder: No symptoms  Obsessive Compulsive Disorder: Checking and Cleaning Double check things  Has to do things twice- stove car, setting car alarm, runs back to touch   \"I am kind of a germ aphobe\"    Patient reports the following compulsive behaviors and treatment history:  none .      Diagnostic Criteria:     Generalized Anxiety Disorder   - Restlessness or feeling keyed up or on edge.    - Being easily fatigued.    - Difficulty concentrating or mind going blank.    - Muscle tension.    - Sleep disturbance (difficulty falling or staying asleep, or restless unsatisfying sleep).  Major Depressive Disorder   - Depressed mood. Note: In children and adolescents, can be irritable mood.     - Diminished interest or pleasure in all, or almost all, activities.    - Psychomotor activity retardation.    - Fatigue or loss of energy.    - Diminished ability to think or concentrate, or indecisiveness.     Functional Status:  Patient reports the following functional impairments:   focus and energy .     Nonprogrammatic care:  Patient is requesting basic services to address current mental health concerns.    Clinical Summary:  1. Psychosocial, Cultural and Contextual Factors: \"Anxiety, Depression, panic attacks, stress\".   .  2. Principal DSM5 Diagnoses  (Sustained by DSM5 Criteria Listed Above):   Generalized Anxiety Disorder.  3. Other Diagnoses that is " relevant to services:   Generalized Anxiety Disorder  4. Provisional Diagnosis:  Generalized Anxiety Disorder as evidenced by todays intake .  5. Prognosis: Return to Baseline Functioning and Relieve Acute Symptoms.  6. Likely consequences of symptoms if not treated: worsening condition.  7. Client strengths include:  goal-focused, insightful, intelligent, motivated, and support of family, friends and providers .     Recommendations:     1. Plan for Safety and Risk Management:   Safety and Risk: Recommended that patient call 911 or go to the local ED should there be a change in any of these risk factors..          Report to child / adult protection services was NA.     2. Patient's identified mental health concerns with a cultural influence will be addressed by tx planning .     3. Initial Treatment will focus on:    Anxiety -    Grief / Loss - death of mother .     4. Resources/Service Plan:    services are not indicated.   Modifications to assist communication are not indicated.   Additional disability accommodations are not indicated.      5. Collaboration:   Collaboration / coordination of treatment will be initiated with the following  support professionals: primary care physician.      6.  Referrals:   The following referral(s) will be initiated:  none .       A Release of Information has been obtained for the following:  none .     Clinical Substantiation/medical necessity for the above recommendations:  this visit.    7. JOY:    JOY:  Client is aware of the general effects of drugs and alcohol on health and well-being..  Recommendations:  none .     8. Records:   These were not available for review at time of assessment.   Information in this assessment was obtained from the medical record and  provided by patient who is a good historian.    Patient will have open access to their mental health medical record.    9.   Interactive Complexity: no    10. Safety Plan:   no    Provider Name/  Credentials:  Erica Gonzalez MS/LP  January 30, 2024

## 2024-02-05 ENCOUNTER — TELEPHONE (OUTPATIENT)
Dept: PSYCHOLOGY | Facility: CLINIC | Age: 44
End: 2024-02-05

## 2024-02-05 NOTE — TELEPHONE ENCOUNTER
Reason for call:  Other   Patient called regarding (reason for call): FMLA paperwork  Additional comments: pt is requesting you fill out her FMLA paperwork which needs to be in by 2/9/24. Pt will drop off paperwork today at the clinic for you. Please contact pt with questions. Pt requesting it be filed out asap please.    Phone number to reach patient:  Cell number on file:    Telephone Information:   Mobile [phone]       Best Time:  asap    Can we leave a detailed message on this number?  YES    Travel screening: Not Applicable

## 2024-02-13 ENCOUNTER — VIRTUAL VISIT (OUTPATIENT)
Dept: PSYCHOLOGY | Facility: CLINIC | Age: 44
End: 2024-02-13
Payer: COMMERCIAL

## 2024-02-13 DIAGNOSIS — F41.9 ANXIETY: ICD-10-CM

## 2024-02-13 DIAGNOSIS — F33.1 MODERATE EPISODE OF RECURRENT MAJOR DEPRESSIVE DISORDER (H): Primary | ICD-10-CM

## 2024-02-13 PROCEDURE — 90834 PSYTX W PT 45 MINUTES: CPT | Mod: 95 | Performed by: PSYCHOLOGIST

## 2024-02-13 NOTE — PROGRESS NOTES
[facility]                                     Progress Note    Patient Name   Date: 2024         Service Type: Individual      Session Start Time  3:30 pm   Session End Time: 4:20 pm        Session Length: 38 - 52 min    Session #:  2 in   Attendees: Client    Service Modality:  Video Visit:      Provider verified identity through the following two step process.  Patient provided:  Patient  and Patient address    Telemedicine Visit: The patient's condition can be safely assessed and treated via synchronous audio and visual telemedicine encounter.      Reason for Telemedicine Visit: Services only offered telehealth    Originating Site (Patient Location): Patient's home    Distant Site (Provider Location): Provider Remote Setting- Home Office    Consent:  The patient/guardian has verbally consented to: the potential risks and benefits of telemedicine (video visit) versus in person care; bill my insurance or make self-payment for services provided; and responsibility for payment of non-covered services.     Patient would like the video invitation sent by:  Text to cell phone: yes    Mode of Communication:  Video Conference via   As the provider I attest to compliance with applicable laws and regulations related to telemedicine.    DATA  Interactive Complexity: No  Crisis: No     PROMIS completed at intake 24  Treatment plan reviewed : today  (90 days = 5-24)    Diagnosis:    (F41.1) Generalized Anxiety Disorder,  Major Depressicve Disorder, recurrent  moderate        Progress Since Last Session (Related to Symptoms / Goals / Homework):   Symptoms: stable    Homework: Completed in session      Episode of Care Goals: Satisfactory progress - ACTION (Actively working towards change); Intervened by reinforcing change plan / affirming steps taken.       Current / Ongoing Stressors and Concerns:    Anxiety, loss     Treatment Objective(s) Addressed in This Session:   identify core fears  / thoughts that contribute to feeling anxious  Patient will use at least 6 coping skills for anxiety management in the next 90 days.  Learn about physiological pathways related to stress and trauma.     Intervention:   Interpersonal Therapy: , CBT    Client shared current stressors;  Is considering leaving her current 2 year relationship    with boyfriend. ( Not living together - so doesn't have to move)  Was very uncomfortable in a recent situation where he asked that   she meet with the person she did not trust.  Wonders if she is trauma bonded to him.    Explored transition planning.      Identified and processed emotions.    Assessments completed prior to visit:  The following assessments were completed by patient for this visit: none      ASSESSMENT: Current Emotional / Mental Status (status of significant symptoms):   Risk status (Self / Other harm or suicidal ideation)   Patient denies current fears or concerns for personal safety.   Patient denies current or recent suicidal ideation or behaviors.   Patient denies current or recent homicidal ideation or behaviors.   Patient denies current or recent self injurious behavior or ideation.   Patient denies other safety concerns.   Patient reports there has been no change in risk factors since their last session.     Patient reports there has been no change in protective factors since their last session.     Recommended that patient call 911 or go to the local ED should there be a change in any of these risk factors.     Appearance:   Appropriate    Eye Contact:   Good    Psychomotor Behavior: Normal    Attitude:   Cooperative    Orientation:   All   Speech   Rate / Production: Normal    Volume:  Normal    Mood:    Normal   Affect:    Appropriate  Worrisome    Thought Content:  Clear    Thought Form:  Coherent  Logical    Insight:    Good      Medication Review:   No changes to psychiatric medications, see updated Medication List in EPIC.          Medication  Compliance:   NA     Changes in Health Issues:   None reported     Chemical Use Review:   Substance Use: Chemical use reviewed, no active concerns identified      Tobacco Use: No current tobacco use.      Diagnosis:   F41.1 Generalized Anxiety Disorder,     Collateral Reports Completed:   Routed note to PCP as needed    PLAN: (Patient Tasks / Therapist Tasks / Other  daily coping skills  and planning for transition      Erica Gonzalez LP                                                         ____________________________________________________________________    Individual Treatment Plan    Patient's Name:  Date Of Birth:     Date of Creation: 1- 04-24  Date Treatment Plan Last Reviewed/Revised: 12-23    DSM5 Diagnoses:   300.02 (F41.1) Generalized Anxiety Disorder,       Psychosocial / Contextual Factors: multiple stressors, mood disruption  PROMIS (reviewed every 90 days): at intake 1-30 24    Referral / Collaboration:  Referral to another professional/service is not indicated at this time.    Anticipated number of session for this episode of care: 6.  Anticipation frequency of session: Every other week  Anticipated Duration of each session: 38-52 minutes and/or 53-60 minutes  Treatment plan will be reviewed in 90 days or when goals have been changed.       Measurable Treatment Goal(s) related to diagnosis / functional impairment(s)  Goal 1: Patient will identify core pattern of  thoughts that contribute to feeling anxious.    I will know I've met my goal when .  will report 50% less disrupted mood.      Objective #A (Patient Action)    Patient will use at least 6 coping skills for anxiety management in the next 90 days weeks.  Status: New - Date: 2024    Intervention(s)  Therapist will teach coping skills for anxiety/ unwanted changes.      Patient has reviewed and agreed to the above plan.      Erica Gonzalez LP

## 2024-02-27 ENCOUNTER — PATIENT OUTREACH (OUTPATIENT)
Dept: CARE COORDINATION | Facility: CLINIC | Age: 44
End: 2024-02-27
Payer: COMMERCIAL

## 2024-02-27 NOTE — PROGRESS NOTES
Clinic Care Coordination Contact  Program:   County:   Renewal: MEDICA  Date Applied:      ÓSCAR Outreach:   2/27/24: 1st outreach attempt. Left a message on voicemail with call back information and requested return call.  Plan: CTA will call again within 2 weeks.  Radha Prasad  Care   [facility]  Clinic Care Coordination  [phone]       Health Insurance:        Referral/Screening:

## 2024-02-28 DIAGNOSIS — F32.A DEPRESSION, UNSPECIFIED DEPRESSION TYPE: ICD-10-CM

## 2024-02-28 DIAGNOSIS — F41.0 PANIC ATTACK: ICD-10-CM

## 2024-02-28 NOTE — TELEPHONE ENCOUNTER

## 2024-03-04 RX ORDER — HYDROXYZINE HYDROCHLORIDE 25 MG/1
TABLET, FILM COATED ORAL
Qty: 180 TABLET | Refills: 3 | Status: SHIPPED | OUTPATIENT
Start: 2024-03-04

## 2024-03-12 ENCOUNTER — VIRTUAL VISIT (OUTPATIENT)
Dept: PSYCHOLOGY | Facility: CLINIC | Age: 44
End: 2024-03-12
Payer: COMMERCIAL

## 2024-03-12 DIAGNOSIS — F33.1 MODERATE EPISODE OF RECURRENT MAJOR DEPRESSIVE DISORDER (H): Primary | ICD-10-CM

## 2024-03-12 DIAGNOSIS — F41.9 ANXIETY: ICD-10-CM

## 2024-03-12 PROCEDURE — 90837 PSYTX W PT 60 MINUTES: CPT | Mod: 93 | Performed by: PSYCHOLOGIST

## 2024-03-12 NOTE — PROGRESS NOTES
[facility]                                     Progress Note    Patient Name   Date: 3-         Service Type: Individual      Session Start Time  3:30 pm   Session End Time: 4:20 pm        Session Length: 38 - 52 min    Session #:  3 in   Attendees: Client    Service Modality:  Video Visit:      Provider verified identity through the following two step process.  Patient provided:  Patient  and Patient address    Telemedicine Visit: The patient's condition can be safely assessed and treated via synchronous audio and visual telemedicine encounter.      Reason for Telemedicine Visit: Services only offered telehealth    Originating Site (Patient Location): Patient's home    Distant Site (Provider Location): Provider Remote Setting- Home Office    Consent:  The patient/guardian has verbally consented to: the potential risks and benefits of telemedicine (video visit) versus in person care; bill my insurance or make self-payment for services provided; and responsibility for payment of non-covered services.     Patient would like the video invitation sent by:  Text to cell phone: yes    Mode of Communication:  Video Conference via   As the provider I attest to compliance with applicable laws and regulations related to telemedicine.    DATA  Interactive Complexity: No  Crisis: No     PROMIS completed at intake 24  Treatment plan reviewed : today  (90 days = 5-24)    Diagnosis:    (F41.1) Generalized Anxiety Disorder,  Major Depressicve Disorder, recurrent  moderate        Progress Since Last Session (Related to Symptoms / Goals / Homework):   Symptoms: stable    Homework: Completed in session      Episode of Care Goals: Satisfactory progress - ACTION (Actively working towards change); Intervened by reinforcing change plan / affirming steps taken.       Current / Ongoing Stressors and Concerns:    Anxiety, loss     Treatment Objective(s) Addressed in This Session:   identify core fears  "/ thoughts that contribute to feeling anxious  Patient will use at least 6 coping skills for anxiety management in the next 90 days.  Learn about physiological pathways related to stress and trauma.     Intervention:   Interpersonal Therapy: , CBT    Client shared current stressors;    Grief around loss of relationship and   Grief - the loss mother.      \"I get a thought that she is not here, and almost get an anxiety attack\".  Reporting intermittent  crying spells.    Strategy  if it happens at work-  takes her break / or goes tot he car.    Remembers the times that she was a great mom.  Also explored her mother's loss of health; creating a strained relationship.  (sister is schizophrenic).    Reviewed care giver role formation and identity.  Client has insights around the type of relationships    That she has had with with boyfriends.     Identified and processed emotions.    Assessments completed prior to visit:  The following assessments were completed by patient for this visit: none      ASSESSMENT: Current Emotional / Mental Status (status of significant symptoms):   Risk status (Self / Other harm or suicidal ideation)   Patient denies current fears or concerns for personal safety.   Patient denies current or recent suicidal ideation or behaviors.   Patient denies current or recent homicidal ideation or behaviors.   Patient denies current or recent self injurious behavior or ideation.   Patient denies other safety concerns.   Patient reports there has been no change in risk factors since their last session.     Patient reports there has been no change in protective factors since their last session.     Recommended that patient call 911 or go to the local ED should there be a change in any of these risk factors.     Appearance:   Appropriate    Eye Contact:   Good    Psychomotor Behavior: Normal    Attitude:   Cooperative    Orientation:   All   Speech   Rate / Production: Normal    Volume:  Normal "    Mood:    Normal   Affect:    Appropriate  Worrisome    Thought Content:  Clear    Thought Form:  Coherent  Logical    Insight:    Good      Medication Review:   No changes to psychiatric medications, see updated Medication List in EPIC.          Medication Compliance:   NA     Changes in Health Issues:   None reported     Chemical Use Review:   Substance Use: Chemical use reviewed, no active concerns identified      Tobacco Use: No current tobacco use.      Diagnosis:   F41.1 Generalized Anxiety Disorder,     Collateral Reports Completed:   Routed note to PCP as needed    PLAN: (Patient Tasks / Therapist Tasks / Other    Enact check ins with self-    Consider esteemed acts and self preservation / self care moments    daily coping skills  and planning for transition      Erica Gonzalez CLARI                                                         ____________________________________________________________________    Individual Treatment Plan    Patient's Name:  Date Of Birth:     Date of Creation: [birth year]  Date Treatment Plan Last Reviewed/Revised: 12-23    DSM5 Diagnoses:   300.02 (F41.1) Generalized Anxiety Disorder,       Psychosocial / Contextual Factors: multiple stressors, mood disruption  PROMIS (reviewed every 90 days): at intake 1-30 24    Referral / Collaboration:  Referral to another professional/service is not indicated at this time.    Anticipated number of session for this episode of care: 6.  Anticipation frequency of session: Every other week  Anticipated Duration of each session: 38-52 minutes and/or 53-60 minutes  Treatment plan will be reviewed in 90 days or when goals have been changed.       Measurable Treatment Goal(s) related to diagnosis / functional impairment(s)  Goal 1: Patient will identify core pattern of  thoughts that contribute to feeling anxious.    I will know I've met my goal when .  will report 50% less disrupted mood.      Objective #A (Patient Action)    Patient will use at  least 6 coping skills for anxiety management in the next 90 days weeks.  Status: New - Date: 2024    Intervention(s)  Therapist will teach coping skills for anxiety/ unwanted changes.      Patient has reviewed and agreed to the above plan.      Erica Gonzalez LP

## 2024-03-23 ENCOUNTER — HEALTH MAINTENANCE LETTER (OUTPATIENT)
Age: 44
End: 2024-03-23

## 2024-04-01 ENCOUNTER — PATIENT OUTREACH (OUTPATIENT)
Dept: CARE COORDINATION | Facility: CLINIC | Age: 44
End: 2024-04-01
Payer: COMMERCIAL

## 2024-04-01 NOTE — PROGRESS NOTES
Clinic Care Coordination Contact  Program:   [facility]: [place]   Renewal: MEDICA  Date Applied:      ÓSCAR Outreach:   4/1/24: 2nd outreach attempt. Left message on voicemail indicating last outreach attempt. CTA left [facility] number for renewal follow up.  Plan: CTA will no longer make outreach  Radha Grullon   [facility]  Clinic Care Coordination  [phone]    2/27/24: 1st outreach attempt. Left a message on voicemail with call back information and requested return call.  Plan: CTA will call again within 2 weeks.  Radha Grullon   [facility]  Clinic Care Coordination  [phone]       Health Insurance:        Referral/Screening:

## 2024-04-08 ENCOUNTER — VIRTUAL VISIT (OUTPATIENT)
Dept: PSYCHOLOGY | Facility: CLINIC | Age: 44
End: 2024-04-08
Payer: COMMERCIAL

## 2024-04-08 DIAGNOSIS — F41.9 ANXIETY: ICD-10-CM

## 2024-04-08 DIAGNOSIS — F33.1 MODERATE EPISODE OF RECURRENT MAJOR DEPRESSIVE DISORDER (H): Primary | ICD-10-CM

## 2024-04-08 PROCEDURE — 90837 PSYTX W PT 60 MINUTES: CPT | Mod: 93 | Performed by: PSYCHOLOGIST

## 2024-04-08 NOTE — PROGRESS NOTES
[facility]                                     Progress Note    Patient Name   Date: 2024         Service Type: Individual      Session Start Time  3:32 pm   Session End Time: 4:30 pm        Session Length: 60 min    Session #:  4 in   Attendees: Client    Service Modality:  Video Visit:      Provider verified identity through the following two step process.  Patient provided:  Patient  and Patient address    Telemedicine Visit: The patient's condition can be safely assessed and treated via synchronous audio and visual telemedicine encounter.      Reason for Telemedicine Visit: Services only offered telehealth    Originating Site (Patient Location): Patient's home    Distant Site (Provider Location): Provider Remote Setting- Home Office    Consent:  The patient/guardian has verbally consented to: the potential risks and benefits of telemedicine (video visit) versus in person care; bill my insurance or make self-payment for services provided; and responsibility for payment of non-covered services.     Patient would like the video invitation sent by:  Text to cell phone: yes    Mode of Communication:  Video Conference via   As the provider I attest to compliance with applicable laws and regulations related to telemedicine.    DATA  Interactive Complexity: No  Crisis: No   Extended session:  yes  60 minutes  on  24     Patient's presenting concerns require more intensive intervention than could be completed within the usual service  - High distress and under complex circumstances.  See Data section for details    PROMIS completed at intake 24  Treatment plan reviewed : today  (90 days = -24)    Diagnosis:    (F41.1) Generalized Anxiety Disorder,    F33.1 Major Depressive Disorder, recurrent  moderate        Progress Since Last Session (Related to Symptoms / Goals / Homework):   Symptoms: stable. Tearful throughout session    Homework: Completed in session      Episode of  "Care Goals: Satisfactory progress - ACTION (Actively working towards change); Intervened by reinforcing change plan / affirming steps taken.       Current / Ongoing Stressors and Concerns:    Anxiety, loss   Grief around loss of relationship and   Grief - the loss mother.     Treatment Objective(s) Addressed in This Session:   identify core fears / thoughts that contribute to feeling anxious  Patient will use at least 6 coping skills for anxiety management in the next 90 days.  Learn about physiological pathways related to stress and trauma.     Intervention:   Interpersonal Therapy: , CBT    Did end up getting FMLA with help of her pcp.      Focus of session:  Mood changes and relationships.    Has been missing her mother.  Is using breathing exercises or lay down.  Racing heart.  If I was there; she would've get better care.  Has loads of guilt .  Tearful throughout session  Death certificate stated- starvation/ dehydration.  Client was asked to identified her body/ \"the image pop in my head\".      Can't bring myself to open the box / her things.    Reviewed the day of the death.- felt out of body/ floaty.  Discussed shock. Could not get out of th car.    Introduced Emotional Freedom Tapping  (EFT )  Constructed stem statement.  \"Even though I    \"  Constructed \"unload\" statements.  \" This anxiety, this stress.\"  Taught 5 part circuit and breathing.  constructed resolve and commitment statements:  I resolve to   I commit to   Client did well with this. Is encouraged to do 3-5 rounds 2-3 times a day    Identified and processed emotions.    Assessments completed prior to visit:  The following assessments were completed by patient for this visit: none      ASSESSMENT: Current Emotional / Mental Status (status of significant symptoms):   Risk status (Self / Other harm or suicidal ideation)   Patient denies current fears or concerns for personal safety.   Patient denies current or recent suicidal ideation or " behaviors.   Patient denies current or recent homicidal ideation or behaviors.   Patient denies current or recent self injurious behavior or ideation.   Patient denies other safety concerns.   Patient reports there has been no change in risk factors since their last session.     Patient reports there has been no change in protective factors since their last session.     Recommended that patient call 911 or go to the local ED should there be a change in any of these risk factors.     Appearance:   Appropriate    Eye Contact:   Good    Psychomotor Behavior: Normal    Attitude:   Cooperative    Orientation:   All   Speech   Rate / Production: Normal    Volume:  Normal    Mood:    Normal   Affect:    Appropriate  Worrisome    Thought Content:  Clear    Thought Form:  Coherent  Logical    Insight:    Good      Medication Review:   No changes to psychiatric medications, see updated Medication List in EPIC.          Medication Compliance:   NA     Changes in Health Issues:   None reported     Chemical Use Review:   Substance Use: Chemical use reviewed, no active concerns identified      Tobacco Use: No current tobacco use.      Diagnosis:   F41.1 Generalized Anxiety Disorder,   F33.1 Major Depressive Disorder, recurrent  moderate    Collateral Reports Completed:   Routed note to PCP as needed    PLAN: (Patient Tasks / Therapist Tasks / Other  s encouraged to do 3-5 rounds 2-3 times a day    Past hw  Enact check ins with self-    Consider esteemed acts and self preservation / self care moments    daily coping skills  and planning for transition      Erica Gonzalez LP                                                         ____________________________________________________________________    Individual Treatment Plan    Patient's Name:  Date Of Birth:     Date of Creation: [birth year]  Date Treatment Plan Last Reviewed/Revised: 12-23    DSM5 Diagnoses:   300.02 (F41.1) Generalized Anxiety Disorder,   Grief      Psychosocial /  Contextual Factors: multiple stressors, mood disruption  PROMIS (reviewed every 90 days): at intake 1-30 24    Referral / Collaboration:  Referral to another professional/service is not indicated at this time.    Anticipated number of session for this episode of care: 6.  Anticipation frequency of session: Every other week  Anticipated Duration of each session: 38-52 minutes and/or 53-60 minutes  Treatment plan will be reviewed in 90 days or when goals have been changed.       Measurable Treatment Goal(s) related to diagnosis / functional impairment(s)  Goal 1: Patient will identify core pattern of  thoughts that contribute to feeling anxious.    I will know I've met my goal when .  will report 50% less disrupted mood.      Objective #A (Patient Action)    Patient will use at least 6 coping skills for anxiety management in the next 90 days weeks.  Status: New - Date: 2024    Intervention(s)  Therapist will teach coping skills for anxiety/ unwanted changes.      Patient has reviewed and agreed to the above plan.      Erica Gonzalez LP

## 2024-06-01 ENCOUNTER — HEALTH MAINTENANCE LETTER (OUTPATIENT)
Age: 44
End: 2024-06-01

## 2024-10-18 ENCOUNTER — HOSPITAL ENCOUNTER (OUTPATIENT)
Dept: MAMMOGRAPHY | Facility: CLINIC | Age: 44
Discharge: HOME | End: 2024-10-18
Attending: NURSE PRACTITIONER
Payer: COMMERCIAL

## 2024-10-18 DIAGNOSIS — N63.12 MASS OF UPPER INNER QUADRANT OF RIGHT BREAST: ICD-10-CM

## 2024-10-18 PROCEDURE — 77066 DX MAMMO INCL CAD BI: CPT

## 2024-10-18 PROCEDURE — 76642 ULTRASOUND BREAST LIMITED: CPT | Mod: RT

## (undated) DEVICE — SOL WATER IRRIG 3000ML BAG 2B7117

## (undated) DEVICE — PREP CHLORHEXIDINE 4% 4OZ (HIBICLENS) 57504

## (undated) DEVICE — GLOVE BIOGEL PI MICRO INDICATOR UNDERGLOVE SZ 6.0 48960

## (undated) DEVICE — KIT PATIENT POSITIONING PIGAZZI LATEX FREE 40580

## (undated) DEVICE — BARRIER INTERCEED 3X4" 4350

## (undated) DEVICE — NDL INSUFFLATION 13GA 120MM C2201

## (undated) DEVICE — DAVINCI XI TISSUE SEALER SYNCROSEAL 8MM 480440

## (undated) DEVICE — ENDO TROCAR CONMED AIRSEAL BLADELESS 05X120MM IAS5-120LP

## (undated) DEVICE — TUBING CONMED AIRSEAL SMOKE EVAC INSUFFLATION ASM-EVAC

## (undated) DEVICE — PREP DURAPREP 26ML APL 8630

## (undated) DEVICE — CATH TRAY FOLEY SURESTEP 16FR DRAIN BAG STATOCK A899916

## (undated) DEVICE — PREP LOTION REMOVER 0.5OZ 8610

## (undated) DEVICE — SU VICRYL 4-0 PS-2 18" UND J496H

## (undated) DEVICE — GLOVE BIOGEL PI MICRO INDICATOR UNDERGLOVE SZ 6.5 48965

## (undated) DEVICE — SUCTION IRR STRYKERFLOW II W/TIP 250-070-520

## (undated) DEVICE — Device

## (undated) DEVICE — DAVINCI XI OBTURATOR BLADELESS 8MM 470359

## (undated) DEVICE — ESU GROUND PAD ADULT W/CORD E7507

## (undated) DEVICE — SU WND CLOSURE VLOC 180 ABS 2-0 9" GS-22 VLOCL2145

## (undated) DEVICE — PAD CHUX UNDERPAD 30X36" P3036C

## (undated) DEVICE — DAVINCI XI DRAPE ARM 470015

## (undated) DEVICE — LINEN ORTHO ACL PACK 5447

## (undated) DEVICE — SUCTION MANIFOLD NEPTUNE 2 SYS 4 PORT 0702-020-000

## (undated) DEVICE — RETR ELEV / UTERINE MANIPULATOR V-CARE XLG CUP 60-6085-203A

## (undated) DEVICE — TUBING IRRIG TUR Y TYPE 96" LF 6543-01

## (undated) DEVICE — SOL NACL 0.9% INJ 1000ML BAG 2B1324X

## (undated) DEVICE — DAVINCI XI DRAPE COLUMN 470341

## (undated) DEVICE — PACK DAVINCI GYN SMA15GDFS1

## (undated) DEVICE — SOL WATER IRRIG 1000ML BOTTLE 2F7114

## (undated) DEVICE — PROTECTOR ARM ONE-STEP TRENDELENBURG 40418

## (undated) DEVICE — DAVINCI XI SEAL UNIVERSAL 5-8MM 470361

## (undated) DEVICE — DAVINCI HOT SHEARS TIP COVER  400180

## (undated) RX ORDER — BUPIVACAINE HYDROCHLORIDE 5 MG/ML
INJECTION, SOLUTION EPIDURAL; INTRACAUDAL
Status: DISPENSED
Start: 2023-10-10

## (undated) RX ORDER — ACETAMINOPHEN 325 MG/1
TABLET ORAL
Status: DISPENSED
Start: 2023-10-10

## (undated) RX ORDER — DEXAMETHASONE SODIUM PHOSPHATE 4 MG/ML
INJECTION, SOLUTION INTRA-ARTICULAR; INTRALESIONAL; INTRAMUSCULAR; INTRAVENOUS; SOFT TISSUE
Status: DISPENSED
Start: 2023-10-10

## (undated) RX ORDER — PHENAZOPYRIDINE HYDROCHLORIDE 200 MG/1
TABLET, FILM COATED ORAL
Status: DISPENSED
Start: 2023-10-10

## (undated) RX ORDER — OXYCODONE HYDROCHLORIDE 5 MG/1
TABLET ORAL
Status: DISPENSED
Start: 2023-10-10

## (undated) RX ORDER — CEFAZOLIN SODIUM/WATER 2 G/20 ML
SYRINGE (ML) INTRAVENOUS
Status: DISPENSED
Start: 2023-10-10

## (undated) RX ORDER — FENTANYL CITRATE 50 UG/ML
INJECTION, SOLUTION INTRAMUSCULAR; INTRAVENOUS
Status: DISPENSED
Start: 2023-10-10